# Patient Record
Sex: FEMALE | Race: WHITE | Employment: FULL TIME | URBAN - NONMETROPOLITAN AREA
[De-identification: names, ages, dates, MRNs, and addresses within clinical notes are randomized per-mention and may not be internally consistent; named-entity substitution may affect disease eponyms.]

---

## 2017-01-03 ENCOUNTER — ANESTHESIA EVENT (OUTPATIENT)
Dept: ENDOSCOPY | Age: 67
End: 2017-01-03
Payer: MEDICARE

## 2017-01-05 ENCOUNTER — ANESTHESIA (OUTPATIENT)
Dept: ENDOSCOPY | Age: 67
End: 2017-01-05
Payer: MEDICARE

## 2017-01-05 VITALS
OXYGEN SATURATION: 89 % | SYSTOLIC BLOOD PRESSURE: 114 MMHG | RESPIRATION RATE: 18 BRPM | DIASTOLIC BLOOD PRESSURE: 78 MMHG

## 2017-01-05 PROCEDURE — 2500000003 HC RX 250 WO HCPCS: Performed by: NURSE ANESTHETIST, CERTIFIED REGISTERED

## 2017-01-05 PROCEDURE — 6360000002 HC RX W HCPCS: Performed by: NURSE ANESTHETIST, CERTIFIED REGISTERED

## 2017-01-05 RX ORDER — PROPOFOL 10 MG/ML
INJECTION, EMULSION INTRAVENOUS PRN
Status: DISCONTINUED | OUTPATIENT
Start: 2017-01-05 | End: 2017-01-05 | Stop reason: SDUPTHER

## 2017-01-05 RX ORDER — LIDOCAINE HYDROCHLORIDE 10 MG/ML
INJECTION, SOLUTION EPIDURAL; INFILTRATION; INTRACAUDAL; PERINEURAL PRN
Status: DISCONTINUED | OUTPATIENT
Start: 2017-01-05 | End: 2017-01-05 | Stop reason: SDUPTHER

## 2017-01-05 RX ADMIN — PROPOFOL 250 MG: 10 INJECTION, EMULSION INTRAVENOUS at 08:37

## 2017-01-05 RX ADMIN — LIDOCAINE HYDROCHLORIDE 5 ML: 10 INJECTION, SOLUTION EPIDURAL; INFILTRATION; INTRACAUDAL; PERINEURAL at 08:37

## 2017-01-06 PROBLEM — R07.9 CHEST PAIN: Status: ACTIVE | Noted: 2017-01-06

## 2017-01-06 PROBLEM — H92.09 OTALGIA: Status: ACTIVE | Noted: 2017-01-06

## 2017-01-06 PROBLEM — R00.2 PALPITATIONS: Status: ACTIVE | Noted: 2017-01-06

## 2017-01-06 PROBLEM — E78.6 HYPOCHOLESTEREMIA: Status: ACTIVE | Noted: 2017-01-06

## 2017-01-06 PROBLEM — E03.9 HYPOTHYROIDISM: Status: ACTIVE | Noted: 2017-01-06

## 2017-01-06 PROBLEM — D50.9 MICROCYTIC ANEMIA: Status: ACTIVE | Noted: 2017-01-06

## 2017-01-10 ENCOUNTER — OFFICE VISIT (OUTPATIENT)
Dept: CARDIOLOGY | Facility: CLINIC | Age: 67
End: 2017-01-10

## 2017-01-10 VITALS
DIASTOLIC BLOOD PRESSURE: 90 MMHG | SYSTOLIC BLOOD PRESSURE: 142 MMHG | WEIGHT: 196 LBS | HEART RATE: 68 BPM | BODY MASS INDEX: 33.46 KG/M2 | HEIGHT: 64 IN

## 2017-01-10 DIAGNOSIS — E03.9 ACQUIRED HYPOTHYROIDISM: ICD-10-CM

## 2017-01-10 DIAGNOSIS — R00.2 PALPITATIONS: Primary | ICD-10-CM

## 2017-01-10 DIAGNOSIS — R07.89 ATYPICAL CHEST PAIN: ICD-10-CM

## 2017-01-10 PROCEDURE — 99204 OFFICE O/P NEW MOD 45 MIN: CPT | Performed by: INTERNAL MEDICINE

## 2017-01-10 PROCEDURE — 93000 ELECTROCARDIOGRAM COMPLETE: CPT | Performed by: INTERNAL MEDICINE

## 2017-01-10 NOTE — MR AVS SNAPSHOT
Zabrina Michelr   1/10/2017 12:30 PM   Office Visit    Dept Phone:  [phone]   Encounter #:  [id number]    Provider:  Jaime Garcia MD   Department:  [facility]                Your Full Care Plan              Your Updated Medication List          This list is accurate as of: 1/10/17  1:34 PM.  Always use your most recent med list.                dicyclomine 10 MG capsule   Commonly known as:  BENTYL       estradiol 1 MG tablet   Commonly known as:  ESTRACE       levothyroxine 100 MCG tablet   Commonly known as:  SYNTHROID, LEVOTHROID               Instructions     None    Patient Instructions History      Upcoming Appointments     Visit Type Date Time Department    NEW PATIENT 1/10/2017 12:30 PM [facility]    [facility]    [facility]IENT 2017  3:15 PM [facility] [place]      MyChart Signup     [facility] [employer] allows you to send messages to your doctor, view your test results, renew your prescriptions, schedule appointments, and more. To sign up, go to [employer] and click on the Sign Up Now link in the New User? box. Enter your [employer] Activation Code exactly as it appears below along with the last four digits of your Social Security Number and your Date of Birth () to complete the sign-up process. If you do not sign up before the expiration date, you must request a new code.    [employer] Activation Code: [email or web address]  Expires: 2017  4:35 AM    If you have questions, you can email [email or web address] or call [phone] to talk to our [employer] staff. Remember, Learn[employer] is NOT to be used for urgent needs. For medical emergencies, dial 911.               Other Info from Your Visit           Your Appointments     2017  9:30 AM CST   AUDIO with Mary Kirby CCC-JOSE   [facility] (--)    [address] [place] [address]   [address]  [facility] [address]    "  [phone]            2017  3:15 PM CST   New Patient with LG Ramirez   [facility] (--)    [address] [place] [address] 3 [facility] [address] [place] [id number] [phone]           Bring all previous medical records and films, along with current medications and insurance information.              Allergies     Meperidine  Other (See Comments)    Blood pressure drops, she is still able to use this.     Codeine  Nausea And Vomiting    Nausea, vomiting, dizziness      Reason for Visit     Results Had stress test done 1 month ago and is here for results.      Vital Signs     Blood Pressure Pulse Height Weight Body Mass Index Smoking Status    142/90 (BP Location: Left arm, Patient Position: Sitting) 68 64\" (162.6 cm) 196 lb (88.9 kg) 33.64 kg/m2 Former Smoker        "

## 2017-01-10 NOTE — LETTER
January 10, 2017     Aniket Waite MD  [address] [place] [address]    Patient: Zabrina Contreras   YOB: 1950   Date of Visit: 1/10/2017       Dear Dr. Ravindra MD:    Thank you for referring Zabrina Contreras to me for evaluation. Below are the relevant portions of my assessment and plan of care.    If you have questions, please do not hesitate to call me.          Sincerely,        Jaime Garcia MD        CC: No Recipients  Jaime Garcia MD  1/10/2017  7:51 PM  Sign at close encounter      Subjective:     Encounter Date:01/10/2017      Patient ID: Zabrina Contreras is a 66 y.o. female.with no prior cardiac history, referred here for further evaluation of recent chest discomfort and palpitations.    Referring Provider: Aniket Waite MD    Reason for Referral:  Chest discomfort and palpitations    Chief Complaint: Palpitations    Chest Pain    This is a new problem. The current episode started more than 1 month ago. The onset quality is gradual. The problem occurs intermittently. The problem has been resolved. The pain is present in the substernal region. The pain is mild. The quality of the pain is described as sharp. The pain does not radiate. Associated symptoms include palpitations. Pertinent negatives include no abdominal pain, back pain, claudication, cough, diaphoresis, dizziness, exertional chest pressure, fever, headaches, hemoptysis, irregular heartbeat, lower extremity edema, malaise/fatigue, nausea, near-syncope, numbness, orthopnea, PND, shortness of breath, syncope or vomiting. The pain is aggravated by nothing. She has tried nothing (Adjustments to thyroid medication led to resolution) for the symptoms.   Pertinent negatives for past medical history include no anxiety/panic attacks, no CAD, no drug use, no heart disease, no hyperlipidemia, no hypertension, no seizures and no valve disorder.   Pertinent negatives for family medical history include: no CAD. Prior  diagnostic workup includes stress echo.   Palpitations    This is a recurrent problem. The current episode started more than 1 month ago. The problem occurs rarely. The problem has been resolved. The symptoms are aggravated by thyroid drugs. Pertinent negatives include no chest pain (None recently), coughing, diaphoresis, dizziness, fever, irregular heartbeat, malaise/fatigue, nausea, near-syncope, numbness, shortness of breath, syncope or vomiting. Treatments tried: Adjustment of thyoird medication led to resolution of palpitaitons. The treatment provided significant relief. There is no history of anemia, anxiety, drug use, heart disease or a valve disorder.     The patient was recently seen by PCP and complained of some intermittent substernal chest discomfort, not necessarily exertional, no associated signs or symptoms - other than some palpitations, lasting variable lengths of times, non radiating.  She notes that at this same time, she was experiencing intermittent palpitations (and she has had these in the past).  No lightheadedness, dizziness, syncope, orthopnea, PND, edema.  She notes that PCP ordered stress test and made referral - during this time, she was also noted to have low TSH and thyroid replacement was adjusted downward.  Since that time, she notes that all of her symptoms have resolved - no further chest discomfort and no further palpitations.  She is here to day to discuss the results of her stress test and assure that no further cardiac testing is thought indicated.    Past Medical History   Diagnosis Date   • Chest pain 1/6/2017   • Disease of thyroid gland    • Hyperlipidemia    • Hypocholesteremia 1/6/2017   • Hypothyroidism 1/6/2017   • Microcytic anemia 1/6/2017   • Otalgia 1/6/2017   • Palpitations 1/6/2017     Past Surgical History   Procedure Laterality Date   • Hysterectomy     • Cholecystectomy     • Tonsillectomy     • Appendectomy         Current Outpatient Prescriptions:   •   dicyclomine (BENTYL) 10 MG capsule, Take 10 mg by mouth 4 (Four) Times a Day Before Meals & at Bedtime., Disp: , Rfl:   •  estradiol (ESTRACE) 1 MG tablet, Take 1 mg by mouth daily., Disp: , Rfl:   •  levothyroxine (SYNTHROID, LEVOTHROID) 100 MCG tablet, Take 100 mcg by mouth Daily., Disp: , Rfl:     Allergies   Allergen Reactions   • Meperidine Other (See Comments)     Blood pressure drops, she is still able to use this.    • Codeine Nausea And Vomiting     Nausea, vomiting, dizziness     Social History   Substance Use Topics   • Smoking status: Former Smoker   • Smokeless tobacco: Never Used      Comment: Quit 25 years ago   • Alcohol use Yes      Comment: occasional     Family History   Problem Relation Age of Onset   • COPD Mother    • No Known Problems Father      Review of Systems   Constitution: Negative for chills, diaphoresis, fever, malaise/fatigue, night sweats and weight loss.   HENT: Negative for congestion, headaches and hearing loss.    Eyes: Negative for blurred vision and pain.   Cardiovascular: Positive for palpitations. Negative for chest pain (None recently), claudication, dyspnea on exertion, irregular heartbeat, leg swelling, near-syncope, orthopnea, paroxysmal nocturnal dyspnea and syncope.   Respiratory: Negative for cough, hemoptysis, shortness of breath and wheezing.    Endocrine: Negative for cold intolerance, heat intolerance, polydipsia and polyuria.   Hematologic/Lymphatic: Negative for adenopathy and bleeding problem. Does not bruise/bleed easily.   Skin: Negative for color change, poor wound healing and rash.   Musculoskeletal: Negative for arthritis, back pain, joint pain, joint swelling, myalgias and neck pain.   Gastrointestinal: Negative for abdominal pain, change in bowel habit, constipation, diarrhea, heartburn, hematochezia, melena, nausea and vomiting.   Genitourinary: Negative for bladder incontinence, dysuria, frequency, hematuria and nocturia.   Neurological: Negative for  dizziness, focal weakness, light-headedness, loss of balance, numbness and seizures.   Psychiatric/Behavioral: Negative for altered mental status, memory loss and substance abuse.   Allergic/Immunologic: Negative for hives and persistent infections.         ECG 12 Lead  Date/Time: 1/10/2017 7:45 PM  Performed by: EITAN LONG  Authorized by: EITAN LONG   Previous ECG: no previous ECG available  Rhythm: sinus rhythm  Rate: normal  ST Segments: ST segments normal  T Waves: T waves normal  QRS axis: normal  Other findings: LAE  Clinical impression: non-specific ECG               Objective:     Physical Exam   Constitutional: She is oriented to person, place, and time. Vital signs are normal. She appears well-developed and well-nourished. She is cooperative.  Non-toxic appearance. No distress.   HENT:   Head: Normocephalic and atraumatic.   Right Ear: External ear normal.   Left Ear: External ear normal.   Nose: Nose normal.   Mouth/Throat: Uvula is midline, oropharynx is clear and moist and mucous membranes are normal. Mucous membranes are not pale, not dry and not cyanotic. No oropharyngeal exudate.   Eyes: EOM and lids are normal. Pupils are equal, round, and reactive to light.   Neck: Normal range of motion. Neck supple. No hepatojugular reflux and no JVD present. Carotid bruit is not present. No tracheal deviation and no edema present. No thyroid mass and no thyromegaly present.   Cardiovascular: Normal rate, regular rhythm, S1 normal, S2 normal, normal heart sounds, intact distal pulses and normal pulses.   No extrasystoles are present. PMI is not displaced.  Exam reveals no gallop and no friction rub.    No murmur heard.  Pulses:       Radial pulses are 2+ on the right side, and 2+ on the left side.        Femoral pulses are 2+ on the right side, and 2+ on the left side.       Dorsalis pedis pulses are 2+ on the right side, and 2+ on the left side.        Posterior tibial pulses are 2+ on  "the right side, and 2+ on the left side.   Pulmonary/Chest: Effort normal and breath sounds normal. No accessory muscle usage. No respiratory distress. She has no wheezes. She has no rales. She exhibits no tenderness.   Abdominal: Soft. Normal appearance and bowel sounds are normal. She exhibits no distension, no abdominal bruit and no pulsatile midline mass. There is no hepatosplenomegaly. There is no tenderness.   Musculoskeletal: Normal range of motion. She exhibits no edema, tenderness or deformity.   Lymphadenopathy:     She has no cervical adenopathy.   Neurological: She is oriented to person, place, and time. She has normal strength. No cranial nerve deficit.   Skin: Skin is warm, dry and intact. No rash noted. She is not diaphoretic. No cyanosis or erythema. Nails show no clubbing.   Psychiatric: She has a normal mood and affect. Her speech is normal and behavior is normal. Thought content normal.   Vitals reviewed.    Visit Vitals   • /90 (BP Location: Left arm, Patient Position: Sitting)   • Pulse 68   • Ht 64\" (162.6 cm)   • Wt 196 lb (88.9 kg)   • BMI 33.64 kg/m2       Lab Review:     Stress echo 11/8/2016  · Clinically and electrically negative treadmill portion of the stress test.  · No stress induced wall motion abnormalities.  · Normal stress echo with no significant echocardiographic evidence for myocardial ischemia.      Assessment:          Diagnosis Plan   1. Palpitations     2. Atypical chest pain     3. Acquired hypothyroidism            Plan:       1. Palpitations:  Since thyroid medication has been adjusted, she has had resolution of these symptoms.  Therefore, no further work-up seems indicated at this time.  I did inform her that if she has recurrence, while TSH and T4 were WNL, we could consider something like a Holter monitor in the future, but not indicated at present time.    2. Atypical chest pain:  Stress echo as above considered low risk.  Resolution of symptoms with treatment " of thyroid disorder.  Therefore, no further work-up indicated at present time.  Once again, as with her palpitations, I have asked her to call or return should she have recurrence of these type of symptoms.    3. Hypothyroidism:  Being followed closely by Dr. Waite.    Follow-up here as needed.

## 2017-01-11 NOTE — PROGRESS NOTES
Subjective:     Encounter Date:01/10/2017      Patient ID: Zabrina Contreras is a 66 y.o. female.with no prior cardiac history, referred here for further evaluation of recent chest discomfort and palpitations.    Referring Provider: Aniket Waite MD    Reason for Referral:  Chest discomfort and palpitations    Chief Complaint: Palpitations    Chest Pain    This is a new problem. The current episode started more than 1 month ago. The onset quality is gradual. The problem occurs intermittently. The problem has been resolved. The pain is present in the substernal region. The pain is mild. The quality of the pain is described as sharp. The pain does not radiate. Associated symptoms include palpitations. Pertinent negatives include no abdominal pain, back pain, claudication, cough, diaphoresis, dizziness, exertional chest pressure, fever, headaches, hemoptysis, irregular heartbeat, lower extremity edema, malaise/fatigue, nausea, near-syncope, numbness, orthopnea, PND, shortness of breath, syncope or vomiting. The pain is aggravated by nothing. She has tried nothing (Adjustments to thyroid medication led to resolution) for the symptoms.   Pertinent negatives for past medical history include no anxiety/panic attacks, no CAD, no drug use, no heart disease, no hyperlipidemia, no hypertension, no seizures and no valve disorder.   Pertinent negatives for family medical history include: no CAD. Prior diagnostic workup includes stress echo.   Palpitations    This is a recurrent problem. The current episode started more than 1 month ago. The problem occurs rarely. The problem has been resolved. The symptoms are aggravated by thyroid drugs. Pertinent negatives include no chest pain (None recently), coughing, diaphoresis, dizziness, fever, irregular heartbeat, malaise/fatigue, nausea, near-syncope, numbness, shortness of breath, syncope or vomiting. Treatments tried: Adjustment of thyoird medication led to resolution of  palpitaitons. The treatment provided significant relief. There is no history of anemia, anxiety, drug use, heart disease or a valve disorder.     The patient was recently seen by PCP and complained of some intermittent substernal chest discomfort, not necessarily exertional, no associated signs or symptoms - other than some palpitations, lasting variable lengths of times, non radiating.  She notes that at this same time, she was experiencing intermittent palpitations (and she has had these in the past).  No lightheadedness, dizziness, syncope, orthopnea, PND, edema.  She notes that PCP ordered stress test and made referral - during this time, she was also noted to have low TSH and thyroid replacement was adjusted downward.  Since that time, she notes that all of her symptoms have resolved - no further chest discomfort and no further palpitations.  She is here to day to discuss the results of her stress test and assure that no further cardiac testing is thought indicated.    Past Medical History   Diagnosis Date   • Chest pain 1/6/2017   • Disease of thyroid gland    • Hyperlipidemia    • Hypocholesteremia 1/6/2017   • Hypothyroidism 1/6/2017   • Microcytic anemia 1/6/2017   • Otalgia 1/6/2017   • Palpitations 1/6/2017     Past Surgical History   Procedure Laterality Date   • Hysterectomy     • Cholecystectomy     • Tonsillectomy     • Appendectomy         Current Outpatient Prescriptions:   •  dicyclomine (BENTYL) 10 MG capsule, Take 10 mg by mouth 4 (Four) Times a Day Before Meals & at Bedtime., Disp: , Rfl:   •  estradiol (ESTRACE) 1 MG tablet, Take 1 mg by mouth daily., Disp: , Rfl:   •  levothyroxine (SYNTHROID, LEVOTHROID) 100 MCG tablet, Take 100 mcg by mouth Daily., Disp: , Rfl:     Allergies   Allergen Reactions   • Meperidine Other (See Comments)     Blood pressure drops, she is still able to use this.    • Codeine Nausea And Vomiting     Nausea, vomiting, dizziness     Social History   Substance Use Topics    • Smoking status: Former Smoker   • Smokeless tobacco: Never Used      Comment: Quit 25 years ago   • Alcohol use Yes      Comment: occasional     Family History   Problem Relation Age of Onset   • COPD Mother    • No Known Problems Father      Review of Systems   Constitution: Negative for chills, diaphoresis, fever, malaise/fatigue, night sweats and weight loss.   HENT: Negative for congestion, headaches and hearing loss.    Eyes: Negative for blurred vision and pain.   Cardiovascular: Positive for palpitations. Negative for chest pain (None recently), claudication, dyspnea on exertion, irregular heartbeat, leg swelling, near-syncope, orthopnea, paroxysmal nocturnal dyspnea and syncope.   Respiratory: Negative for cough, hemoptysis, shortness of breath and wheezing.    Endocrine: Negative for cold intolerance, heat intolerance, polydipsia and polyuria.   Hematologic/Lymphatic: Negative for adenopathy and bleeding problem. Does not bruise/bleed easily.   Skin: Negative for color change, poor wound healing and rash.   Musculoskeletal: Negative for arthritis, back pain, joint pain, joint swelling, myalgias and neck pain.   Gastrointestinal: Negative for abdominal pain, change in bowel habit, constipation, diarrhea, heartburn, hematochezia, melena, nausea and vomiting.   Genitourinary: Negative for bladder incontinence, dysuria, frequency, hematuria and nocturia.   Neurological: Negative for dizziness, focal weakness, light-headedness, loss of balance, numbness and seizures.   Psychiatric/Behavioral: Negative for altered mental status, memory loss and substance abuse.   Allergic/Immunologic: Negative for hives and persistent infections.         ECG 12 Lead  Date/Time: 1/10/2017 7:45 PM  Performed by: EITAN LONG  Authorized by: EITAN LONG   Previous ECG: no previous ECG available  Rhythm: sinus rhythm  Rate: normal  ST Segments: ST segments normal  T Waves: T waves normal  QRS axis:  normal  Other findings: LAE  Clinical impression: non-specific ECG               Objective:     Physical Exam   Constitutional: She is oriented to person, place, and time. Vital signs are normal. She appears well-developed and well-nourished. She is cooperative.  Non-toxic appearance. No distress.   HENT:   Head: Normocephalic and atraumatic.   Right Ear: External ear normal.   Left Ear: External ear normal.   Nose: Nose normal.   Mouth/Throat: Uvula is midline, oropharynx is clear and moist and mucous membranes are normal. Mucous membranes are not pale, not dry and not cyanotic. No oropharyngeal exudate.   Eyes: EOM and lids are normal. Pupils are equal, round, and reactive to light.   Neck: Normal range of motion. Neck supple. No hepatojugular reflux and no JVD present. Carotid bruit is not present. No tracheal deviation and no edema present. No thyroid mass and no thyromegaly present.   Cardiovascular: Normal rate, regular rhythm, S1 normal, S2 normal, normal heart sounds, intact distal pulses and normal pulses.   No extrasystoles are present. PMI is not displaced.  Exam reveals no gallop and no friction rub.    No murmur heard.  Pulses:       Radial pulses are 2+ on the right side, and 2+ on the left side.        Femoral pulses are 2+ on the right side, and 2+ on the left side.       Dorsalis pedis pulses are 2+ on the right side, and 2+ on the left side.        Posterior tibial pulses are 2+ on the right side, and 2+ on the left side.   Pulmonary/Chest: Effort normal and breath sounds normal. No accessory muscle usage. No respiratory distress. She has no wheezes. She has no rales. She exhibits no tenderness.   Abdominal: Soft. Normal appearance and bowel sounds are normal. She exhibits no distension, no abdominal bruit and no pulsatile midline mass. There is no hepatosplenomegaly. There is no tenderness.   Musculoskeletal: Normal range of motion. She exhibits no edema, tenderness or deformity.  "  Lymphadenopathy:     She has no cervical adenopathy.   Neurological: She is oriented to person, place, and time. She has normal strength. No cranial nerve deficit.   Skin: Skin is warm, dry and intact. No rash noted. She is not diaphoretic. No cyanosis or erythema. Nails show no clubbing.   Psychiatric: She has a normal mood and affect. Her speech is normal and behavior is normal. Thought content normal.   Vitals reviewed.    Visit Vitals   • /90 (BP Location: Left arm, Patient Position: Sitting)   • Pulse 68   • Ht 64\" (162.6 cm)   • Wt 196 lb (88.9 kg)   • BMI 33.64 kg/m2       Lab Review:     Stress echo 11/8/2016  · Clinically and electrically negative treadmill portion of the stress test.  · No stress induced wall motion abnormalities.  · Normal stress echo with no significant echocardiographic evidence for myocardial ischemia.      Assessment:          Diagnosis Plan   1. Palpitations     2. Atypical chest pain     3. Acquired hypothyroidism            Plan:       1. Palpitations:  Since thyroid medication has been adjusted, she has had resolution of these symptoms.  Therefore, no further work-up seems indicated at this time.  I did inform her that if she has recurrence, while TSH and T4 were WNL, we could consider something like a Holter monitor in the future, but not indicated at present time.    2. Atypical chest pain:  Stress echo as above considered low risk.  Resolution of symptoms with treatment of thyroid disorder.  Therefore, no further work-up indicated at present time.  Once again, as with her palpitations, I have asked her to call or return should she have recurrence of these type of symptoms.    3. Hypothyroidism:  Being followed closely by Dr. Waite.    Follow-up here as needed.         "

## 2017-01-12 ENCOUNTER — OFFICE VISIT (OUTPATIENT)
Dept: OTOLARYNGOLOGY | Facility: CLINIC | Age: 67
End: 2017-01-12

## 2017-01-12 ENCOUNTER — PROCEDURE VISIT (OUTPATIENT)
Dept: OTOLARYNGOLOGY | Facility: CLINIC | Age: 67
End: 2017-01-12

## 2017-01-12 VITALS
DIASTOLIC BLOOD PRESSURE: 83 MMHG | HEIGHT: 64 IN | WEIGHT: 193 LBS | BODY MASS INDEX: 32.95 KG/M2 | TEMPERATURE: 97.8 F | HEART RATE: 64 BPM | SYSTOLIC BLOOD PRESSURE: 160 MMHG | RESPIRATION RATE: 16 BRPM

## 2017-01-12 DIAGNOSIS — R42 DIZZINESS: ICD-10-CM

## 2017-01-12 DIAGNOSIS — R42 DIZZINESS: Primary | ICD-10-CM

## 2017-01-12 DIAGNOSIS — H92.01 OTALGIA OF RIGHT EAR: Primary | ICD-10-CM

## 2017-01-12 DIAGNOSIS — H92.01 OTALGIA OF RIGHT EAR: ICD-10-CM

## 2017-01-12 PROCEDURE — 92553 AUDIOMETRY AIR & BONE: CPT | Performed by: AUDIOLOGIST

## 2017-01-12 PROCEDURE — 92550 TYMPANOMETRY & REFLEX THRESH: CPT | Performed by: AUDIOLOGIST

## 2017-01-12 PROCEDURE — 99203 OFFICE O/P NEW LOW 30 MIN: CPT | Performed by: NURSE PRACTITIONER

## 2017-01-12 NOTE — MR AVS SNAPSHOT
Zabrina Michelr   2017 9:30 AM   Procedure visit    Dept Phone:  [phone]   Encounter #:  [id number]    Provider:  HARLAN Allison   Department:  [facility]                Your Full Care Plan              Your Updated Medication List          This list is accurate as of: 17 12:29 PM.  Always use your most recent med list.                dicyclomine 10 MG capsule   Commonly known as:  BENTYL       estradiol 1 MG tablet   Commonly known as:  ESTRACE       levothyroxine 100 MCG tablet   Commonly known as:  SYNTHROID, LEVOTHROID               You Were Diagnosed With        Codes Comments    Dizziness    -  Primary ICD-10-CM: R42  ICD-9-CM: 780.4     Otalgia of right ear     ICD-10-CM: H92.01  ICD-9-CM: 388.70       Instructions     None    Patient Instructions History      Upcoming Appointments     Visit Type Date Time Department    [facility]    [facility]IENT 2017  3:15 PM [facility] [place]      [employer] Signup     [facility] [employer] allows you to send messages to your doctor, view your test results, renew your prescriptions, schedule appointments, and more. To sign up, go to [employer] and click on the Sign Up Now link in the New User? box. Enter your [employer] Activation Code exactly as it appears below along with the last four digits of your Social Security Number and your Date of Birth () to complete the sign-up process. If you do not sign up before the expiration date, you must request a new code.    [employer] Activation Code: [email or web address]  Expires: 2017 12:29 PM    If you have questions, you can email [email or web address] or call [phone] to talk to our [employer] staff. Remember, [employer] is NOT to be used for urgent needs. For medical emergencies, dial 911.               Other Info from Your Visit           Your Appointments     2017  3:15 PM CST   New Patient with Krystle Johnson,  LG   [facility] (--)    [address]   [address]  [facility] [address] [phone]           Bring all previous medical records and films, along with current medications and insurance information.              Allergies     Meperidine  Other (See Comments)    Blood pressure drops, she is still able to use this.     Codeine  Nausea And Vomiting    Nausea, vomiting, dizziness      Reason for Visit     Earache     Dizziness           Vital Signs     Smoking Status                   Former Smoker           Problems and Diagnoses Noted     Dizziness    -  Primary    Ear pain

## 2017-01-12 NOTE — MR AVS SNAPSHOT
Zabrina Contreras   2017 3:15 PM   Office Visit    Dept Phone:  [phone]   Encounter #:  [id number]    Provider:  LG Ramirez   Department:  [facility]                Your Full Care Plan              Your Updated Medication List          This list is accurate as of: 17  4:25 PM.  Always use your most recent med list.                dicyclomine 10 MG capsule   Commonly known as:  BENTYL       estradiol 1 MG tablet   Commonly known as:  ESTRACE       levothyroxine 100 MCG tablet   Commonly known as:  SYNTHROID, LEVOTHROID               Instructions     None    Patient Instructions History      Upcoming Appointments     Visit Type Date Time Department    AUDIO 2017  9:30 AM [facility]    NEW PATIENT 2017  3:15 PM [facility] [place]      [employer] Signup     [facility] [employer] allows you to send messages to your doctor, view your test results, renew your prescriptions, schedule appointments, and more. To sign up, go to [facility] and click on the Sign Up Now link in the New User? box. Enter your [employer] Activation Code exactly as it appears below along with the last four digits of your Social Security Number and your Date of Birth () to complete the sign-up process. If you do not sign up before the expiration date, you must request a new code.    [employer] Activation Code: [email or web address]  Expires: 2017 12:29 PM    If you have questions, you can email [email or web address] or call [phone] to talk to our [employer] staff. Remember, [employer] is NOT to be used for urgent needs. For medical emergencies, dial 911.               Other Info from Your Visit           Allergies     Meperidine  Other (See Comments)    Blood pressure drops, she is still able to use this.     Codeine  Nausea And Vomiting    Nausea, vomiting, dizziness      Reason for Visit     Ear Problem vertigo and right ear pain      Vital Signs     Blood Pressure  "Pulse Temperature Respirations Height Weight    160/83 64 97.8 °F (36.6 °C) 16 64\" (162.6 cm) 193 lb (87.5 kg)    Body Mass Index Smoking Status                33.13 kg/m2 Former Smoker            "

## 2017-01-12 NOTE — PROGRESS NOTES
CASE HISTORY DETAILS   Ms. Contreras presented to the clinic this date with complaints of dizziness and ear pain. She reported the dizziness began approximately one month ago and was worse with head movements. She has had a positive [place] Hallpike and Epley maneuver performed by her chiropractor. She reported a recent change in thyroid medication. She has bilateral tinnitus but denied changes with onset of dizziness. She denied aural fullness and decreased hearing with onset of dizziness. Ear pain began more recently and is not associated with dizziness. She has history of ear infection and noted the ear pain is typically associated with onset of ear infection.     SUMMARY   RIGHT  · Otoscopy revealed clear EAC/Unremarkable TM.  · Mild high frequency sensorineural hearing loss.  · Immitance measures are consistent with normal middle ear function.    LEFT  · Otoscopy revealed clear EAC/Unremarkable TM.  · Hearing WNL..  · Immitance measures reduced TM compliance.     Acoustic reflex measures were essentially normal. Some responses were slightly elevated, however, this is likely due to reduced TM compliance in the left ear.    A Misha Hallpike was performed and was negative bilaterally.    RECOMMENDATIONS   Results of today's evaluation were discussed with Ms. Contreras and the following recommendations were made:  1. ENT evaluation today as scheduled.      AUDIOGRAM AND IMMITANCE             Zuly Alcantar, CCC-A, FAAA  Audiologist

## 2017-01-13 NOTE — PROGRESS NOTES
YOB: 1950  Location: [facility]  Location Address: [address] [place] [address], [facility], [address] [place] [address]  Location Phone: [phone]    Chief Complaint   Patient presents with   • Ear Problem     vertigo and right ear pain       History of Present Illness  Zabrina Contreras is a 66 y.o. female.  Zabrina Contreras is here for evaluation of ENT complaints. The patient has had problems with otalgia and dizziness  The symptoms are not localized to a particular location. The patient has had improving symptoms. The symptoms have been present for the last several weeks . The symptoms are aggravated by  no identifiable factors . The symptoms are improved by no identifieable factors.  She reports that her dizziness is improving.      Procedure visit     2017  [facility]    Mary Kirby CCC-A   Audiology    Dizziness +1 more   Dx    Earache, Dizziness   Reason for visit    Progress Notes         CASE HISTORY DETAILS   Ms. Contreras presented to the clinic this date with complaints of dizziness and ear pain. She reported the dizziness began approximately one month ago and was worse with head movements. She has had a positive [place] Hallpike and Epley maneuver performed by her chiropractor. She reported a recent change in thyroid medication. She has bilateral tinnitus but denied changes with onset of dizziness. She denied aural fullness and decreased hearing with onset of dizziness. Ear pain began more recently and is not associated with dizziness. She has history of ear infection and noted the ear pain is typically associated with onset of ear infection.      SUMMARY   RIGHT  ¨ Otoscopy revealed clear EAC/Unremarkable TM.  ¨ Mild high frequency sensorineural hearing loss.  ¨ Immitance measures are consistent with normal middle ear function.     LEFT  ¨ Otoscopy revealed clear EAC/Unremarkable TM.  ¨ Hearing WNL..  ¨ Immitance measures reduced TM compliance.     Acoustic reflex measures were essentially  normal. Some responses were slightly elevated, however, this is likely due to reduced TM compliance in the left ear.     A [place] Hallpike was performed and was negative bilaterally.     RECOMMENDATIONS   Results of today's evaluation were discussed with Ms. Contreras and the following recommendations were made:  1. ENT evaluation today as scheduled.        AUDIOGRAM AND IMMITANCE                Past Medical History   Diagnosis Date   • Chest pain 1/6/2017   • Disease of thyroid gland    • Hyperlipidemia    • Hypocholesteremia 1/6/2017   • Hypothyroidism 1/6/2017   • Microcytic anemia 1/6/2017   • Otalgia 1/6/2017   • Palpitations 1/6/2017       Past Surgical History   Procedure Laterality Date   • Hysterectomy     • Cholecystectomy     • Tonsillectomy     • Appendectomy           Current Outpatient Prescriptions:   •  dicyclomine (BENTYL) 10 MG capsule, Take 10 mg by mouth 4 (Four) Times a Day Before Meals & at Bedtime., Disp: , Rfl:   •  estradiol (ESTRACE) 1 MG tablet, Take 1 mg by mouth daily., Disp: , Rfl:   •  levothyroxine (SYNTHROID, LEVOTHROID) 100 MCG tablet, Take 100 mcg by mouth Daily., Disp: , Rfl:     Meperidine and Codeine    Family History   Problem Relation Age of Onset   • COPD Mother    • No Known Problems Father        Social History     Social History   • Marital status:      Spouse name: N/A   • Number of children: N/A   • Years of education: N/A     Occupational History   • Not on file.     Social History Main Topics   • Smoking status: Former Smoker   • Smokeless tobacco: Never Used      Comment: Quit 25 years ago   • Alcohol use Yes      Comment: occasional   • Drug use: No   • Sexual activity: Defer     Other Topics Concern   • Not on file     Social History Narrative       Review of Systems   Constitutional: Negative.    HENT:        SEE HPI   Eyes: Negative.    Respiratory: Negative.    Cardiovascular: Negative.    Gastrointestinal: Negative.    Endocrine: Negative.    Genitourinary:  Negative.    Musculoskeletal: Negative.    Skin: Negative.    Allergic/Immunologic: Negative.    Neurological: Negative.    Hematological: Negative.    Psychiatric/Behavioral: Negative.        Vitals:    01/12/17 1104   BP: 160/83   Pulse: 64   Resp: 16   Temp: 97.8 °F (36.6 °C)       Objective     Physical Exam  CONSTITUTIONAL: well nourished, alert, oriented, in no acute distress     COMMUNICATION AND VOICE: able to communicate normally, normal voice quality    HEAD: normocephalic, no lesions, atraumatic, no tenderness, no masses     FACE: appearance normal, no lesions, no tenderness, no deformities, facial motion symmetric    SALIVARY GLANDS: parotid glands with no tenderness, no swelling, no masses, submandibular glands with normal size, nontender    EYES: ocular motility normal, eyelids normal, orbits normal, no proptosis, conjunctiva normal , pupils equal, round     EARS:  Hearing: response to conversational voice normal bilaterally   External Ears: auricles without lesions  Otoscopic: tympanic membrane appearance normal, no lesions, no perforation, normal mobility, no fluid    NOSE:  External Nose: structure normal, no tenderness on palpation, no nasal discharge, no lesions, no evidence of trauma, nostrils patent   Intranasal Exam: nasal mucosa normal, vestibule within normal limits, inferior turbinate normal, nasal septum midline     ORAL:  Lips: upper and lower lips without lesion   Teeth: dentition within normal limits for age   Gums: gingivae healthy   Oral Mucosa: oral mucosa normal, no mucosal lesions   Floor of Mouth: Warthin’s duct patent, mucosa normal  Tongue: lingual mucosa normal without lesions, normal tongue mobility   Palate: soft and hard palates with normal mucosa and structure  Oropharynx: oropharyngeal mucosa normal    NECK: neck appearance normal, no mass,  noted without erythema or tenderness    THYROID: no overt thyromegaly, no tenderness, nodules or mass present on palpation, position  midline     LYMPH NODES: no lymphadenopathy    CHEST/RESPIRATORY: respiratory effort normal, normal breath sounds     CARDIOVASCULAR: rate and rhythm normal, extremities without cyanosis or edema      NEUROLOGIC/PSYCHIATRIC: oriented to time, place and person, mood normal, affect appropriate, CN II-XII intact grossly    Assessment/Plan   Zabrina was seen today for ear problem.    Diagnoses and all orders for this visit:    Otalgia of right ear    Dizziness      * Surgery not found *  No orders of the defined types were placed in this encounter.    Return if symptoms worsen or fail to improve.       Patient Instructions   Ears are clear today, and dizziness and otalgia are resolving. Call for problems or worsening symptoms or if symptoms return.

## 2017-01-13 NOTE — PATIENT INSTRUCTIONS
Ears are clear today, and dizziness and otalgia are resolving. Call for problems or worsening symptoms or if symptoms return.

## 2020-01-17 NOTE — PROGRESS NOTES
Subjective   Zabrina Contreras is a 69 y.o. female.   Chief Complaint   Patient presents with   • Annual Exam       Patient presents to the clinic today for yearly follow up and review of labs. Patient denied any recent hospitalizations, illnesses, or injuries.        The following portions of the patient's history were reviewed and updated as appropriate: allergies, current medications, past family history, past medical history, past social history, past surgical history and problem list.    Review of Systems   Constitutional: Negative for activity change, appetite change, fatigue, fever, unexpected weight gain and unexpected weight loss.   HENT: Positive for rhinorrhea. Negative for swollen glands, trouble swallowing and voice change.    Eyes: Negative for blurred vision and visual disturbance.   Respiratory: Negative for cough and shortness of breath.    Cardiovascular: Negative for chest pain, palpitations and leg swelling.   Gastrointestinal: Negative for abdominal pain, constipation, diarrhea, nausea, vomiting and indigestion.   Endocrine: Negative for cold intolerance, heat intolerance, polydipsia and polyphagia.   Genitourinary: Negative for dysuria, frequency and hematuria.   Musculoskeletal: Negative for arthralgias, back pain, joint swelling and neck pain.   Skin: Negative for color change, rash and skin lesions.   Neurological: Negative for dizziness, weakness, headache, memory problem and confusion.   Hematological: Does not bruise/bleed easily.   Psychiatric/Behavioral: Negative for agitation, hallucinations and suicidal ideas. The patient is not nervous/anxious.        Objective   Vitals:    01/21/20 0813   BP: 118/74   Pulse: 72     Physical Exam   Constitutional: She is oriented to person, place, and time. She appears well-developed and well-nourished.   HENT:   Head: Normocephalic and atraumatic.   Right Ear: External ear normal.   Left Ear: External ear normal.   Nose: Nose normal.   Mouth/Throat:  Oropharynx is clear and moist.   Eyes: Pupils are equal, round, and reactive to light. Conjunctivae and EOM are normal.   Neck: Normal range of motion. Neck supple. No thyromegaly present.   Cardiovascular: Normal rate, regular rhythm, normal heart sounds and intact distal pulses.   Pulmonary/Chest: Effort normal and breath sounds normal.   Abdominal: Soft. Bowel sounds are normal.   Lymphadenopathy:     She has no cervical adenopathy.   Neurological: She is alert and oriented to person, place, and time.   Skin: Skin is warm and dry. Capillary refill takes less than 2 seconds. Turgor is normal. No abrasion, no bruising and no rash noted.   Psychiatric: She has a normal mood and affect. Her behavior is normal. Thought content normal. She does not exhibit a depressed mood.   Nursing note and vitals reviewed.      Patient's Body mass index is 33.47 kg/m². BMI is above normal parameters. Recommendations include: educational material, exercise counseling and nutrition counseling.      Assessment/Plan   There are no diagnoses linked to this encounter.

## 2020-01-21 ENCOUNTER — OFFICE VISIT (OUTPATIENT)
Dept: INTERNAL MEDICINE | Facility: CLINIC | Age: 70
End: 2020-01-21

## 2020-01-21 VITALS
WEIGHT: 195 LBS | HEART RATE: 72 BPM | SYSTOLIC BLOOD PRESSURE: 118 MMHG | BODY MASS INDEX: 33.29 KG/M2 | HEIGHT: 64 IN | DIASTOLIC BLOOD PRESSURE: 74 MMHG

## 2020-01-21 DIAGNOSIS — K58.9 IRRITABLE BOWEL SYNDROME WITHOUT DIARRHEA: ICD-10-CM

## 2020-01-21 DIAGNOSIS — Z78.0 POST-MENOPAUSE: ICD-10-CM

## 2020-01-21 DIAGNOSIS — Z00.00 MEDICARE ANNUAL WELLNESS VISIT, SUBSEQUENT: Primary | ICD-10-CM

## 2020-01-21 DIAGNOSIS — E03.9 HYPOTHYROIDISM, UNSPECIFIED TYPE: ICD-10-CM

## 2020-01-21 PROBLEM — E78.00 HYPERCHOLESTEROLEMIA: Status: ACTIVE | Noted: 2017-01-06

## 2020-01-21 PROCEDURE — 96160 PT-FOCUSED HLTH RISK ASSMT: CPT | Performed by: NURSE PRACTITIONER

## 2020-01-21 PROCEDURE — G0439 PPPS, SUBSEQ VISIT: HCPCS | Performed by: NURSE PRACTITIONER

## 2020-01-21 RX ORDER — ESTRADIOL 1 MG/1
1 TABLET ORAL
COMMUNITY
End: 2020-03-16 | Stop reason: SDUPTHER

## 2020-01-21 NOTE — ASSESSMENT & PLAN NOTE
Lipid abnormalities are improving with lifestyle modifications.  Nutritional counseling was provided. and Lipid-lowering therapy was not prescribed due to patient lipid panel improved from previous visit with lifestyle changes.  Lipids will be reassessed in 1 year.

## 2020-01-21 NOTE — PROGRESS NOTES
The ABCs of the Annual Wellness Visit  Subsequent Medicare Wellness Visit    Chief Complaint   Patient presents with   • Annual Exam       Subjective   History of Present Illness:  Zabrina Contreras is a 69 y.o. female who presents for a Subsequent Medicare Wellness Visit.    HEALTH RISK ASSESSMENT    Recent Hospitalizations:  No hospitalization(s) within the last year.    Current Medical Providers:  Patient Care Team:  Aniket Waite MD as PCP - General  Aniket Waite MD as PCP - Family Medicine  Aniket Waite MD as Referring Physician (Internal Medicine)  Jaime Garcia MD as Cardiologist (Cardiology)    Smoking Status:  Social History     Tobacco Use   Smoking Status Former Smoker   Smokeless Tobacco Never Used   Tobacco Comment    Quit 25 years ago       Alcohol Consumption:  Social History     Substance and Sexual Activity   Alcohol Use Yes    Comment: occasional       Depression Screen:   PHQ-2/PHQ-9 Depression Screening 1/21/2020   Little interest or pleasure in doing things 1   Feeling down, depressed, or hopeless 0   Total Score 1       Fall Risk Screen:  STEADI Fall Risk Assessment was completed, and patient is at LOW risk for falls.Assessment completed on:1/21/2020    Health Habits and Functional and Cognitive Screening:  No flowsheet data found.      Does the patient have evidence of cognitive impairment? No    Asprin use counseling:Does not need ASA (and currently is not on it)    Age-appropriate Screening Schedule:  Refer to the list below for future screening recommendations based on patient's age, sex and/or medical conditions. Orders for these recommended tests are listed in the plan section. The patient has been provided with a written plan.    Health Maintenance   Topic Date Due   • COLONOSCOPY  01/21/2021 (Originally 1/17/2020)   • TDAP/TD VACCINES (1 - Tdap) 01/21/2021 (Originally 8/30/1961)   • ZOSTER VACCINE (1 of 2) 01/21/2021 (Originally 8/30/1998)   • MAMMOGRAM   11/29/2020   • LIPID PANEL  01/21/2021   • INFLUENZA VACCINE  Completed          The following portions of the patient's history were reviewed and updated as appropriate: allergies, current medications, past family history, past medical history, past social history, past surgical history and problem list.    Outpatient Medications Prior to Visit   Medication Sig Dispense Refill   • dicyclomine (BENTYL) 10 MG capsule Take 10 mg by mouth 4 (Four) Times a Day Before Meals & at Bedtime.     • estradiol (ESTRACE) 1 MG tablet Take 1 mg by mouth.     • levothyroxine (SYNTHROID) 100 MCG tablet Take  by mouth Daily.     • estradiol (ESTRACE) 1 MG tablet Take 1 mg by mouth daily.     • levothyroxine (SYNTHROID, LEVOTHROID) 100 MCG tablet Take 100 mcg by mouth Daily.       No facility-administered medications prior to visit.        Patient Active Problem List   Diagnosis   • Irritable bowel syndrome without diarrhea   • History of colonic polyps   • Hypothyroidism   • Hypercholesterolemia   • Microcytic anemia   • Otalgia   • Palpitations   • Atypical chest pain   • Post-menopause       Advanced Care Planning:  Did not discuss today    Review of Systems   Constitutional: Negative for activity change and fatigue.   HENT: Negative for congestion, postnasal drip, sinus pressure, sneezing, sore throat, tinnitus and trouble swallowing.    Eyes: Negative.    Respiratory: Negative for cough, shortness of breath, wheezing and stridor.    Cardiovascular: Negative for chest pain, palpitations and leg swelling.   Gastrointestinal: Negative for abdominal distention, abdominal pain, constipation, diarrhea and nausea.   Endocrine: Negative for cold intolerance, heat intolerance, polydipsia, polyphagia and polyuria.   Genitourinary: Negative for decreased urine volume, difficulty urinating, dysuria, flank pain, frequency and urgency.   Musculoskeletal: Negative for back pain, neck pain and neck stiffness.   Skin: Negative for rash and wound.  "  Neurological: Negative for dizziness, tremors, weakness, light-headedness and headaches.   Hematological: Negative for adenopathy. Does not bruise/bleed easily.   Psychiatric/Behavioral: Negative for confusion and sleep disturbance. The patient is not nervous/anxious.        Compared to one year ago, the patient feels her physical health is better.  Compared to one year ago, the patient feels her mental health is better.    Reviewed chart for potential of high risk medication in the elderly: yes  Reviewed chart for potential of harmful drug interactions in the elderly:yes    Objective         Vitals:    01/21/20 0813   BP: 118/74   Pulse: 72   Weight: 88.5 kg (195 lb)   Height: 162.6 cm (64\")       Body mass index is 33.47 kg/m².  Discussed the patient's BMI with her. The BMI is above average; BMI management plan is completed.    Physical Exam   Constitutional: She is oriented to person, place, and time. She appears well-developed and well-nourished.   HENT:   Head: Normocephalic and atraumatic.   Mouth/Throat: Oropharynx is clear and moist.   Eyes: Pupils are equal, round, and reactive to light. EOM are normal.   Neck: Normal range of motion. Neck supple.   Cardiovascular: Normal rate and regular rhythm.   Pulmonary/Chest: Effort normal and breath sounds normal.   Abdominal: Soft. Bowel sounds are normal.   Musculoskeletal: Normal range of motion. She exhibits no edema.   Neurological: She is alert and oriented to person, place, and time.   Skin: Skin is warm and dry.   Psychiatric: She has a normal mood and affect. Her speech is normal and behavior is normal.             Assessment/Plan   Medicare Risks and Personalized Health Plan  CMS Preventative Services Quick Reference  Immunizations Discussed/Encouraged (specific immunizations; adacel Tdap and Pneumococcal 23 )  Obesity/Overweight      Patient refused immunization at this time.    The above risks/problems have been discussed with the patient.  Pertinent " information has been shared with the patient in the After Visit Summary.  Follow up plans and orders are seen below in the Assessment/Plan Section.    Diagnoses and all orders for this visit:    1. Medicare annual wellness visit, subsequent (Primary)      Follow Up:  Return in about 1 year (around 1/21/2021) for Annual physical.     An After Visit Summary and PPPS were given to the patient.

## 2020-03-16 RX ORDER — LEVOTHYROXINE SODIUM 0.1 MG/1
100 TABLET ORAL DAILY
Qty: 90 TABLET | Refills: 3 | Status: SHIPPED | OUTPATIENT
Start: 2020-03-16 | End: 2020-07-29 | Stop reason: SDUPTHER

## 2020-03-16 RX ORDER — ESTRADIOL 1 MG/1
1 TABLET ORAL DAILY
Qty: 90 TABLET | Refills: 3 | Status: SHIPPED | OUTPATIENT
Start: 2020-03-16 | End: 2021-02-22 | Stop reason: SDUPTHER

## 2020-03-16 RX ORDER — DICYCLOMINE HYDROCHLORIDE 10 MG/1
10 CAPSULE ORAL 3 TIMES DAILY
Qty: 270 CAPSULE | Refills: 3 | Status: SHIPPED | OUTPATIENT
Start: 2020-03-16

## 2020-03-16 NOTE — TELEPHONE ENCOUNTER
Patient called in to request RX refill of  dicyclomine (BENTYL) 10 MG capsule    estradiol (ESTRACE) 1 MG tablet    levothyroxine (SYNTHROID) 100 MCG tablet    Patient is out of medication but still wants to go through the mail order.     Pharmacy confirmed      Please Advise

## 2020-05-22 ENCOUNTER — TELEPHONE (OUTPATIENT)
Dept: GASTROENTEROLOGY | Age: 70
End: 2020-05-22

## 2020-05-22 ENCOUNTER — OFFICE VISIT (OUTPATIENT)
Dept: GASTROENTEROLOGY | Age: 70
End: 2020-05-22
Payer: MEDICARE

## 2020-05-22 VITALS
HEART RATE: 62 BPM | DIASTOLIC BLOOD PRESSURE: 80 MMHG | HEIGHT: 64 IN | SYSTOLIC BLOOD PRESSURE: 112 MMHG | OXYGEN SATURATION: 98 % | BODY MASS INDEX: 33.7 KG/M2 | WEIGHT: 197.4 LBS

## 2020-05-22 DIAGNOSIS — R10.10 UPPER ABDOMINAL PAIN: ICD-10-CM

## 2020-05-22 PROBLEM — R19.7 DIARRHEA: Status: ACTIVE | Noted: 2020-05-22

## 2020-05-22 PROBLEM — R14.0 BLOATING: Status: ACTIVE | Noted: 2020-05-22

## 2020-05-22 PROBLEM — Z86.010 PERSONAL HISTORY OF COLONIC POLYPS: Status: ACTIVE | Noted: 2020-05-22

## 2020-05-22 PROBLEM — Z90.49 S/P CHOLECYSTECTOMY: Status: ACTIVE | Noted: 2020-05-22

## 2020-05-22 PROBLEM — R68.81 EARLY SATIETY: Status: ACTIVE | Noted: 2020-05-22

## 2020-05-22 PROBLEM — R10.32 BILATERAL LOWER ABDOMINAL CRAMPING: Status: ACTIVE | Noted: 2020-05-22

## 2020-05-22 PROBLEM — R10.31 BILATERAL LOWER ABDOMINAL CRAMPING: Status: ACTIVE | Noted: 2020-05-22

## 2020-05-22 PROBLEM — K62.5 BRBPR (BRIGHT RED BLOOD PER RECTUM): Status: ACTIVE | Noted: 2020-05-22

## 2020-05-22 LAB — LIPASE: 20 U/L (ref 13–60)

## 2020-05-22 PROCEDURE — 3017F COLORECTAL CA SCREEN DOC REV: CPT | Performed by: NURSE PRACTITIONER

## 2020-05-22 PROCEDURE — 1036F TOBACCO NON-USER: CPT | Performed by: NURSE PRACTITIONER

## 2020-05-22 PROCEDURE — 1090F PRES/ABSN URINE INCON ASSESS: CPT | Performed by: NURSE PRACTITIONER

## 2020-05-22 PROCEDURE — G8400 PT W/DXA NO RESULTS DOC: HCPCS | Performed by: NURSE PRACTITIONER

## 2020-05-22 PROCEDURE — G8427 DOCREV CUR MEDS BY ELIG CLIN: HCPCS | Performed by: NURSE PRACTITIONER

## 2020-05-22 PROCEDURE — 99204 OFFICE O/P NEW MOD 45 MIN: CPT | Performed by: NURSE PRACTITIONER

## 2020-05-22 PROCEDURE — G8417 CALC BMI ABV UP PARAM F/U: HCPCS | Performed by: NURSE PRACTITIONER

## 2020-05-22 PROCEDURE — 1123F ACP DISCUSS/DSCN MKR DOCD: CPT | Performed by: NURSE PRACTITIONER

## 2020-05-22 PROCEDURE — 4040F PNEUMOC VAC/ADMIN/RCVD: CPT | Performed by: NURSE PRACTITIONER

## 2020-05-22 ASSESSMENT — ENCOUNTER SYMPTOMS
COUGH: 0
ABDOMINAL PAIN: 1
CONSTIPATION: 0
VOICE CHANGE: 0
ABDOMINAL DISTENTION: 0
DIARRHEA: 1
ANAL BLEEDING: 1
SORE THROAT: 0
RECTAL PAIN: 0
TROUBLE SWALLOWING: 0
BLOOD IN STOOL: 0
BACK PAIN: 0
PHOTOPHOBIA: 0
NAUSEA: 0
VOMITING: 0
SHORTNESS OF BREATH: 0

## 2020-05-22 NOTE — TELEPHONE ENCOUNTER
Please let Ame Galdamez know that her lipase level is normal, there is no evidence of pancreatitis based on labwork.  thanks

## 2020-05-27 ENCOUNTER — PATIENT MESSAGE (OUTPATIENT)
Dept: GASTROENTEROLOGY | Age: 70
End: 2020-05-27

## 2020-05-27 NOTE — TELEPHONE ENCOUNTER
From: Alicia Barrios  To: LASHAWN Dillard  Sent: 5/27/2020 12:48 PM CDT  Subject: Visit Follow-Up Question    No one has called to schedule my procedures. I just want to make sure it was not overlooked.

## 2020-06-04 ENCOUNTER — TELEPHONE (OUTPATIENT)
Dept: GASTROENTEROLOGY | Age: 70
End: 2020-06-04

## 2020-06-04 NOTE — TELEPHONE ENCOUNTER
6-4-20    Patient is scheduled at [facility] with Patricia Noonan on 6-10-20. I called the patient and she is aware she will need COVID testing 4 days exactly prior to her scope, she will be going to the [place] location on 6-7-20, hours 11:00 to 4:00 pm and to then quarantine at home till her scopes are complete.  Stanley duron

## 2020-06-07 ENCOUNTER — OFFICE VISIT (OUTPATIENT)
Age: 70
End: 2020-06-07

## 2020-06-07 VITALS — TEMPERATURE: 97.3 F | OXYGEN SATURATION: 94 %

## 2020-06-07 NOTE — PATIENT INSTRUCTIONS
petting, snuggling, being kissed or licked, and sharing food. If you must care for your pet or be around animals while you are sick, wash your hands before and after you interact with pets and wear a facemask. Call ahead before visiting your doctor  If you have a medical appointment, call the healthcare provider and tell them that you have or may have COVID-19. This will help the healthcare providers office take steps to keep other people from getting infected or exposed. Wear a facemask  You should wear a facemask when you are around other people (e.g., sharing a room or vehicle) or pets and before you enter a healthcare providers office. If you are not able to wear a facemask (for example, because it causes trouble breathing), then people who live with you should not stay in the same room with you, or they should wear a facemask if they enter your room. Cover your coughs and sneezes  Cover your mouth and nose with a tissue when you cough or sneeze. Throw used tissues in a lined trash can. Immediately wash your hands with soap and water for at least 20 seconds or, if soap and water are not available, clean your hands with an alcohol-based hand  that contains at least 60% alcohol. Clean your hands often  Wash your hands often with soap and water for at least 20 seconds, especially after blowing your nose, coughing, or sneezing; going to the bathroom; and before eating or preparing food. If soap and water are not readily available, use an alcohol-based hand  with at least 60% alcohol, covering all surfaces of your hands and rubbing them together until they feel dry. Soap and water are the best option if hands are visibly dirty. Avoid touching your eyes, nose, and mouth with unwashed hands. Avoid sharing personal household items  You should not share dishes, drinking glasses, cups, eating utensils, towels, or bedding with other people or pets in your home.  After using these items, they should departments. [employer] allows you to send messages to your doctor, view your test results, renew your prescriptions, schedule appointments, view visit notes, and more. How Do I Sign Up? 1. In your Internet browser, go to [email or web address]  2. Click on the Sign Up Now link in the Sign In box. You will see the New Member Sign Up page. 3. Enter your [employer] Access Code exactly as it appears below. You will not need to use this code after youve completed the sign-up process. If you do not sign up before the expiration date, you must request a new code. [employer] Access Code: Activation code not generated  Current [employer] Status: Active    4. Enter your Social Security Number ([id number]) and Date of Birth (mm/dd/yyyy) as indicated and click Submit. You will be taken to the next sign-up page. 5. Create a [employer] ID. This will be your [employer] login ID and cannot be changed, so think of one that is secure and easy to remember. 6. Create a [employer] password. You can change your password at any time. 7. Enter your Password Reset Question and Answer. This can be used at a later time if you forget your password. 8. Enter your e-mail address. You will receive e-mail notification when new information is available in [address] 9. Click Sign Up. You can now view your medical record. Additional Information  If you have questions, please contact the physician practice where you receive care. Remember, [employer] is NOT to be used for urgent needs. For medical emergencies, dial 911. For questions regarding your [employer] account call [phone]. If you have a clinical question, please call your doctor's office.

## 2020-06-09 LAB
REPORT: NORMAL
SARS-COV-2: NOT DETECTED
THIS TEST SENT TO: NORMAL

## 2020-06-10 ENCOUNTER — HOSPITAL ENCOUNTER (OUTPATIENT)
Age: 70
Setting detail: SPECIMEN
Discharge: HOME | End: 2020-06-10
Payer: MEDICARE

## 2020-06-10 ENCOUNTER — ANESTHESIA (OUTPATIENT)
Dept: OPERATING ROOM | Age: 70
End: 2020-06-10

## 2020-06-10 ENCOUNTER — ANESTHESIA EVENT (OUTPATIENT)
Dept: OPERATING ROOM | Age: 70
End: 2020-06-10

## 2020-06-10 ENCOUNTER — APPOINTMENT (OUTPATIENT)
Dept: OPERATING ROOM | Age: 70
End: 2020-06-10

## 2020-06-10 ENCOUNTER — HOSPITAL ENCOUNTER (OUTPATIENT)
Age: 70
Setting detail: OUTPATIENT SURGERY
Discharge: HOME | End: 2020-06-10
Attending: INTERNAL MEDICINE | Admitting: INTERNAL MEDICINE
Payer: MEDICARE

## 2020-06-10 VITALS
HEIGHT: 64 IN | TEMPERATURE: 98 F | WEIGHT: 197 LBS | DIASTOLIC BLOOD PRESSURE: 72 MMHG | SYSTOLIC BLOOD PRESSURE: 130 MMHG | BODY MASS INDEX: 33.63 KG/M2 | OXYGEN SATURATION: 97 % | RESPIRATION RATE: 18 BRPM | HEART RATE: 78 BPM

## 2020-06-10 VITALS — DIASTOLIC BLOOD PRESSURE: 73 MMHG | OXYGEN SATURATION: 97 % | TEMPERATURE: 97 F | SYSTOLIC BLOOD PRESSURE: 112 MMHG

## 2020-06-10 PROCEDURE — G8907 PT DOC NO EVENTS ON DISCHARG: HCPCS

## 2020-06-10 PROCEDURE — G8918 PT W/O PREOP ORDER IV AB PRO: HCPCS

## 2020-06-10 PROCEDURE — 43239 EGD BIOPSY SINGLE/MULTIPLE: CPT

## 2020-06-10 PROCEDURE — 45380 COLONOSCOPY AND BIOPSY: CPT

## 2020-06-10 PROCEDURE — 88305 TISSUE EXAM BY PATHOLOGIST: CPT

## 2020-06-10 PROCEDURE — 43239 EGD BIOPSY SINGLE/MULTIPLE: CPT | Performed by: INTERNAL MEDICINE

## 2020-06-10 PROCEDURE — 45385 COLONOSCOPY W/LESION REMOVAL: CPT

## 2020-06-10 PROCEDURE — 45385 COLONOSCOPY W/LESION REMOVAL: CPT | Performed by: INTERNAL MEDICINE

## 2020-06-10 PROCEDURE — 88342 IMHCHEM/IMCYTCHM 1ST ANTB: CPT

## 2020-06-10 RX ORDER — HYDROMORPHONE HCL 110MG/55ML
0.5 PATIENT CONTROLLED ANALGESIA SYRINGE INTRAVENOUS EVERY 5 MIN PRN
Status: DISCONTINUED | OUTPATIENT
Start: 2020-06-10 | End: 2020-06-10 | Stop reason: HOSPADM

## 2020-06-10 RX ORDER — MORPHINE SULFATE 10 MG/ML
4 INJECTION, SOLUTION INTRAMUSCULAR; INTRAVENOUS EVERY 5 MIN PRN
Status: DISCONTINUED | OUTPATIENT
Start: 2020-06-10 | End: 2020-06-10 | Stop reason: HOSPADM

## 2020-06-10 RX ORDER — PROMETHAZINE HYDROCHLORIDE 25 MG/ML
6.25 INJECTION, SOLUTION INTRAMUSCULAR; INTRAVENOUS
Status: DISCONTINUED | OUTPATIENT
Start: 2020-06-10 | End: 2020-06-10 | Stop reason: HOSPADM

## 2020-06-10 RX ORDER — LIDOCAINE HYDROCHLORIDE 10 MG/ML
INJECTION, SOLUTION EPIDURAL; INFILTRATION; INTRACAUDAL; PERINEURAL PRN
Status: DISCONTINUED | OUTPATIENT
Start: 2020-06-10 | End: 2020-06-10 | Stop reason: SDUPTHER

## 2020-06-10 RX ORDER — PROPOFOL 10 MG/ML
INJECTION, EMULSION INTRAVENOUS PRN
Status: DISCONTINUED | OUTPATIENT
Start: 2020-06-10 | End: 2020-06-10 | Stop reason: SDUPTHER

## 2020-06-10 RX ORDER — SODIUM CHLORIDE 9 MG/ML
INJECTION, SOLUTION INTRAVENOUS CONTINUOUS
Status: DISCONTINUED | OUTPATIENT
Start: 2020-06-10 | End: 2020-06-10 | Stop reason: HOSPADM

## 2020-06-10 RX ORDER — DIPHENHYDRAMINE HYDROCHLORIDE 50 MG/ML
12.5 INJECTION INTRAMUSCULAR; INTRAVENOUS
Status: DISCONTINUED | OUTPATIENT
Start: 2020-06-10 | End: 2020-06-10 | Stop reason: HOSPADM

## 2020-06-10 RX ORDER — LABETALOL HYDROCHLORIDE 5 MG/ML
5 INJECTION, SOLUTION INTRAVENOUS EVERY 10 MIN PRN
Status: DISCONTINUED | OUTPATIENT
Start: 2020-06-10 | End: 2020-06-10 | Stop reason: HOSPADM

## 2020-06-10 RX ORDER — MORPHINE SULFATE 10 MG/ML
2 INJECTION, SOLUTION INTRAMUSCULAR; INTRAVENOUS EVERY 5 MIN PRN
Status: DISCONTINUED | OUTPATIENT
Start: 2020-06-10 | End: 2020-06-10 | Stop reason: HOSPADM

## 2020-06-10 RX ORDER — METOCLOPRAMIDE HYDROCHLORIDE 5 MG/ML
10 INJECTION INTRAMUSCULAR; INTRAVENOUS
Status: DISCONTINUED | OUTPATIENT
Start: 2020-06-10 | End: 2020-06-10 | Stop reason: HOSPADM

## 2020-06-10 RX ORDER — HYDRALAZINE HYDROCHLORIDE 20 MG/ML
5 INJECTION INTRAMUSCULAR; INTRAVENOUS EVERY 10 MIN PRN
Status: DISCONTINUED | OUTPATIENT
Start: 2020-06-10 | End: 2020-06-10 | Stop reason: HOSPADM

## 2020-06-10 RX ORDER — HYDROMORPHONE HCL 110MG/55ML
0.25 PATIENT CONTROLLED ANALGESIA SYRINGE INTRAVENOUS EVERY 5 MIN PRN
Status: DISCONTINUED | OUTPATIENT
Start: 2020-06-10 | End: 2020-06-10 | Stop reason: HOSPADM

## 2020-06-10 RX ADMIN — SODIUM CHLORIDE: 9 INJECTION, SOLUTION INTRAVENOUS at 08:55

## 2020-06-10 RX ADMIN — PROPOFOL 100 MG: 10 INJECTION, EMULSION INTRAVENOUS at 09:44

## 2020-06-10 RX ADMIN — PROPOFOL 100 MG: 10 INJECTION, EMULSION INTRAVENOUS at 09:58

## 2020-06-10 RX ADMIN — PROPOFOL 100 MG: 10 INJECTION, EMULSION INTRAVENOUS at 09:37

## 2020-06-10 RX ADMIN — PROPOFOL 100 MG: 10 INJECTION, EMULSION INTRAVENOUS at 10:05

## 2020-06-10 RX ADMIN — PROPOFOL 100 MG: 10 INJECTION, EMULSION INTRAVENOUS at 09:50

## 2020-06-10 RX ADMIN — LIDOCAINE HYDROCHLORIDE 50 MG: 10 INJECTION, SOLUTION EPIDURAL; INFILTRATION; INTRACAUDAL; PERINEURAL at 09:38

## 2020-06-10 RX ADMIN — PROPOFOL 100 MG: 10 INJECTION, EMULSION INTRAVENOUS at 09:40

## 2020-06-10 ASSESSMENT — PAIN - FUNCTIONAL ASSESSMENT: PAIN_FUNCTIONAL_ASSESSMENT: 0-10

## 2020-06-10 NOTE — OP NOTE
Patient: Juany De Jesus: [birth year]  Med Rec#: [id number] Acc#: [id number]   Primary Care Provider Aundrea Cochran MD    Date of Procedure:  6/10/2020    Endoscopist: Ainsley Darling MD    Referring Provider: Aundrea Cochran MD,     Operation Performed: Colonoscopy up to the terminal ileum with (1) hot snare resection of multiple polyps and (2) random cold biopsies    Indications: For EGD and colonoscopy exams today:    1. Upper abdominal pain          2. BRBPR (bright red blood per rectum)     3. Diarrhea, unspecified type     4. Bilateral lower abdominal cramping     5. Bloating          6. Early satiety     7. Irritable bowel syndrome with diarrhea     8. S/P cholecystectomy     9. Personal history of colonic polyps         Anesthesia:  Sedation was administered by anesthesia who monitored the patient during the procedure. I met with Josy Villa prior to procedure. We discussed the procedure itself, and I have discussed the risks of endoscopy (including-- but not limited to-- pain, discomfort, bleeding potentially requiring second endoscopic procedure and/or blood transfusion, organ perforation requiring operative repair, damage to organs near the colon, infection, aspiration, cardiopulmonary/allergic reaction), benefits, indications to endoscopy. Additionally, we discussed options other than colonoscopy. The patient expressed understanding. All questions answered. The patient decided to proceed with the procedure. Signed informed consent was placed on the chart. Blood Loss: minimal    Withdrawal time: >6 mins  Bowel Prep: adequate     Complications: no immediate complications    DESCRIPTION OF PROCEDURE:     A time out was performed. After written informed consent was obtained, the patient was placed in the left lateral position. The perianal area was inspected, and a digital rectal exam was performed. A rectal exam was performed: normal tone, no palpable lesions.  At this point, a forward viewing Olympus colonoscope was inserted into the anus and carefully advanced to the terminal ileum. The cecum was identified by the ileocecal valve and the appendiceal orifice. The colonoscope was then slowly withdrawn with careful inspection of the mucosa in a linear and circumferential fashion. The scope was retroflexed in the rectum. Suction was utilized during the procedure to remove as much air as possible from the bowel. The colonoscope was removed from the patient, and the procedure was terminated. Findings are listed below. Findings:     Two sessile proximal transverse colon polyps-6-7 mm in size and a 8 mm descending colon polyp were removed by hot snare resection. NO larger polyps or masses or strictures or colitis. Moderate Diverticulosis in the left colon  Internal hemorrhoids-Grade 2 and external hemorrhoids. Where it was clearly visible, the mucosa appeared normal throughout the entire examined colon  Retroflexion in the rectum was otherwise normal and revealed no further abnormalities     Recommendations:  1. Repeat colonoscopy: pending pathology - in 3 years due to her multiple polyps today and hx of polyps  2. Await biopsy results-you will receive a letter with your results within 7-10 days  - Resume previous meds and diet  - GI clinic f/u 4-6 weeks    - Keep scheduled f/u appts with other MDs     - NO ASA/NSAIDs x 2 weeks    Findings and recommendations were discussed w/ the patient. A copy of the images was provided.     Laura Barone MD  6/10/2020  9:36 AM

## 2020-06-10 NOTE — ANESTHESIA PRE PROCEDURE
symptoms involving cardiovascular system        Past Surgical History:        Procedure Laterality Date    APPENDECTOMY      CHOLECYSTECTOMY      COLONOSCOPY      not sure who did it, [place]-Benign polyps    COLONOSCOPY  2013    Dr. Liz Venegas x 2, 5 yr recall    HYSTERECTOMY, TOTAL ABDOMINAL      MO COLONOSCOPY W/BIOPSY SINGLE/MULTIPLE N/A 2017    Dr Ata Vazquez AP (-) dysplasia x 4--3 yr recall    TONSILLECTOMY AND ADENOIDECTOMY      UPPER GASTROINTESTINAL ENDOSCOPY      [place]       Social History:    Social History     Tobacco Use    Smoking status: Former Smoker     Last attempt to quit: 11/15/1986     Years since quittin.5    Smokeless tobacco: Never Used   Substance Use Topics    Alcohol use: Yes     Comment: socially                                Counseling given: Not Answered      Vital Signs (Current): There were no vitals filed for this visit. BP Readings from Last 3 Encounters:   06/10/20 (!) 155/77   20 112/80   17 114/78       NPO Status:                                                                                 BMI:   Wt Readings from Last 3 Encounters:   06/10/20 197 lb (89.4 kg)   20 197 lb 6.4 oz (89.5 kg)   17 193 lb (87.5 kg)     There is no height or weight on file to calculate BMI.    CBC: No results found for: WBC, RBC, HGB, HCT, MCV, RDW, PLT    CMP: No results found for: NA, K, CL, CO2, BUN, CREATININE, GFRAA, AGRATIO, LABGLOM, GLUCOSE, PROT, CALCIUM, BILITOT, ALKPHOS, AST, ALT    POC Tests: No results for input(s): POCGLU, POCNA, POCK, POCCL, POCBUN, POCHEMO, POCHCT in the last 72 hours.     Coags: No results found for: PROTIME, INR, APTT    HCG (If Applicable): No results found for: PREGTESTUR, PREGSERUM, HCG, HCGQUANT     ABGs: No results found for: PHART, PO2ART, SWI4QLU, KOT9UEX, BEART, T8CHENWX     Type & Screen (If Applicable):  No results found for: LABABO,

## 2020-06-10 NOTE — ANESTHESIA POSTPROCEDURE EVALUATION
Department of Anesthesiology  Postprocedure Note    Patient: Joseline Sol  MRN: [id number]  YOB: 1950  Date of evaluation: 6/10/2020  Time:  10:09 AM     Procedure Summary     Date:  06/10/20 Room / Location:  [facility] ENDO 02 / [address]    Anesthesia Start:  0465 Anesthesia Stop:  1009    Procedures:       EGD BIOPSY (N/A Esophagus)      COLONOSCOPY POLYPECTOMY SNARE/COLD BIOPSY (N/A Abdomen) Diagnosis:  (UPPER ABD PAIN, BLOATING, RB, DIARRHA, LOWER ABD PAIN)    Surgeon:  Dougie Jacobs MD Responsible Provider:  LASHAWN Piper CRNA    Anesthesia Type:  TIVA, MAC ASA Status:  2          Anesthesia Type: TIVA, MAC    Carlos Phase I:      Carlos Phase II:      Last vitals: Reviewed and per EMR flowsheets. Anesthesia Post Evaluation    Patient location during evaluation: bedside  Patient participation: complete - patient participated  Level of consciousness: awake  Pain score: 0  Airway patency: patent  Nausea & Vomiting: no vomiting and no nausea  Complications: no  Cardiovascular status: blood pressure returned to baseline  Respiratory status: acceptable and room air  Hydration status: stable  Comments: Report given to  Velma Bell RN. VSS. Adequate oxygen exchange on ra.

## 2020-07-15 ENCOUNTER — OFFICE VISIT (OUTPATIENT)
Dept: GASTROENTEROLOGY | Age: 70
End: 2020-07-15
Payer: MEDICARE

## 2020-07-15 VITALS
BODY MASS INDEX: 33.12 KG/M2 | WEIGHT: 194 LBS | DIASTOLIC BLOOD PRESSURE: 84 MMHG | HEART RATE: 70 BPM | OXYGEN SATURATION: 98 % | HEIGHT: 64 IN | SYSTOLIC BLOOD PRESSURE: 128 MMHG

## 2020-07-15 PROCEDURE — G8400 PT W/DXA NO RESULTS DOC: HCPCS | Performed by: NURSE PRACTITIONER

## 2020-07-15 PROCEDURE — 1090F PRES/ABSN URINE INCON ASSESS: CPT | Performed by: NURSE PRACTITIONER

## 2020-07-15 PROCEDURE — 1036F TOBACCO NON-USER: CPT | Performed by: NURSE PRACTITIONER

## 2020-07-15 PROCEDURE — G8417 CALC BMI ABV UP PARAM F/U: HCPCS | Performed by: NURSE PRACTITIONER

## 2020-07-15 PROCEDURE — 99214 OFFICE O/P EST MOD 30 MIN: CPT | Performed by: NURSE PRACTITIONER

## 2020-07-15 PROCEDURE — 3017F COLORECTAL CA SCREEN DOC REV: CPT | Performed by: NURSE PRACTITIONER

## 2020-07-15 PROCEDURE — 1123F ACP DISCUSS/DSCN MKR DOCD: CPT | Performed by: NURSE PRACTITIONER

## 2020-07-15 PROCEDURE — 4040F PNEUMOC VAC/ADMIN/RCVD: CPT | Performed by: NURSE PRACTITIONER

## 2020-07-15 PROCEDURE — G8427 DOCREV CUR MEDS BY ELIG CLIN: HCPCS | Performed by: NURSE PRACTITIONER

## 2020-07-15 ASSESSMENT — ENCOUNTER SYMPTOMS
RECTAL PAIN: 0
SORE THROAT: 0
ABDOMINAL PAIN: 1
NAUSEA: 0
CONSTIPATION: 0
COUGH: 0
DIARRHEA: 1
VOICE CHANGE: 0
VOMITING: 0
ABDOMINAL DISTENTION: 0
TROUBLE SWALLOWING: 0
SHORTNESS OF BREATH: 0
BLOOD IN STOOL: 0
ANAL BLEEDING: 1
BACK PAIN: 0

## 2020-07-15 NOTE — PROGRESS NOTES
Subjective:      Libby Palomares is a76 y.o. female  Chief Complaint   Patient presents with    Follow-up     4-6 wk from colon and endo       HPI  PCP: Jesica Richard MD  Referring Provider:Dr Lesia Wheeler MD  Pt is here s/p EGD and colonoscopy to discuss results. Denies post-procedure complications. Results of scopes in history per MA per OV policy, I reviewed it and we discussed everything. No etiology found for her c/o chronic upper and lower abd pain. She reports her stools are soft sometimes and sometimes loose, but not overt diarrhea, and has a stool 3-4 times a day on average. And she doesn't wish to take medications for this. Because she reports her major problem is the lower abdominal pain. Reports her lower abd pain is debilitating. Has tried Bentyl, which doesn't help. Her BP drops when she has the pain and has to lay on the floor for extended period of time when the pain comes on. As noted in previous records the lower abd pain is chronic since childhood and the upper abd pain started in early May 2020. And she is s/p claribel. The lower abd pain \"spells\" occurs once a week or every few weeks on average and there is no known triggers. No hx of CAD, arrythmias, and valvular disease. Family HX:    Pt denies family hx of colon polyps, colon CA, inflammatory bowel dx, gastric CA and esophageal CA.     Past Medical History:   Diagnosis Date    Abdominal pain, unspecified site     Encounter for long-term (current) use of other medications     Hiatal hernia     History of colon polyps     benign    Hypothyroidism     Other symptoms involving cardiovascular system           Past Surgical History:   Procedure Laterality Date    APPENDECTOMY      CHOLECYSTECTOMY      COLONOSCOPY  2003    not sure who did it, [place]-Benign polyps    COLONOSCOPY  09/16/2013    Dr. Praful Blancas x 2, 5 yr recall    COLONOSCOPY N/A 6/10/2020    Dr Natalie Sumner, internal and external hemorrhoids-AP x 3, random colon bx NEGATIVE, 3 yr recall    HYSTERECTOMY, TOTAL ABDOMINAL      [place] COLONOSCOPY W/BIOPSY SINGLE/MULTIPLE N/A 2017    Dr Bauman Ear AP (-) dysplasia x 4--3 yr recall    TONSILLECTOMY AND ADENOIDECTOMY      UPPER GASTROINTESTINAL ENDOSCOPY  2003    [place]    UPPER GASTROINTESTINAL ENDOSCOPY N/A 6/10/2020    Dr Reanna Rubio normal,NEG h pylori, NEG celiac       Social History     Socioeconomic History    Marital status:      Spouse name: None    Number of children: None    Years of education: None    Highest education level: None   Occupational History    None   Social Needs    Financial resource strain: None    Food insecurity     Worry: None     Inability: None    Transportation needs     Medical: None     Non-medical: None   Tobacco Use    Smoking status: Former Smoker     Packs/day: 1.00     Years: 19.00     Pack years: 19.00     Last attempt to quit: 11/15/1986     Years since quittin.6    Smokeless tobacco: Never Used   Substance and Sexual Activity    Alcohol use: Yes     Comment: rarely    Drug use: No    Sexual activity: None   Lifestyle    Physical activity     Days per week: None     Minutes per session: None    Stress: None   Relationships    Social connections     Talks on phone: None     Gets together: None     Attends Oriental orthodox service: None     Active member of club or organization: None     Attends meetings of clubs or organizations: None     Relationship status: None    Intimate partner violence     Fear of current or ex partner: None     Emotionally abused: None     Physically abused: None     Forced sexual activity: None   Other Topics Concern    None   Social History Narrative    None       Allergies   Allergen Reactions    Meperidine Other (See Comments)     Blood pressure drops, she is still able to use this.    Other reaction(s): Unknown - High Severity    Codeine Nausea And Vomiting     Nausea, vomiting, dizziness Current Outpatient Medications   Medication Sig Dispense Refill    hyoscyamine (LEVSIN/SL) 125 MCG sublingual tablet Place 1 tablet under the tongue every 6 hours as needed for Cramping or Diarrhea 45 tablet 3    dicyclomine (BENTYL) 10 MG capsule Take 10 mg by mouth 4 times daily as needed       levothyroxine (SYNTHROID) 112 MCG tablet Take 112 mcg by mouth every morning (before breakfast)       estradiol (ESTRACE) 1 MG tablet Take 1 mg by mouth daily. No current facility-administered medications for this visit. Review of Systems   Constitutional: Negative for appetite change, fatigue, fever and unexpected weight change. HENT: Negative for sore throat, trouble swallowing and voice change. Respiratory: Negative for cough and shortness of breath. Cardiovascular: Negative for chest pain, palpitations and leg swelling. Gastrointestinal: Positive for abdominal pain, anal bleeding and diarrhea (loose stools, no overt diarrhea). Negative for abdominal distention, blood in stool, constipation, nausea, rectal pain and vomiting. Genitourinary: Negative for hematuria. Musculoskeletal: Negative for arthralgias, back pain and neck pain. Neurological: Negative for dizziness, weakness, light-headedness and headaches. Psychiatric/Behavioral: Negative for dysphoric mood and sleep disturbance. The patient is not nervous/anxious. All other systems reviewed and are negative. Objective:     Physical Exam  Vitals signs and nursing note reviewed. Constitutional:       Appearance: She is well-developed. Comments: /84   Pulse 70   Ht 5' 4\" (1.626 m)   Wt 194 lb (88 kg)   SpO2 98%   BMI 33.30 kg/m²    Eyes:      General: No scleral icterus. Conjunctiva/sclera: Conjunctivae normal.      Pupils: Pupils are equal, round, and reactive to light. Neck:      Musculoskeletal: Normal range of motion and neck supple. Thyroid: No thyromegaly.    Cardiovascular:      Rate and Rhythm: Normal rate and regular rhythm. Heart sounds: Normal heart sounds. No murmur. No friction rub. No gallop. Pulmonary:      Effort: Pulmonary effort is normal. No respiratory distress. Breath sounds: Normal breath sounds. Abdominal:      General: Bowel sounds are normal. There is no distension. Palpations: Abdomen is soft. Tenderness: There is no abdominal tenderness. There is no rebound. Musculoskeletal: Normal range of motion. General: No deformity. Neurological:      Mental Status: She is alert and oriented to person, place, and time. Cranial Nerves: No cranial nerve deficit. Psychiatric:         Judgment: Judgment normal.           Assessment:       Diagnosis Orders   1. Bilateral lower abdominal cramping  hyoscyamine (LEVSIN/SL) 125 MCG sublingual tablet    CT ABDOMEN PELVIS W IV CONTRAST Additional Contrast? Oral   2. Upper abdominal pain  CT ABDOMEN PELVIS W IV CONTRAST Additional Contrast? Oral         Plan:      1. Will try Levsin for prn use. This was escribed  2. CT abd/pelvis with IV and oral contrast. Will call her with results. If negative can do CTA to r/o mesenteric ischemia. Can offer her appt with Dr Johanny Salazar or referral to tertiary care center if all the testing is negative.

## 2020-07-21 ENCOUNTER — HOSPITAL ENCOUNTER (OUTPATIENT)
Dept: CT IMAGING | Age: 70
Discharge: HOME | End: 2020-07-21
Payer: MEDICARE

## 2020-07-21 ENCOUNTER — TELEPHONE (OUTPATIENT)
Dept: GASTROENTEROLOGY | Age: 70
End: 2020-07-21

## 2020-07-21 PROCEDURE — 6360000004 HC RX CONTRAST MEDICATION: Performed by: NURSE PRACTITIONER

## 2020-07-21 PROCEDURE — 74177 CT ABD & PELVIS W/CONTRAST: CPT

## 2020-07-21 RX ADMIN — IOPAMIDOL 75 ML: 755 INJECTION, SOLUTION INTRAVENOUS at 09:26

## 2020-07-21 NOTE — TELEPHONE ENCOUNTER
Please let Mary Eagle know the contrasted CT scan of her abdomen is unrevealing for etiology of her pain. It does note a mild fatty liver, treatment for this is weight loss and low fat diet. It also notes chronic lung disease, she should f/u with her PCP if this is a new finding. We discussed in clinic another CT looking at vessels that supply her gut. Would she like to proceed with this? Or she can have an appt with Dr Yevgeniy Dong if she would like to do this, for another opinion.  thanks

## 2020-07-23 ENCOUNTER — OFFICE VISIT (OUTPATIENT)
Dept: INTERNAL MEDICINE | Facility: CLINIC | Age: 70
End: 2020-07-23

## 2020-07-23 VITALS
OXYGEN SATURATION: 98 % | DIASTOLIC BLOOD PRESSURE: 74 MMHG | HEIGHT: 64 IN | WEIGHT: 193 LBS | SYSTOLIC BLOOD PRESSURE: 130 MMHG | HEART RATE: 77 BPM | BODY MASS INDEX: 32.95 KG/M2 | TEMPERATURE: 98 F

## 2020-07-23 DIAGNOSIS — K58.9 IRRITABLE BOWEL SYNDROME WITHOUT DIARRHEA: ICD-10-CM

## 2020-07-23 DIAGNOSIS — J84.9 CHRONIC INTERSTITIAL LUNG DISEASE (HCC): ICD-10-CM

## 2020-07-23 DIAGNOSIS — E03.9 HYPOTHYROIDISM, UNSPECIFIED TYPE: Primary | ICD-10-CM

## 2020-07-23 DIAGNOSIS — Z77.22 EXPOSURE TO SECOND HAND TOBACCO SMOKE: ICD-10-CM

## 2020-07-23 PROBLEM — R10.32 BILATERAL LOWER ABDOMINAL CRAMPING: Status: ACTIVE | Noted: 2020-05-22

## 2020-07-23 PROBLEM — Z90.49 S/P CHOLECYSTECTOMY: Status: ACTIVE | Noted: 2020-05-22

## 2020-07-23 PROBLEM — K62.5 BRBPR (BRIGHT RED BLOOD PER RECTUM): Status: ACTIVE | Noted: 2020-05-22

## 2020-07-23 PROBLEM — R19.7 DIARRHEA: Status: ACTIVE | Noted: 2020-05-22

## 2020-07-23 PROBLEM — R68.81 EARLY SATIETY: Status: ACTIVE | Noted: 2020-05-22

## 2020-07-23 PROBLEM — R10.10 UPPER ABDOMINAL PAIN: Status: ACTIVE | Noted: 2020-05-22

## 2020-07-23 PROBLEM — R10.31 BILATERAL LOWER ABDOMINAL CRAMPING: Status: ACTIVE | Noted: 2020-05-22

## 2020-07-23 PROBLEM — R14.0 BLOATING: Status: ACTIVE | Noted: 2020-05-22

## 2020-07-23 PROCEDURE — 99214 OFFICE O/P EST MOD 30 MIN: CPT | Performed by: NURSE PRACTITIONER

## 2020-07-23 NOTE — PROGRESS NOTES
Subjective   Zabrina Contreras is a 69 y.o. female.   Chief Complaint   Patient presents with   • Follow-up     Go over CT results.        Mrs. Contreras is a pleasant 69-year-old female who presents to the clinic today for a follow-up to discuss CT scan results.  Patient has a extensive history of GI complaints.  Her GI complaints include 30 minutes after eating having diarrhea, painful bowel movements, feeling faint, blood in stool, hemorrhoids, intermittent vomiting and bloating and lower abdomen pain.  Patient has had a upper and lower scope completed.  They noted gastritis and ruled out H. pylori infection and most likely chemical exposure patient had been taking NSAIDs intermittently and advised to discontinue use.  Her colonoscopy was benign for malignant changes, had noted diverticulosis but no infection indicated.  Patient denies any exposure to C. difficile.  Patient has not had recent hospitalization or illness or injury.      Incidentally on the CT scan they found mild chronic interstitial disease of the lung base and patient is concerned and would like second opinion from pulmonary.  Patient reports that her mother had smoked in the home with her for 30 years and is concerned now that that secondhand exposure has placed her in the COPD spectrum.  Patient's mother had  of COPD and is very concerned today.  She denies any shortness of breath, chronic cough, painful breathing, wheezing, or chest pain with exertion.  Patient did meet with chiropractor and he encouraged her to complete deep breathing exercises couple times a day.  She reports that she is trying to do this first thing in the morning but sometimes is forgetful to complete.  Patient has been trying to increase her activity by working in her yard more often, however the hotter weather has prevented her from being outside longer than 30 minutes at a time.      She has been working on her diet to indicate which foods have triggered her gastral issues.   "She states that she sometimes will have sensitivity to certain food eliminated at and her symptoms will improve for short term and then will recur with foods that had not triggered her in the past.  Patient reports that her stress level might increase her number of \"spells \".  She has been trying to eat more yogurt.  And reports that drinking tumor T has helped her symptoms.  Patient reported bloody stools in the past, however denies current issues.  She is having diarrhea 1-2 times a week still.  She is only taking her Bentyl once a day and was advised by her gastroenterologist to take 3 times a day.  Patient has started taking 3 times a day within the last 5 days.  She reports no change in her symptoms at this time.    Patient has hypothyroidism.  She is currently on Synthroid 100 mcg.  She denies having hyper or hypothyroid symptoms.  She reports that in the past when her thyroid levels were off she was having more frequent palpitations.  However she denies any recent episodes of palpitations.  She would like to get her TSH checked today since it is been close to 6 months.  Her blood pressure is appropriate and is not currently on medical therapy.       The following portions of the patient's history were reviewed and updated as appropriate: allergies, current medications, past family history, past medical history, past social history, past surgical history and problem list.    Review of Systems   Constitutional: Negative for activity change, appetite change, fatigue, fever, unexpected weight gain and unexpected weight loss.   HENT: Negative for swollen glands, trouble swallowing and voice change.    Eyes: Negative for blurred vision and visual disturbance.   Respiratory: Negative for cough and shortness of breath.    Cardiovascular: Negative for chest pain, palpitations and leg swelling.   Gastrointestinal: Positive for abdominal pain, diarrhea and indigestion. Negative for constipation, nausea and vomiting.     "    Having 1-2 episodes of diarrhea a week, abdominal pain is lower and intermittent.  Patient denies more than mild tenderness with deep palpation.   Endocrine: Negative for cold intolerance, heat intolerance, polydipsia and polyphagia.   Genitourinary: Negative for decreased urine volume, dysuria, frequency, pelvic pain, urgency and urinary incontinence.   Musculoskeletal: Negative for arthralgias, back pain, joint swelling and neck pain.   Skin: Negative for color change, rash and skin lesions.   Neurological: Negative for dizziness, weakness, headache, memory problem and confusion.   Hematological: Does not bruise/bleed easily.   Psychiatric/Behavioral: Negative for agitation, hallucinations and suicidal ideas. The patient is not nervous/anxious.        Objective   Past Medical History:   Diagnosis Date   • Chest pain 1/6/2017   • Disease of thyroid gland    • Hyperlipidemia    • Hypocholesteremia 1/6/2017   • Hypothyroidism 1/6/2017   • Microcytic anemia 1/6/2017   • Otalgia 1/6/2017   • Palpitations 1/6/2017      Past Surgical History:   Procedure Laterality Date   • APPENDECTOMY     • CHOLECYSTECTOMY     • HYSTERECTOMY     • TONSILLECTOMY          Current Outpatient Medications:   •  dicyclomine (BENTYL) 10 MG capsule, Take 1 capsule by mouth 3 (Three) Times a Day., Disp: 270 capsule, Rfl: 3  •  estradiol (ESTRACE) 1 MG tablet, Take 1 tablet by mouth Daily., Disp: 90 tablet, Rfl: 3  •  hyoscyamine (LEVSIN) 0.125 MG SL tablet, Place 0.125 mg under the tongue As Needed., Disp: , Rfl:   •  levothyroxine (SYNTHROID) 100 MCG tablet, Take 1 tablet by mouth Daily., Disp: 90 tablet, Rfl: 3     Vitals:    07/23/20 0812   BP: 130/74   Pulse: 77   Temp: 98 °F (36.7 °C)   SpO2: 98%         07/23/20 0812   Weight: 87.5 kg (193 lb)     Patient's Body mass index is 33.13 kg/m². BMI is above normal parameters. Recommendations include: educational material, exercise counseling and nutrition counseling.      Physical Exam    Constitutional: She is oriented to person, place, and time. She appears well-developed and well-nourished. She is obese.  HENT:   Head: Normocephalic and atraumatic.   Right Ear: Hearing and external ear normal.   Left Ear: Hearing and external ear normal.   Nose: Nose normal.   Mouth/Throat: Uvula is midline and oropharynx is clear and moist.   Eyes: Pupils are equal, round, and reactive to light. Conjunctivae and EOM are normal.   Neck: Trachea normal and normal range of motion. Neck supple. Carotid bruit is not present. No thyromegaly present.   Cardiovascular: Normal rate, regular rhythm, normal heart sounds and intact distal pulses.   Pulmonary/Chest: Effort normal. No accessory muscle usage. No tachypnea. No respiratory distress. She has decreased breath sounds in the right lower field. She has no wheezes. She has no rhonchi. She has no rales.   Abdominal: Soft. Bowel sounds are normal. She exhibits no mass. There is tenderness in the right lower quadrant and left lower quadrant. There is no rigidity, no rebound, no guarding and no CVA tenderness.   Musculoskeletal: Normal range of motion.   Lymphadenopathy:     She has no cervical adenopathy.     She has no axillary adenopathy.   Neurological: She is alert and oriented to person, place, and time. She has normal strength. She displays a negative Romberg sign.   Skin: Skin is warm, dry and intact. Capillary refill takes less than 2 seconds. Turgor is normal. No rash noted.   Psychiatric: She has a normal mood and affect. Her speech is normal and behavior is normal. Thought content normal. Cognition and memory are normal.             Assessment/Plan   Diagnoses and all orders for this visit:    1. Hypothyroidism, unspecified type (Primary)  -     TSH    2. Irritable bowel syndrome without diarrhea    3. Chronic interstitial lung disease (CMS/HCC)  Comments:  found incidentally on CT abdomen; No symptoms at this time.    4. Exposure to second hand tobacco  "smoke        Patient is concerned about mild chronic interstitial disease at lung base found on CT of abdomen incidentally.  She would like to be referred to Dr. Giordano's office for pulmonary function testing and second opinion.  Patient had reported 30+ years of living with her mother who smoked inside.  She is to continue completing breathing exercises recommended by her chiropractor and daily use of her incentive spirometer.  I advised her to increase her exercise to walking 30 minutes a day.  Patient denies any shortness of breath wheezing and is only slightly decreased in the right lung base today.  Will consider albuterol inhaler use every 4 hours as needed as needed if indicated symptomatic.    Patient has not had her thyroid checked since December and would like her TSH checked today regarding changes in her thyroid function causing palpitations in the past.  Patient denies palpitations at visit today.    Patient is working with [employer] in regards to her \"spells \"of eating and having episodes of diarrhea, painful bowel movements, feeling faint, intermittent vomiting, and history of finding gastritis on her upper endoscopy and diverticulosis.  Patient has had colonoscopy which indicated diverticulosis without-itis.  Advised patient to try only Tylenol use was using NSAIDs but reports only minimally.  Advised patient to continue eating active via yogurt and when consuming tumorectomy ensure she consumes it with a full belly.  Patient was only taking Bentyl once a day and was advised by the GI group to take 3 times a day.  She reports that symptoms have not improved yet however has only been taking 3 times a day for less than a week now.  Patient denies hemorrhoids however has suppositories as needed.  Will have patient return in 3 months just to check on breathing and abdominal symptoms.         "

## 2020-07-24 LAB — TSH SERPL DL<=0.005 MIU/L-ACNC: 6.77 UIU/ML (ref 0.27–4.2)

## 2020-07-28 ENCOUNTER — TELEPHONE (OUTPATIENT)
Dept: INTERNAL MEDICINE | Facility: CLINIC | Age: 70
End: 2020-07-28

## 2020-07-29 DIAGNOSIS — E03.9 HYPOTHYROIDISM, UNSPECIFIED TYPE: ICD-10-CM

## 2020-07-29 DIAGNOSIS — J84.9 CHRONIC INTERSTITIAL LUNG DISEASE (HCC): Primary | ICD-10-CM

## 2020-07-29 LAB
Lab: NORMAL
T3FREE SERPL-MCNC: 2.3 PG/ML (ref 2–4.4)
T4 FREE SERPL-MCNC: 1.16 NG/DL (ref 0.93–1.7)
WRITTEN AUTHORIZATION: NORMAL

## 2020-07-29 RX ORDER — LEVOTHYROXINE SODIUM 112 UG/1
112 TABLET ORAL DAILY
Qty: 30 TABLET | Refills: 1
Start: 2020-07-29 | End: 2020-07-29 | Stop reason: SDUPTHER

## 2020-07-29 RX ORDER — LEVOTHYROXINE SODIUM 112 UG/1
112 TABLET ORAL DAILY
Qty: 90 TABLET | Refills: 0 | Status: SHIPPED | OUTPATIENT
Start: 2020-07-29 | End: 2021-01-08

## 2020-07-29 NOTE — TELEPHONE ENCOUNTER
( Completed)    Called patient and informed her that we would completed a PFT first and then referral would be made for one of the pulmonary providers to establish care after review. She verbalized understanding. No other question or concerns were addressed. Thank you.

## 2020-08-25 ENCOUNTER — TRANSCRIBE ORDERS (OUTPATIENT)
Dept: ADMINISTRATIVE | Facility: HOSPITAL | Age: 70
End: 2020-08-25

## 2020-08-25 DIAGNOSIS — Z01.818 PREOP TESTING: Primary | ICD-10-CM

## 2020-09-02 ENCOUNTER — TELEPHONE (OUTPATIENT)
Dept: GASTROENTEROLOGY | Age: 70
End: 2020-09-02

## 2020-09-02 NOTE — TELEPHONE ENCOUNTER
I need to know how often she is taking it so I can prescribe the right amt please because the original RX was for prn use

## 2020-09-02 NOTE — TELEPHONE ENCOUNTER
Mandi Cagle, I called the patient and she said most days she can get by with just one daily but on occasion she will take it Bid.  Stanley duron

## 2020-09-02 NOTE — TELEPHONE ENCOUNTER
Assessment:       Diagnosis Orders   1. Bilateral lower abdominal cramping  hyoscyamine (LEVSIN/SL) 125 MCG sublingual tablet     CT ABDOMEN PELVIS W IV CONTRAST Additional Contrast? Oral   2. Upper abdominal pain  CT ABDOMEN PELVIS W IV CONTRAST Additional Contrast? Oral                   Plan:      1. Will try Levsin for prn use. This was escribed  2. CT abd/pelvis with IV and oral contrast. Will call her with results. If negative can do CTA to r/o mesenteric ischemia. Can offer her appt with Dr Jeanine Rangel or referral to tertiary care center if all the testing is negative. Last OV [facility] aprn 7-15-20. CT Scan completed 7-21-20. Egd/Cln completed by Roseanne Porter 6-10-20. No FU is scheduled. Patient called about the Hyoscyamine 0.125 mg you prescribed for her stating it really works well for her and the Rx was sent to [facility] order and each Rx is a $20.00 co-pay, the patient is asking if [facility] would agree to [place] [facility] [place] 90 day supply with enough refills for one year and her co-pay for the 90 day supply will be $20.00 for 3 months. I will forward to [facility] for review. I told the patient if there is a problem with the request we will call her back and if not then [facility] will send the requested Rx to her [facility] [place] [facility].  Stanley duron

## 2020-09-07 ENCOUNTER — LAB (OUTPATIENT)
Dept: LAB | Facility: HOSPITAL | Age: 70
End: 2020-09-07

## 2020-09-07 PROCEDURE — U0003 INFECTIOUS AGENT DETECTION BY NUCLEIC ACID (DNA OR RNA); SEVERE ACUTE RESPIRATORY SYNDROME CORONAVIRUS 2 (SARS-COV-2) (CORONAVIRUS DISEASE [COVID-19]), AMPLIFIED PROBE TECHNIQUE, MAKING USE OF HIGH THROUGHPUT TECHNOLOGIES AS DESCRIBED BY CMS-2020-01-R: HCPCS | Performed by: NURSE PRACTITIONER

## 2020-09-07 PROCEDURE — C9803 HOPD COVID-19 SPEC COLLECT: HCPCS | Performed by: NURSE PRACTITIONER

## 2020-09-09 ENCOUNTER — RESULTS ENCOUNTER (OUTPATIENT)
Dept: INTERNAL MEDICINE | Facility: CLINIC | Age: 70
End: 2020-09-09

## 2020-09-09 DIAGNOSIS — E03.9 HYPOTHYROIDISM, UNSPECIFIED TYPE: ICD-10-CM

## 2020-09-09 LAB
COVID LABCORP PRIORITY: NORMAL
SARS-COV-2 RNA RESP QL NAA+PROBE: NOT DETECTED

## 2020-09-10 ENCOUNTER — HOSPITAL ENCOUNTER (OUTPATIENT)
Dept: PULMONOLOGY | Facility: HOSPITAL | Age: 70
Discharge: HOME | End: 2020-09-10
Admitting: NURSE PRACTITIONER

## 2020-09-10 DIAGNOSIS — J84.9 CHRONIC INTERSTITIAL LUNG DISEASE (HCC): ICD-10-CM

## 2020-09-10 PROCEDURE — 94060 EVALUATION OF WHEEZING: CPT

## 2020-09-10 PROCEDURE — 94729 DIFFUSING CAPACITY: CPT

## 2020-09-10 PROCEDURE — 94729 DIFFUSING CAPACITY: CPT | Performed by: INTERNAL MEDICINE

## 2020-09-10 PROCEDURE — 94726 PLETHYSMOGRAPHY LUNG VOLUMES: CPT

## 2020-09-10 PROCEDURE — 94726 PLETHYSMOGRAPHY LUNG VOLUMES: CPT | Performed by: INTERNAL MEDICINE

## 2020-09-10 PROCEDURE — 94060 EVALUATION OF WHEEZING: CPT | Performed by: INTERNAL MEDICINE

## 2020-09-10 RX ORDER — ALBUTEROL SULFATE 2.5 MG/3ML
2.5 SOLUTION RESPIRATORY (INHALATION) ONCE
Status: COMPLETED | OUTPATIENT
Start: 2020-09-10 | End: 2020-09-10

## 2020-09-10 RX ADMIN — ALBUTEROL SULFATE 2.5 MG: 2.5 SOLUTION RESPIRATORY (INHALATION) at 07:42

## 2020-09-16 NOTE — PROGRESS NOTES
LG Torres  [facility]   Respiratory Disease Clinic  [address] [place] [address]  Phone: [phone]  Fax: [phone]     Zabrina Contreras is a 70 y.o. female.   CC:   Chief Complaint   Patient presents with   • Chronic ILD, second opinion     exposure as a child to asbestos        HPI: This is a pleasant new patient referral from primary care.  She is recently had a lot of GI problems and had an abdominal CT at [facility] that indicated some mild chronic interstitial lung disease at the bases.  She is completely asymptomatic from a pulmonary standpoint.  She smoked but she quit greater than 35 years ago.  She reports that her mother smoked and  of COPD so she wanted to assure herself that her lung function is okay.  She had pulmonary function testing done at [facility] last week that showed normal spirometry with normal diffusion capacity.  Since the patient does not have any other symptoms I do not think she requires any additional pulmonary work-up at this time particularly given her normal diffusion capacity I do not see a need to proceed with high-res CT.  The patient expresses gratitude that her PFTs were normal.    The following portions of the patient's history were reviewed and updated as appropriate: allergies, current medications, past family history, past medical history, past social history, past surgical history and problem list.    Past Medical History:   Diagnosis Date   • Chest pain 2017   • Disease of thyroid gland    • Hyperlipidemia    • Hypocholesteremia 2017   • Hypothyroidism 2017   • Microcytic anemia 2017   • Otalgia 2017   • Palpitations 2017       Family History   Problem Relation Age of Onset   • COPD Mother    • No Known Problems Father        Social History     Socioeconomic History   • Marital status:      Spouse name: Not on file   • Number of children: Not on file   • Years of education: Not on file   •  "Highest education level: Not on file   Tobacco Use   • Smoking status: Former Smoker     Packs/day: 1.00     Years: 19.00     Pack years: 19.00     Quit date:      Years since quittin.7   • Smokeless tobacco: Never Used   • Tobacco comment: Quit 25 years ago   Substance and Sexual Activity   • Alcohol use: Yes     Comment: occasional   • Drug use: No   • Sexual activity: Defer       Review of Systems   Constitutional: Negative for appetite change, chills, fatigue and fever.   HENT: Negative for swollen glands, trouble swallowing and voice change.    Eyes: Negative for visual disturbance.   Respiratory: Negative for cough, shortness of breath and wheezing.    Cardiovascular: Negative for chest pain and leg swelling.   Gastrointestinal: Negative for abdominal distention, abdominal pain, nausea and vomiting.   Genitourinary: Negative.    Musculoskeletal: Negative for gait problem and myalgias.   Skin: Negative.    Neurological: Negative for weakness.   Hematological: Negative.    Psychiatric/Behavioral: The patient is not nervous/anxious.        /86   Pulse 74   Temp 97.7 °F (36.5 °C)   Ht 162.6 cm (64\")   Wt 86.8 kg (191 lb 6.4 oz)   SpO2 96% Comment: RA  BMI 32.85 kg/m²     Physical Exam  Vitals signs reviewed.   Constitutional:       General: She is not in acute distress.     Appearance: Normal appearance.   HENT:      Head: Normocephalic and atraumatic.      Comments: Wearing a mask  Eyes:      Pupils: Pupils are equal, round, and reactive to light.   Neck:      Musculoskeletal: Normal range of motion.   Cardiovascular:      Rate and Rhythm: Normal rate and regular rhythm.   Pulmonary:      Effort: Pulmonary effort is normal.      Breath sounds: Normal breath sounds.   Abdominal:      General: Bowel sounds are normal.   Skin:     General: Skin is warm and dry.   Neurological:      Mental Status: She is alert and oriented to person, place, and time.   Psychiatric:         Mood and Affect: Mood " normal.         Behavior: Behavior normal.           Zabrina was seen today for chronic ild, second opinion.    Diagnoses and all orders for this visit:    Chronic interstitial lung disease (CMS/HCC)    Exposure to second hand tobacco smoke    Overweight      Patient's Body mass index is 32.85 kg/m². BMI is above normal parameters. Recommendations include: referral to primary care.      Follow-up/Plan: No further pulmonary work-up needed at this time.  The patient can be seen on an as-needed basis.    Bradford Au, APRN  9/18/2020  10:24 CDT

## 2020-09-18 ENCOUNTER — OFFICE VISIT (OUTPATIENT)
Dept: PULMONOLOGY | Facility: CLINIC | Age: 70
End: 2020-09-18

## 2020-09-18 VITALS
HEIGHT: 64 IN | SYSTOLIC BLOOD PRESSURE: 154 MMHG | TEMPERATURE: 97.7 F | OXYGEN SATURATION: 96 % | WEIGHT: 191.4 LBS | HEART RATE: 74 BPM | DIASTOLIC BLOOD PRESSURE: 86 MMHG | BODY MASS INDEX: 32.68 KG/M2

## 2020-09-18 DIAGNOSIS — E66.3 OVERWEIGHT: ICD-10-CM

## 2020-09-18 DIAGNOSIS — J84.9 CHRONIC INTERSTITIAL LUNG DISEASE (HCC): Primary | ICD-10-CM

## 2020-09-18 DIAGNOSIS — Z77.22 EXPOSURE TO SECOND HAND TOBACCO SMOKE: ICD-10-CM

## 2020-09-18 PROCEDURE — 99214 OFFICE O/P EST MOD 30 MIN: CPT | Performed by: NURSE PRACTITIONER

## 2020-10-22 ENCOUNTER — OFFICE VISIT (OUTPATIENT)
Dept: INTERNAL MEDICINE | Facility: CLINIC | Age: 70
End: 2020-10-22

## 2020-10-22 VITALS
BODY MASS INDEX: 32.78 KG/M2 | HEIGHT: 64 IN | DIASTOLIC BLOOD PRESSURE: 86 MMHG | HEART RATE: 84 BPM | WEIGHT: 192 LBS | TEMPERATURE: 97.1 F | OXYGEN SATURATION: 96 % | SYSTOLIC BLOOD PRESSURE: 124 MMHG

## 2020-10-22 DIAGNOSIS — H65.111 NON-RECURRENT SUBACUTE ALLERGIC OTITIS MEDIA OF RIGHT EAR: ICD-10-CM

## 2020-10-22 DIAGNOSIS — R10.32 BILATERAL LOWER ABDOMINAL CRAMPING: Primary | ICD-10-CM

## 2020-10-22 DIAGNOSIS — R10.31 BILATERAL LOWER ABDOMINAL CRAMPING: Primary | ICD-10-CM

## 2020-10-22 DIAGNOSIS — E03.9 HYPOTHYROIDISM, UNSPECIFIED TYPE: ICD-10-CM

## 2020-10-22 PROCEDURE — 99213 OFFICE O/P EST LOW 20 MIN: CPT | Performed by: NURSE PRACTITIONER

## 2020-10-22 NOTE — PATIENT INSTRUCTIONS
Hypothyroidism    Hypothyroidism is when the thyroid gland does not make enough of certain hormones (it is underactive). The thyroid gland is a small gland located in the lower front part of the neck, just in front of the windpipe (trachea). This gland makes hormones that help control how the body uses food for energy (metabolism) as well as how the heart and brain function. These hormones also play a role in keeping your bones strong. When the thyroid is underactive, it produces too little of the hormones thyroxine (T4) and triiodothyronine (T3).  What are the causes?  This condition may be caused by:  · Hashimoto's disease. This is a disease in which the body's disease-fighting system (immune system) attacks the thyroid gland. This is the most common cause.  · Viral infections.  · Pregnancy.  · Certain medicines.  · Birth defects.  · Past radiation treatments to the head or neck for cancer.  · Past treatment with radioactive iodine.  · Past exposure to radiation in the environment.  · Past surgical removal of part or all of the thyroid.  · Problems with a gland in the center of the brain (pituitary gland).  · Lack of enough iodine in the diet.  What increases the risk?  You are more likely to develop this condition if:  · You are female.  · You have a family history of thyroid conditions.  · You use a medicine called lithium.  · You take medicines that affect the immune system (immunosuppressants).  What are the signs or symptoms?  Symptoms of this condition include:  · Feeling as though you have no energy (lethargy).  · Not being able to tolerate cold.  · Weight gain that is not explained by a change in diet or exercise habits.  · Lack of appetite.  · Dry skin.  · Coarse hair.  · Menstrual irregularity.  · Slowing of thought processes.  · Constipation.  · Sadness or depression.  How is this diagnosed?  This condition may be diagnosed based on:  · Your symptoms, your medical history, and a physical exam.  · Blood  tests.  You may also have imaging tests, such as an ultrasound or MRI.  How is this treated?  This condition is treated with medicine that replaces the thyroid hormones that your body does not make. After you begin treatment, it may take several weeks for symptoms to go away.  Follow these instructions at home:  · Take over-the-counter and prescription medicines only as told by your health care provider.  · If you start taking any new medicines, tell your health care provider.  · Keep all follow-up visits as told by your health care provider. This is important.  ? As your condition improves, your dosage of thyroid hormone medicine may change.  ? You will need to have blood tests regularly so that your health care provider can monitor your condition.  Contact a health care provider if:  · Your symptoms do not get better with treatment.  · You are taking thyroid replacement medicine and you:  ? Sweat a lot.  ? Have tremors.  ? Feel anxious.  ? Lose weight rapidly.  ? Cannot tolerate heat.  ? Have emotional swings.  ? Have diarrhea.  ? Feel weak.  Get help right away if you have:  · Chest pain.  · An irregular heartbeat.  · A rapid heartbeat.  · Difficulty breathing.  Summary  · Hypothyroidism is when the thyroid gland does not make enough of certain hormones (it is underactive).  · When the thyroid is underactive, it produces too little of the hormones thyroxine (T4) and triiodothyronine (T3).  · The most common cause is Hashimoto's disease, a disease in which the body's disease-fighting system (immune system) attacks the thyroid gland. The condition can also be caused by viral infections, medicine, pregnancy, or past radiation treatment to the head or neck.  · Symptoms may include weight gain, dry skin, constipation, feeling as though you do not have energy, and not being able to tolerate cold.  · This condition is treated with medicine to replace the thyroid hormones that your body does not make.  This information  is not intended to replace advice given to you by your health care provider. Make sure you discuss any questions you have with your health care provider.  Document Released: 12/18/2006 Document Revised: 11/30/2018 Document Reviewed: 11/28/2018  Elsevier Patient Education © 2020 [employer]  Dizziness  Dizziness is a common problem. It makes you feel unsteady or light-headed. You may feel like you are about to pass out (faint). Dizziness can lead to getting hurt if you stumble or fall. Dizziness can be caused by many things, including:  · Medicines.  · Not having enough water in your body (dehydration).  · Illness.  Follow these instructions at home:  Eating and drinking    · Drink enough fluid to keep your pee (urine) clear or pale yellow. This helps to keep you from getting dehydrated. Try to drink more clear fluids, such as water.  · Do not drink alcohol.  · Limit how much caffeine you drink or eat, if your doctor tells you to do that.  · Limit how much salt (sodium) you drink or eat, if your doctor tells you to do that.  Activity    · Avoid making quick movements.  ? When you stand up from sitting in a chair, steady yourself until you feel okay.  ? In the morning, first sit up on the side of the bed. When you feel okay, stand slowly while you hold onto something. Do this until you know that your balance is fine.  · If you need to  one place for a long time, move your legs often. Tighten and relax the muscles in your legs while you are standing.  · Do not drive or use heavy machinery if you feel dizzy.  · Avoid bending down if you feel dizzy. Place items in your home so you can reach them easily without leaning over.  Lifestyle  · Do not use any products that contain nicotine or tobacco, such as cigarettes and e-cigarettes. If you need help quitting, ask your doctor.  · Try to lower your stress level. You can do this by using methods such as yoga or meditation. Talk with your doctor if you need  help.  General instructions  · Watch your dizziness for any changes.  · Take over-the-counter and prescription medicines only as told by your doctor. Talk with your doctor if you think that you are dizzy because of a medicine that you are taking.  · Tell a friend or a family member that you are feeling dizzy. If he or she notices any changes in your behavior, have this person call your doctor.  · Keep all follow-up visits as told by your doctor. This is important.  Contact a doctor if:  · Your dizziness does not go away.  · Your dizziness or light-headedness gets worse.  · You feel sick to your stomach (nauseous).  · You have trouble hearing.  · You have new symptoms.  · You are unsteady on your feet.  · You feel like the room is spinning.  Get help right away if:  · You throw up (vomit) or have watery poop (diarrhea), and you cannot eat or drink anything.  · You have trouble:  ? Talking.  ? Walking.  ? Swallowing.  ? Using your arms, hands, or legs.  · You feel generally weak.  · You are not thinking clearly, or you have trouble forming sentences. A friend or family member may notice this.  · You have:  ? Chest pain.  ? Pain in your belly (abdomen).  ? Shortness of breath.  ? Sweating.  · Your vision changes.  · You are bleeding.  · You have a very bad headache.  · You have neck pain or a stiff neck.  · You have a fever.  These symptoms may be an emergency. Do not wait to see if the symptoms will go away. Get medical help right away. Call your local emergency services (911 in the [place] Do not drive yourself to the hospital.  Summary  · Dizziness makes you feel unsteady or light-headed. You may feel like you are about to pass out (faint).  · Drink enough fluid to keep your pee (urine) clear or pale yellow. Do not drink alcohol.  · Avoid making quick movements if you feel dizzy.  · Watch your dizziness for any changes.  This information is not intended to replace advice given to you by your health care provider.  Make sure you discuss any questions you have with your health care provider.  Document Released: 12/06/2012 Document Revised: 12/21/2018 Document Reviewed: 01/04/2018  Elsevier Patient Education © 2020 [employer]  Allergic Rhinitis, Adult  Allergic rhinitis is a reaction to allergens in the air. Allergens are tiny specks (particles) in the air that cause your body to have an allergic reaction. This condition cannot be passed from person to person (is not contagious). Allergic rhinitis cannot be cured, but it can be controlled.  There are two types of allergic rhinitis:  · Seasonal. This type is also called hay fever. It happens only during certain times of the year.  · Perennial. This type can happen at any time of the year.  What are the causes?  This condition may be caused by:  · Pollen from grasses, trees, and weeds.  · House dust mites.  · Pet dander.  · Mold.  What are the signs or symptoms?  Symptoms of this condition include:  · Sneezing.  · Runny or stuffy nose (nasal congestion).  · A lot of mucus in the back of the throat (postnasal drip).  · Itchy nose.  · Tearing of the eyes.  · Trouble sleeping.  · Being sleepy during day.  How is this treated?  There is no cure for this condition. You should avoid things that trigger your symptoms (allergens). Treatment can help to relieve symptoms. This may include:  · Medicines that block allergy symptoms, such as antihistamines. These may be given as a shot, nasal spray, or pill.  · Shots that are given until your body becomes less sensitive to the allergen (desensitization).  · Stronger medicines, if all other treatments have not worked.  Follow these instructions at home:  Avoiding allergens    · Find out what you are allergic to. Common allergens include smoke, dust, and pollen.  · Avoid them if you can. These are some of the things that you can do to avoid allergens:  ? Replace carpet with wood, tile, or vinyl juan. Carpet can trap dander and  dust.  ? Clean any mold found in the home.  ? Do not smoke. Do not allow smoking in your home.  ? Change your heating and air conditioning filter at least once a month.  ? During allergy season:  § Keep windows closed as much as you can. If possible, use air conditioning when there is a lot of pollen in the air.  § Use a special filter for allergies with your furnace and air conditioner.  § Plan outdoor activities when pollen counts are lowest. This is usually during the early morning or evening hours.  § If you do go outdoors when pollen count is high, wear a special mask for people with allergies.  § When you come indoors, take a shower and change your clothes before sitting on furniture or bedding.  General instructions  · Do not use fans in your home.  · Do not hang clothes outside to dry.  · Wear sunglasses to keep pollen out of your eyes.  · Wash your hands right away after you touch household pets.  · Take over-the-counter and prescription medicines only as told by your doctor.  · Keep all follow-up visits as told by your doctor. This is important.  Contact a doctor if:  · You have a fever.  · You have a cough that does not go away (is persistent).  · You start to make whistling sounds when you breathe (wheeze).  · Your symptoms do not get better with treatment.  · You have thick fluid coming from your nose.  · You start to have nosebleeds.  Get help right away if:  · Your tongue or your lips are swollen.  · You have trouble breathing.  · You feel dizzy or you feel like you are going to pass out (faint).  · You have cold sweats.  Summary  · Allergic rhinitis is a reaction to allergens in the air.  · This condition may be caused by allergens. These include pollen, dust mites, pet dander, and mold.  · Symptoms include a runny, itchy nose, sneezing, or tearing eyes. You may also have trouble sleeping or feel sleepy during the day.  · Treatment includes taking medicines and avoiding allergens. You may also get  shots or take stronger medicines.  · Get help if you have a fever or a cough that does not stop. Get help right away if you are short of breath.  This information is not intended to replace advice given to you by your health care provider. Make sure you discuss any questions you have with your health care provider.  Document Released: 04/18/2012 Document Revised: 04/07/2020 Document Reviewed: 07/09/2019  Elsevier Patient Education © 2020 [employer] Inc[employer]

## 2020-10-22 NOTE — PROGRESS NOTES
Subjective   Zabrina Contreras is a 70 y.o. female.   Chief Complaint   Patient presents with   • Hypothyroidism   • Follow-up     Gastro problems       Mrs. Contreras is a pleasant 69 yo female who present to the office for follow-up after adjusting her Synthroid and to discuss her abdominal pain, diarrhea and constipation complaints.  Patient reports that over the last couple of months her GI complaints have improved.  She is taking the Bentyl once a day and Levsin 2 times a day with moderate improvement in her symptoms.  She still reports intermittent diarrhea but denies black, tarry stools or raine blood.  Patient denies painful bowel movements and reports that she is using probiotics and watching her diet, eliminating products with lactose and high fat foods.    Adjusted her thyroid medicine from 100 to 112 mcg 3 months earlier.  Patient reports since the switch she has not had intermittent palpitations and has been feeling a lot better.  She is here today to repeat lab work to check to make sure her Synthroid dose is appropriate at this time.  Patient denies other complaints today.    Hypothyroidism  This is a chronic problem. The current episode started more than 1 year ago. The problem has been resolved. Associated symptoms include a change in bowel habit, congestion, fatigue, myalgias and weakness. Pertinent negatives include no abdominal pain, arthralgias, chest pain, coughing, fever, joint swelling, nausea, neck pain, rash, swollen glands or vomiting. Associated symptoms comments: Palpitations. The symptoms are aggravated by stress and eating. Treatments tried: Change medicine and eliminated caffeine. The treatment provided moderate relief.        The following portions of the patient's history were reviewed and updated as appropriate: allergies, current medications, past family history, past medical history, past social history, past surgical history and problem list.    Review of Systems   Constitutional: Positive  for fatigue. Negative for activity change, appetite change, fever, unexpected weight gain and unexpected weight loss.        Resolved and Synthroid changed   HENT: Positive for congestion and rhinorrhea. Negative for swollen glands, trouble swallowing and voice change.    Eyes: Negative for blurred vision and visual disturbance.   Respiratory: Negative for cough and shortness of breath.    Cardiovascular: Negative for chest pain, palpitations and leg swelling.   Gastrointestinal: Positive for change in bowel habit, constipation and diarrhea. Negative for abdominal pain, nausea, vomiting and indigestion.        Has improved moderately since last follow-up   Endocrine: Negative for cold intolerance, heat intolerance, polydipsia and polyphagia.   Genitourinary: Negative for dysuria and frequency.   Musculoskeletal: Positive for myalgias. Negative for arthralgias, back pain, joint swelling and neck pain.        Resolved and Synthroid changed   Skin: Negative for color change, rash and skin lesions.   Allergic/Immunologic: Positive for environmental allergies.   Neurological: Positive for weakness. Negative for dizziness, headache, memory problem and confusion.        Resolved and Synthroid changed   Hematological: Does not bruise/bleed easily.   Psychiatric/Behavioral: Negative for agitation, hallucinations and suicidal ideas. The patient is not nervous/anxious.        Objective   Past Medical History:   Diagnosis Date   • Chest pain 1/6/2017   • Disease of thyroid gland    • Hyperlipidemia    • Hypocholesteremia 1/6/2017   • Hypothyroidism 1/6/2017   • Microcytic anemia 1/6/2017   • Otalgia 1/6/2017   • Palpitations 1/6/2017      Past Surgical History:   Procedure Laterality Date   • APPENDECTOMY     • CHOLECYSTECTOMY     • HYSTERECTOMY     • TONSILLECTOMY          Current Outpatient Medications:   •  dicyclomine (BENTYL) 10 MG capsule, Take 1 capsule by mouth 3 (Three) Times a Day. (Patient taking differently: Take 10  mg by mouth Daily.), Disp: 270 capsule, Rfl: 3  •  estradiol (ESTRACE) 1 MG tablet, Take 1 tablet by mouth Daily., Disp: 90 tablet, Rfl: 3  •  hyoscyamine (LEVSIN) 0.125 MG SL tablet, Place 0.125 mg under the tongue 2 (Two) Times a Day As Needed., Disp: , Rfl:   •  levothyroxine (SYNTHROID, LEVOTHROID) 112 MCG tablet, Take 1 tablet by mouth Daily., Disp: 90 tablet, Rfl: 0     Vitals:    10/22/20 0802   BP: 124/86   Pulse: 84   Temp: 97.1 °F (36.2 °C)   SpO2: 96%         10/22/20  0802   Weight: 87.1 kg (192 lb)     Patient's Body mass index is 32.96 kg/m². BMI is above normal parameters. Recommendations include: educational material, exercise counseling and nutrition counseling.      Physical Exam  Vitals signs and nursing note reviewed.   Constitutional:       Appearance: Normal appearance. She is well-developed.   HENT:      Head: Normocephalic and atraumatic.      Right Ear: Hearing, ear canal and external ear normal. Tympanic membrane is bulging.      Left Ear: Hearing, tympanic membrane, ear canal and external ear normal. Tympanic membrane is not bulging.      Nose: Congestion present.      Mouth/Throat:      Lips: Pink.      Mouth: Mucous membranes are moist.      Pharynx: Uvula midline.   Eyes:      Conjunctiva/sclera: Conjunctivae normal.      Pupils: Pupils are equal, round, and reactive to light.   Neck:      Musculoskeletal: Normal range of motion and neck supple.      Thyroid: No thyromegaly.   Cardiovascular:      Rate and Rhythm: Normal rate and regular rhythm.      Pulses: Normal pulses.      Heart sounds: Normal heart sounds.   Pulmonary:      Effort: Pulmonary effort is normal.      Breath sounds: Normal breath sounds.   Abdominal:      General: Abdomen is protuberant. Bowel sounds are normal.      Palpations: Abdomen is soft.      Tenderness: There is no abdominal tenderness.   Musculoskeletal:      Right lower leg: No edema.      Left lower leg: No edema.   Lymphadenopathy:      Cervical: No  cervical adenopathy.   Skin:     General: Skin is warm and dry.      Capillary Refill: Capillary refill takes less than 2 seconds.   Neurological:      General: No focal deficit present.      Mental Status: She is alert and oriented to person, place, and time.   Psychiatric:         Attention and Perception: Attention normal.         Mood and Affect: Mood normal.         Behavior: Behavior normal. Behavior is cooperative.         Thought Content: Thought content normal.         Assessment/Plan   Diagnoses and all orders for this visit:    1. Bilateral lower abdominal cramping (Primary)    2. Hypothyroidism, unspecified type  -     TSH  -     T4, free    3. Non-recurrent subacute allergic otitis media of right ear        Lower abdominal cramping has improved since her last follow-up and will recheck her thyroid function today since medication adjustment.  Patient will start using nasal spray and allergy medicine daily to help with allergic otitis media of the right ear patient will call the office if pain in her ear and pressure worsens will consider giving her oral antibiotic.         Answers for HPI/ROS submitted by the patient on 10/20/2020   What is the primary reason for your visit?: Other  Please describe your symptoms.: Follow up for gastro problems and labs for thryroid medication  Have you had these symptoms before?: Yes  How long have you been having these symptoms?: Greater than 2 weeks  Please list any medications you are currently taking for this condition.: hyoscyamine and levothyroxine 112  Please describe any probable cause for these symptoms. : ibsd

## 2020-10-23 LAB
T4 FREE SERPL-MCNC: 1.66 NG/DL (ref 0.93–1.7)
TSH SERPL DL<=0.005 MIU/L-ACNC: 0.82 UIU/ML (ref 0.27–4.2)

## 2021-01-07 DIAGNOSIS — E03.9 HYPOTHYROIDISM, UNSPECIFIED TYPE: ICD-10-CM

## 2021-01-08 RX ORDER — LEVOTHYROXINE SODIUM 112 UG/1
TABLET ORAL
Qty: 90 TABLET | Refills: 1 | Status: SHIPPED | OUTPATIENT
Start: 2021-01-08 | End: 2021-01-12

## 2021-01-11 ENCOUNTER — TELEPHONE (OUTPATIENT)
Dept: INTERNAL MEDICINE | Facility: CLINIC | Age: 71
End: 2021-01-11

## 2021-01-11 NOTE — TELEPHONE ENCOUNTER
PATIENT CALLED REGARDING MEDICATION THAT WAS CALLED IN    Euthyrox 112 MCG tablet     PATIENT STATES SHE WAS SUPPOSE TO BE GETTING THE .1MG BUT INSTEAD SHE HAD GOTTEN .112MG    GOOD CALL BACK   [phone]      PHARMACY  [address] [phone] [facility] [phone] FX

## 2021-01-12 DIAGNOSIS — E03.9 HYPOTHYROIDISM, UNSPECIFIED TYPE: ICD-10-CM

## 2021-01-12 DIAGNOSIS — K58.9 IRRITABLE BOWEL SYNDROME WITHOUT DIARRHEA: Primary | ICD-10-CM

## 2021-01-12 RX ORDER — LEVOTHYROXINE SODIUM 0.1 MG/1
100 TABLET ORAL DAILY
Qty: 30 TABLET | Refills: 5 | Status: SHIPPED | OUTPATIENT
Start: 2021-01-12 | End: 2021-01-13 | Stop reason: SDUPTHER

## 2021-01-13 ENCOUNTER — TELEPHONE (OUTPATIENT)
Dept: INTERNAL MEDICINE | Facility: CLINIC | Age: 71
End: 2021-01-13

## 2021-01-13 DIAGNOSIS — E03.9 HYPOTHYROIDISM, UNSPECIFIED TYPE: ICD-10-CM

## 2021-01-13 RX ORDER — LEVOTHYROXINE SODIUM 0.1 MG/1
100 TABLET ORAL DAILY
Qty: 30 TABLET | Refills: 5 | Status: SHIPPED | OUTPATIENT
Start: 2021-01-13 | End: 2021-06-07

## 2021-01-13 NOTE — TELEPHONE ENCOUNTER
Appears to have been sent to mail order yesterday, but only for a 30 day supply.  Please look into this and make sure where it was supposed to go and check the quantity.

## 2021-01-13 NOTE — TELEPHONE ENCOUNTER
PATIENT IS CALLING TO UPDATE THAT SHE HAS YET TO RECEIVE UPDATED SCRIPT AT PHARMACY.    levothyroxine (Euthyrox) 100 MCG tablet

## 2021-01-14 ENCOUNTER — OFFICE VISIT (OUTPATIENT)
Dept: INTERNAL MEDICINE | Facility: CLINIC | Age: 71
End: 2021-01-14

## 2021-01-14 VITALS
HEIGHT: 64 IN | HEART RATE: 77 BPM | BODY MASS INDEX: 32.96 KG/M2 | OXYGEN SATURATION: 99 % | TEMPERATURE: 96.9 F | DIASTOLIC BLOOD PRESSURE: 82 MMHG | SYSTOLIC BLOOD PRESSURE: 132 MMHG

## 2021-01-14 DIAGNOSIS — H65.04 RECURRENT ACUTE SEROUS OTITIS MEDIA OF RIGHT EAR: Primary | ICD-10-CM

## 2021-01-14 PROCEDURE — 99212 OFFICE O/P EST SF 10 MIN: CPT | Performed by: NURSE PRACTITIONER

## 2021-01-14 PROCEDURE — 96372 THER/PROPH/DIAG INJ SC/IM: CPT | Performed by: NURSE PRACTITIONER

## 2021-01-14 RX ORDER — TRIAMCINOLONE ACETONIDE 40 MG/ML
40 INJECTION, SUSPENSION INTRA-ARTICULAR; INTRAMUSCULAR ONCE
Status: COMPLETED | OUTPATIENT
Start: 2021-01-14 | End: 2021-01-14

## 2021-01-14 RX ORDER — AZITHROMYCIN 250 MG/1
TABLET, FILM COATED ORAL
Qty: 6 TABLET | Refills: 0 | Status: SHIPPED | OUTPATIENT
Start: 2021-01-14 | End: 2021-02-22

## 2021-01-14 RX ADMIN — TRIAMCINOLONE ACETONIDE 40 MG: 40 INJECTION, SUSPENSION INTRA-ARTICULAR; INTRAMUSCULAR at 09:04

## 2021-01-14 NOTE — PROGRESS NOTES
"Chief Complaint  Earache (Right. Sinus drainage,throat discomfort, sinus pressure/headache.) and Food Intolerance (gluten)    Subjective          Zabrina Contreras presents to [facility] for   Zabrina Contreras presents acutely related to right ear pain and draining. She reports off and on issues with sinuses \"for years\", but worsening right ear pain since [place]kel. Patient has tried OTC treatments without relief, allergy medicines, ear drops, Mucinex, and cold medicine. Minimal relief with Rukhsana potty nasal saline rinses nightly. She reports moderate discomfort in right ear and drainage over the last couple of days. She reports that her ear canal is itchy and painful as well. She denies fever, chills, change in smell or taste and denies contact with COVID positive persons.       Objective   Vital Signs:   /82 (BP Location: Left arm)   Pulse 77   Temp 96.9 °F (36.1 °C)   Ht 162.6 cm (64\")   SpO2 99%   BMI 32.96 kg/m²     Physical Exam  Vitals signs and nursing note reviewed.   Constitutional:       Appearance: She is well-developed.   HENT:      Head: Normocephalic and atraumatic.      Right Ear: Hearing, ear canal and external ear normal. No decreased hearing noted. Drainage, swelling and tenderness present. Tympanic membrane is bulging.      Left Ear: Hearing, tympanic membrane, ear canal and external ear normal.      Nose: Nose normal.      Mouth/Throat:      Lips: Pink.      Mouth: Mucous membranes are moist.      Pharynx: Oropharynx is clear. Uvula midline.   Eyes:      General: Lids are normal. Lids are everted, no foreign bodies appreciated. Vision grossly intact.      Extraocular Movements: Extraocular movements intact.      Conjunctiva/sclera: Conjunctivae normal.      Pupils: Pupils are equal, round, and reactive to light.   Neck:      Musculoskeletal: Normal range of motion and neck supple.      Thyroid: No thyromegaly.   Cardiovascular:      Rate and Rhythm: Normal rate and " regular rhythm.      Pulses: Normal pulses.      Heart sounds: Normal heart sounds.   Pulmonary:      Effort: Pulmonary effort is normal. No tachypnea, accessory muscle usage or respiratory distress.      Breath sounds: Normal breath sounds. No decreased breath sounds.   Abdominal:      General: Bowel sounds are normal.      Palpations: Abdomen is soft.   Musculoskeletal:      Right lower leg: No edema.      Left lower leg: No edema.   Lymphadenopathy:      Cervical: No cervical adenopathy.   Skin:     General: Skin is warm and dry.   Neurological:      Mental Status: She is alert and oriented to person, place, and time.   Psychiatric:         Behavior: Behavior normal.         Thought Content: Thought content normal.               Assessment and Plan    Problem List Items Addressed This Visit     None      Visit Diagnoses     Recurrent acute serous otitis media of right ear    -  Primary    Relevant Medications    azithromycin (Zithromax Z-Frank) 250 MG tablet        I spent 20 minutes caring for Zabrina on this date of service. This time includes time spent by me in the following activities:preparing for the visit, obtaining and/or reviewing a separately obtained history, performing a medically appropriate examination and/or evaluation , counseling and educating the patient/family/caregiver, ordering medications, tests, or procedures and documenting information in the medical record  Follow Up   Return in about 4 weeks (around 2/11/2021) for Recheck .  Patient was given instructions and counseling regarding her condition or for health maintenance advice. Please see specific information pulled into the AVS if appropriate.

## 2021-02-11 ENCOUNTER — NURSE TRIAGE (OUTPATIENT)
Dept: CALL CENTER | Facility: HOSPITAL | Age: 71
End: 2021-02-11

## 2021-02-11 NOTE — TELEPHONE ENCOUNTER
"Caller needs to cancel 8am apptment 02/12/21 due to weather    Reason for Disposition  • Health Information question, no triage required and triager able to answer question    Additional Information  • Negative: [1] Caller is not with the adult (patient) AND [2] reporting urgent symptoms  • Negative: Lab result questions  • Negative: Medication questions  • Negative: Caller can't be reached by phone  • Negative: Caller has already spoken to PCP or another triager  • Negative: RN needs further essential information from caller in order to complete triage  • Negative: Requesting regular office appointment  • Negative: [1] Caller requesting NON-URGENT health information AND [2] PCP's office is the best resource    Answer Assessment - Initial Assessment Questions  1. REASON FOR CALL or QUESTION: \"What is your reason for calling today?\" or \"How can I best help you?\" or \"What question do you have that I can help answer?\"      Caller needs to cancel appointment for 02/12/21 due to weather    Protocols used: INFORMATION ONLY CALL - NO TRIAGE-ADULT-      "

## 2021-02-22 ENCOUNTER — OFFICE VISIT (OUTPATIENT)
Dept: INTERNAL MEDICINE | Facility: CLINIC | Age: 71
End: 2021-02-22

## 2021-02-22 VITALS
HEIGHT: 64 IN | WEIGHT: 187 LBS | OXYGEN SATURATION: 98 % | TEMPERATURE: 96.9 F | HEART RATE: 83 BPM | SYSTOLIC BLOOD PRESSURE: 118 MMHG | DIASTOLIC BLOOD PRESSURE: 66 MMHG | BODY MASS INDEX: 31.92 KG/M2

## 2021-02-22 DIAGNOSIS — E78.00 HYPERCHOLESTEROLEMIA: ICD-10-CM

## 2021-02-22 DIAGNOSIS — H93.8X3 SENSATION OF FULLNESS IN BOTH EARS: ICD-10-CM

## 2021-02-22 DIAGNOSIS — Z12.31 BREAST CANCER SCREENING BY MAMMOGRAM: ICD-10-CM

## 2021-02-22 DIAGNOSIS — E03.9 HYPOTHYROIDISM, UNSPECIFIED TYPE: ICD-10-CM

## 2021-02-22 DIAGNOSIS — Z79.899 ENCOUNTER FOR LONG-TERM (CURRENT) USE OF OTHER MEDICATIONS: Primary | ICD-10-CM

## 2021-02-22 DIAGNOSIS — Z23 NEED FOR PNEUMOCOCCAL VACCINATION: ICD-10-CM

## 2021-02-22 DIAGNOSIS — Z11.59 NEED FOR HEPATITIS C SCREENING TEST: ICD-10-CM

## 2021-02-22 DIAGNOSIS — Z78.0 POST-MENOPAUSE: ICD-10-CM

## 2021-02-22 DIAGNOSIS — B35.1 TOENAIL FUNGUS: ICD-10-CM

## 2021-02-22 PROCEDURE — 90670 PCV13 VACCINE IM: CPT | Performed by: NURSE PRACTITIONER

## 2021-02-22 PROCEDURE — 99397 PER PM REEVAL EST PAT 65+ YR: CPT | Performed by: NURSE PRACTITIONER

## 2021-02-22 PROCEDURE — G0009 ADMIN PNEUMOCOCCAL VACCINE: HCPCS | Performed by: NURSE PRACTITIONER

## 2021-02-22 RX ORDER — ESTRADIOL 1 MG/1
1 TABLET ORAL DAILY
Qty: 90 TABLET | Refills: 3 | Status: SHIPPED | OUTPATIENT
Start: 2021-02-22 | End: 2022-07-05 | Stop reason: SDUPTHER

## 2021-02-22 RX ORDER — TERBINAFINE HYDROCHLORIDE 250 MG/1
250 TABLET ORAL DAILY
Qty: 30 TABLET | Refills: 0 | Status: SHIPPED | OUTPATIENT
Start: 2021-02-22 | End: 2021-10-28

## 2021-02-22 RX ORDER — METHYLPREDNISOLONE 4 MG/1
TABLET ORAL
Qty: 1 TABLET | Refills: 0 | Status: SHIPPED | OUTPATIENT
Start: 2021-02-22 | End: 2021-10-28

## 2021-02-22 NOTE — PROGRESS NOTES
"Chief Complaint  Annual Exam and Ear Problem (Possible fluid)    Subjective          Zabrina Contreras presents to [facility]  Mrs. Contreras is a pleasant 69 yo female who presents for annual exam. She does report chronic issue with right big toe nail fungus. She has been using topical treatments for >6 months without moderate improvement. She does not want to see podiatrist and is requesting oral therapy temporarily. She also reports intermittent, persistent ear pain and fullness. Patient was seen earlier in January for otitis media and completed azithromycin with resolution of her symptoms. However, she reports her allergy symptoms have worsened related to seasonal allergies and notices itching and pressure in right ear again. She also reports sore throat, but denies fever, chills, shortness of breath, cough, or change in taste or smell. She reports that her irritable bowel symptoms have significantly improved with diet adjustment and avoidance of known food triggers. She reports food triggers at this time includes gluten, fried/fatty foods, and chocolate. She reports only taking bentyl as needed and reports \" last time I used bentyl for spasms was 2 or 3 weeks ago\".       Review of Systems   Constitutional: Negative for activity change, appetite change, fatigue and fever.   HENT: Positive for congestion and ear pain. Negative for ear discharge, facial swelling, postnasal drip, rhinorrhea, sinus pressure, sneezing, tinnitus and trouble swallowing.    Eyes: Negative for discharge and visual disturbance.   Respiratory: Negative for chest tightness, shortness of breath, wheezing and stridor.    Cardiovascular: Negative for chest pain, palpitations and leg swelling.   Gastrointestinal: Negative for abdominal distention, abdominal pain, constipation, diarrhea and nausea.   Endocrine: Negative for cold intolerance and heat intolerance.   Genitourinary: Negative for difficulty urinating, flank pain, " "frequency, hematuria and urgency.   Musculoskeletal: Negative for arthralgias, joint swelling, myalgias and neck pain.   Skin: Negative for color change and rash.   Allergic/Immunologic: Negative for environmental allergies.   Neurological: Negative for dizziness, tremors, weakness and confusion.   Hematological: Negative for adenopathy.   Psychiatric/Behavioral: Negative for decreased concentration and sleep disturbance. The patient is not nervous/anxious.      Objective   Vital Signs:   /66 (BP Location: Left arm)   Pulse 83   Temp 96.9 °F (36.1 °C)   Ht 162.6 cm (64\")   Wt 84.8 kg (187 lb)   SpO2 98%   BMI 32.10 kg/m²     Physical Exam  Vitals signs and nursing note reviewed.   Constitutional:       Appearance: She is well-developed.   HENT:      Head: Normocephalic and atraumatic.      Right Ear: Hearing, ear canal and external ear normal. Tympanic membrane is bulging. Tympanic membrane is not erythematous.      Left Ear: Hearing, ear canal and external ear normal. Tympanic membrane is bulging. Tympanic membrane is not erythematous.      Nose: Nose normal.      Mouth/Throat:      Lips: Pink.      Mouth: Mucous membranes are moist.      Tongue: No lesions.      Pharynx: Oropharynx is clear. Uvula midline.   Eyes:      General: Lids are normal. Lids are everted, no foreign bodies appreciated.      Extraocular Movements: Extraocular movements intact.      Conjunctiva/sclera: Conjunctivae normal.      Pupils: Pupils are equal, round, and reactive to light.   Neck:      Musculoskeletal: Normal range of motion and neck supple.      Thyroid: No thyroid mass or thyromegaly.      Vascular: No carotid bruit.   Cardiovascular:      Rate and Rhythm: Normal rate and regular rhythm.      Pulses: Normal pulses.      Heart sounds: Normal heart sounds.   Pulmonary:      Effort: Pulmonary effort is normal.      Breath sounds: Normal breath sounds.   Abdominal:      General: Abdomen is protuberant. Bowel sounds are " normal.      Palpations: Abdomen is soft.      Tenderness: There is no abdominal tenderness.   Musculoskeletal: Normal range of motion.      Right lower leg: No edema.      Left lower leg: No edema.   Lymphadenopathy:      Cervical: No cervical adenopathy.   Skin:     General: Skin is warm and dry.      Capillary Refill: Capillary refill takes less than 2 seconds.   Neurological:      General: No focal deficit present.      Mental Status: She is alert and oriented to person, place, and time.      Deep Tendon Reflexes: Reflexes are normal and symmetric.   Psychiatric:         Attention and Perception: Attention normal.         Mood and Affect: Mood normal.         Behavior: Behavior normal.         Thought Content: Thought content normal.               Assessment and Plan    Diagnoses and all orders for this visit:    1. Encounter for long-term (current) use of other medications (Primary)  -     Comprehensive Metabolic Panel  -     CBC & Differential  -     Uric Acid  -     Urinalysis With Microscopic If Indicated (No Culture) - Urine, Clean Catch    2. Hypercholesterolemia  -     Lipid Panel    3. Hypothyroidism, unspecified type  -     TSH    4. Need for hepatitis C screening test  -     Hepatitis C Antibody    5. Post-menopause  -     estradiol (ESTRACE) 1 MG tablet; Take 1 tablet by mouth Daily.  Dispense: 90 tablet; Refill: 3  -     DEXA Bone Density Axial; Future    6. Sensation of fullness in both ears  -     methylPREDNISolone (MEDROL) 4 MG dose pack; Take as directed on package instructions.  Dispense: 1 tablet; Refill: 0    7. Toenail fungus  -     terbinafine (LamISIL) 250 MG tablet; Take 1 tablet by mouth Daily.  Dispense: 30 tablet; Refill: 0    8. Breast cancer screening by mammogram  -     Mammo Screening Digital Tomosynthesis Bilateral With CAD; Future      I spent 30 minutes caring for Zabrina on this date of service. This time includes time spent by me in the following activities:preparing for the  visit, reviewing tests, obtaining and/or reviewing a separately obtained history, performing a medically appropriate examination and/or evaluation , counseling and educating the patient/family/caregiver, ordering medications, tests, or procedures, referring and communicating with other health care professionals , documenting information in the medical record, independently interpreting results and communicating that information with the patient/family/caregiver and care coordination     Follow Up     Return in about 8 months (around 10/22/2021) for Medicare Wellness. Will complete steroid pack today related to allergic otitis symptoms. Advised patient to return without improvement in symptoms. I advised her to continue using Claritin and start using her saline nasal spray 3 times a day.     Nail fungus will be treated with oral antifungal therapy. Discussed side effect with patient and reported understanding and would still like to proceed with oral therapy. Patient had attempted topical therapy for many months without relief. I advised if no improvement would place referral for Podiatrist.     Patient was given instructions and counseling regarding her condition or for health maintenance advice. Please see specific information pulled into the AVS if appropriate.

## 2021-02-23 LAB
ALBUMIN SERPL-MCNC: 3.8 G/DL (ref 3.5–5.2)
ALBUMIN/GLOB SERPL: 1.3 G/DL
ALP SERPL-CCNC: 91 U/L (ref 39–117)
ALT SERPL-CCNC: 18 U/L (ref 1–33)
APPEARANCE UR: CLEAR
AST SERPL-CCNC: 27 U/L (ref 1–32)
BASOPHILS # BLD AUTO: 0.11 10*3/MM3 (ref 0–0.2)
BASOPHILS NFR BLD AUTO: 1 % (ref 0–1.5)
BILIRUB SERPL-MCNC: 0.5 MG/DL (ref 0–1.2)
BILIRUB UR QL STRIP: NEGATIVE
BUN SERPL-MCNC: 7 MG/DL (ref 8–23)
BUN/CREAT SERPL: 10.1 (ref 7–25)
CALCIUM SERPL-MCNC: 9.6 MG/DL (ref 8.6–10.5)
CHLORIDE SERPL-SCNC: 103 MMOL/L (ref 98–107)
CHOLEST SERPL-MCNC: 184 MG/DL (ref 0–200)
CO2 SERPL-SCNC: 28.3 MMOL/L (ref 22–29)
COLOR UR: YELLOW
CREAT SERPL-MCNC: 0.69 MG/DL (ref 0.57–1)
EOSINOPHIL # BLD AUTO: 0.25 10*3/MM3 (ref 0–0.4)
EOSINOPHIL NFR BLD AUTO: 2.3 % (ref 0.3–6.2)
ERYTHROCYTE [DISTWIDTH] IN BLOOD BY AUTOMATED COUNT: 13.4 % (ref 12.3–15.4)
GLOBULIN SER CALC-MCNC: 2.9 GM/DL
GLUCOSE SERPL-MCNC: 87 MG/DL (ref 65–99)
GLUCOSE UR QL: NEGATIVE
HCT VFR BLD AUTO: 43.7 % (ref 34–46.6)
HCV AB S/CO SERPL IA: <0.1 S/CO RATIO (ref 0–0.9)
HDLC SERPL-MCNC: 54 MG/DL (ref 40–60)
HGB BLD-MCNC: 13.7 G/DL (ref 12–15.9)
HGB UR QL STRIP: NEGATIVE
IMM GRANULOCYTES # BLD AUTO: 0.04 10*3/MM3 (ref 0–0.05)
IMM GRANULOCYTES NFR BLD AUTO: 0.4 % (ref 0–0.5)
KETONES UR QL STRIP: NEGATIVE
LDLC SERPL CALC-MCNC: 111 MG/DL (ref 0–100)
LEUKOCYTE ESTERASE UR QL STRIP: NEGATIVE
LYMPHOCYTES # BLD AUTO: 3.08 10*3/MM3 (ref 0.7–3.1)
LYMPHOCYTES NFR BLD AUTO: 27.7 % (ref 19.6–45.3)
MCH RBC QN AUTO: 25.5 PG (ref 26.6–33)
MCHC RBC AUTO-ENTMCNC: 31.4 G/DL (ref 31.5–35.7)
MCV RBC AUTO: 81.2 FL (ref 79–97)
MONOCYTES # BLD AUTO: 0.89 10*3/MM3 (ref 0.1–0.9)
MONOCYTES NFR BLD AUTO: 8 % (ref 5–12)
NEUTROPHILS # BLD AUTO: 6.73 10*3/MM3 (ref 1.7–7)
NEUTROPHILS NFR BLD AUTO: 60.6 % (ref 42.7–76)
NITRITE UR QL STRIP: NEGATIVE
NRBC BLD AUTO-RTO: 0 /100 WBC (ref 0–0.2)
PH UR STRIP: 6 [PH] (ref 5–8)
PLATELET # BLD AUTO: 449 10*3/MM3 (ref 140–450)
POTASSIUM SERPL-SCNC: 4.8 MMOL/L (ref 3.5–5.2)
PROT SERPL-MCNC: 6.7 G/DL (ref 6–8.5)
PROT UR QL STRIP: NEGATIVE
RBC # BLD AUTO: 5.38 10*6/MM3 (ref 3.77–5.28)
SODIUM SERPL-SCNC: 137 MMOL/L (ref 136–145)
SP GR UR: 1.02 (ref 1–1.03)
TRIGL SERPL-MCNC: 103 MG/DL (ref 0–150)
TSH SERPL DL<=0.005 MIU/L-ACNC: 0.85 UIU/ML (ref 0.27–4.2)
URATE SERPL-MCNC: 4.4 MG/DL (ref 2.4–5.7)
UROBILINOGEN UR STRIP-MCNC: NORMAL MG/DL
VLDLC SERPL CALC-MCNC: 19 MG/DL (ref 5–40)
WBC # BLD AUTO: 11.1 10*3/MM3 (ref 3.4–10.8)

## 2021-03-16 DIAGNOSIS — Z78.0 POST-MENOPAUSE: ICD-10-CM

## 2021-03-16 DIAGNOSIS — Z12.31 BREAST CANCER SCREENING BY MAMMOGRAM: ICD-10-CM

## 2021-03-19 ENCOUNTER — TELEPHONE (OUTPATIENT)
Dept: INTERNAL MEDICINE | Facility: CLINIC | Age: 71
End: 2021-03-19

## 2021-03-19 NOTE — TELEPHONE ENCOUNTER
Called patient regarding BMD. She stated she tries to have a lot of calcium/vitamin d in her diet. She is not interested in Fosamax right now. She said she will try to up her calcium/vitamin d in her diet.

## 2021-06-05 DIAGNOSIS — E03.9 HYPOTHYROIDISM, UNSPECIFIED TYPE: ICD-10-CM

## 2021-06-07 RX ORDER — LEVOTHYROXINE SODIUM 0.1 MG/1
TABLET ORAL
Qty: 90 TABLET | Refills: 3 | Status: SHIPPED | OUTPATIENT
Start: 2021-06-07 | End: 2021-11-01

## 2021-10-28 ENCOUNTER — OFFICE VISIT (OUTPATIENT)
Dept: INTERNAL MEDICINE | Facility: CLINIC | Age: 71
End: 2021-10-28

## 2021-10-28 VITALS
OXYGEN SATURATION: 98 % | WEIGHT: 168 LBS | DIASTOLIC BLOOD PRESSURE: 76 MMHG | SYSTOLIC BLOOD PRESSURE: 118 MMHG | TEMPERATURE: 97.3 F | HEART RATE: 62 BPM | HEIGHT: 64 IN | BODY MASS INDEX: 28.68 KG/M2

## 2021-10-28 DIAGNOSIS — E03.9 ACQUIRED HYPOTHYROIDISM: ICD-10-CM

## 2021-10-28 DIAGNOSIS — H61.22 IMPACTED CERUMEN OF LEFT EAR: ICD-10-CM

## 2021-10-28 DIAGNOSIS — R09.81 CONGESTION OF NASAL SINUS: ICD-10-CM

## 2021-10-28 DIAGNOSIS — K90.41 NON-CELIAC GLUTEN SENSITIVITY: ICD-10-CM

## 2021-10-28 DIAGNOSIS — E78.00 HYPERCHOLESTEROLEMIA: Primary | ICD-10-CM

## 2021-10-28 PROCEDURE — 69210 REMOVE IMPACTED EAR WAX UNI: CPT | Performed by: NURSE PRACTITIONER

## 2021-10-28 PROCEDURE — 99214 OFFICE O/P EST MOD 30 MIN: CPT | Performed by: NURSE PRACTITIONER

## 2021-10-28 NOTE — PROGRESS NOTES
Subjective   Zabrina Contreras is a 71 y.o. female.   Chief Complaint   Patient presents with   • Hyperlipidemia   • Hypothyroidism       Zabrina present to the office for 6 month follow up. Patient has lost significant amount of weight following WW2. She reports that her overall diarrhea and abdominal pain has improved by avoiding gluten and started taking probiotics. Patient reports that she is still intermittently having issues with diarrhea and light colored stools. However, reporting these episodes rarely. Patient has adjusted her diet to avoid high fat foods related to history of cholecystectomy.She states that she might want a referral down the road to confirm celiac, declines at this time.    Patient has chronic issues with sinus and nasal congestion. At last visit, I did give her a steroid pack with significant benefit while on the steroid, but symptoms immediately return 2-3 days after stopping steroid. Patient is using claritin with mild benefit. She does have Flonase, but rarely using. She has a Millie Pott, but is not using this on a regular basis. She denies nasal, sinus congestion or pain.    Hyperlipidemia  This is a chronic problem. The current episode started more than 1 year ago. The problem is controlled. Recent lipid tests were reviewed and are variable. Exacerbating diseases include hypothyroidism. Factors aggravating her hyperlipidemia include estrogen. Pertinent negatives include no chest pain or shortness of breath. Current antihyperlipidemic treatment includes diet change, statins and exercise. The current treatment provides moderate improvement of lipids. There are no compliance problems.  Risk factors for coronary artery disease include post-menopausal, hypertension and dyslipidemia.   Hypothyroidism  This is a chronic problem. The current episode started more than 1 year ago. The problem has been unchanged. Associated symptoms include congestion. Pertinent negatives include no abdominal pain,  arthralgias, chest pain, coughing, fatigue, fever, joint swelling, nausea, neck pain, rash, swollen glands, vomiting or weakness. Nothing aggravates the symptoms. Treatments tried: synthroid. The treatment provided moderate relief.        The following portions of the patient's history were reviewed and updated as appropriate: allergies, current medications, past family history, past medical history, past social history, past surgical history and problem list.    Review of Systems   Constitutional: Negative for activity change, appetite change, fatigue, fever, unexpected weight gain and unexpected weight loss.   HENT: Positive for congestion. Negative for swollen glands, trouble swallowing and voice change.    Eyes: Negative for blurred vision and visual disturbance.   Respiratory: Negative for cough and shortness of breath.    Cardiovascular: Negative for chest pain, palpitations and leg swelling.   Gastrointestinal: Negative for abdominal pain, constipation, diarrhea, nausea, vomiting and indigestion.   Endocrine: Negative for cold intolerance, heat intolerance, polydipsia and polyphagia.   Genitourinary: Negative for dysuria and frequency.   Musculoskeletal: Negative for arthralgias, back pain, joint swelling and neck pain.   Skin: Negative for color change, rash and skin lesions.   Neurological: Negative for dizziness, weakness, headache, memory problem and confusion.   Hematological: Does not bruise/bleed easily.   Psychiatric/Behavioral: Negative for agitation, hallucinations and suicidal ideas. The patient is not nervous/anxious.        Objective   Past Medical History:   Diagnosis Date   • Chest pain 1/6/2017   • Disease of thyroid gland    • Hyperlipidemia    • Hypocholesteremia 1/6/2017   • Hypothyroidism 1/6/2017   • Microcytic anemia 1/6/2017   • Otalgia 1/6/2017   • Palpitations 1/6/2017      Past Surgical History:   Procedure Laterality Date   • APPENDECTOMY     • CHOLECYSTECTOMY     • HYSTERECTOMY      • TONSILLECTOMY          Current Outpatient Medications:   •  dicyclomine (BENTYL) 10 MG capsule, Take 1 capsule by mouth 3 (Three) Times a Day. (Patient taking differently: Take 10 mg by mouth Daily.), Disp: 270 capsule, Rfl: 3  •  estradiol (ESTRACE) 1 MG tablet, Take 1 tablet by mouth Daily., Disp: 90 tablet, Rfl: 3  •  hyoscyamine (LEVSIN) 0.125 MG SL tablet, Place 0.125 mg under the tongue 2 (Two) Times a Day As Needed., Disp: , Rfl:   •  levothyroxine (SYNTHROID, LEVOTHROID) 100 MCG tablet, TAKE 1 TABLET EVERY DAY, Disp: 90 tablet, Rfl: 3  •  cycloSPORINE (RESTASIS OP), Apply  to eye(s) as directed by provider 2 (two) times a day., Disp: , Rfl:       Vitals:    10/28/21 0809   BP: 118/76   Pulse: 62   Temp: 97.3 °F (36.3 °C)   SpO2: 98%         10/28/21  0809   Weight: 76.2 kg (168 lb)       Body mass index is 28.84 kg/m².    Physical Exam  Vitals and nursing note reviewed.   Constitutional:       Appearance: Normal appearance. She is well-developed, well-groomed and overweight.   HENT:      Head: Normocephalic and atraumatic.      Right Ear: Hearing, tympanic membrane, ear canal and external ear normal. There is no impacted cerumen.      Left Ear: Hearing, tympanic membrane, ear canal and external ear normal. There is impacted cerumen.      Nose: Nose normal.      Mouth/Throat:      Lips: Pink.      Mouth: Mucous membranes are moist.      Pharynx: Oropharynx is clear. Uvula midline.   Eyes:      General: Lids are normal. Lids are everted, no foreign bodies appreciated. Vision grossly intact.      Extraocular Movements: Extraocular movements intact.      Conjunctiva/sclera: Conjunctivae normal.      Pupils: Pupils are equal, round, and reactive to light.   Neck:      Thyroid: No thyroid mass, thyromegaly or thyroid tenderness.      Vascular: No carotid bruit.   Cardiovascular:      Rate and Rhythm: Normal rate and regular rhythm.      Pulses: Normal pulses.      Heart sounds: Normal heart sounds.    Pulmonary:      Effort: Pulmonary effort is normal.      Breath sounds: Normal breath sounds.   Abdominal:      General: Bowel sounds are normal.      Palpations: Abdomen is soft.   Musculoskeletal:      Cervical back: Normal range of motion and neck supple.      Right lower leg: No edema.      Left lower leg: No edema.   Lymphadenopathy:      Head:      Right side of head: No submental, submandibular, tonsillar, preauricular, posterior auricular or occipital adenopathy.      Left side of head: No submental, submandibular, tonsillar, preauricular, posterior auricular or occipital adenopathy.      Cervical: No cervical adenopathy.   Skin:     General: Skin is warm and dry.      Capillary Refill: Capillary refill takes less than 2 seconds.   Neurological:      General: No focal deficit present.      Mental Status: She is alert and oriented to person, place, and time.      Deep Tendon Reflexes: Reflexes are normal and symmetric.   Psychiatric:         Attention and Perception: Attention normal.         Mood and Affect: Mood normal.         Speech: Speech normal.         Behavior: Behavior normal. Behavior is cooperative.         Thought Content: Thought content normal.       Ear Cerumen Removal    Date/Time: 10/28/2021 9:03 AM  Performed by: Cami Perry APRN  Authorized by: Cami Perry APRN     Anesthesia:  Local Anesthetic: none  Location details: left ear  Comments: Moderate sensitivity with procedure. Ear drum visualized without acute changes.   Procedure type: instrumentation, ear spatula   Sedation:  Patient sedated: no                Assessment/Plan   Diagnoses and all orders for this visit:    1. Hypercholesterolemia (Primary)    2. Acquired hypothyroidism  -     TSH Rfx On Abnormal To Free T4    3. Congestion of nasal sinus    4. Impacted cerumen of left ear  -     Cerumen Removal    5. Non-celiac gluten sensitivity      Her cholesterol was better controlled last visit and  diet/exercise has improved with significant weight loss. Will wait to recheck lipid at yearly labs.     Patient will have her thyroid rechecked. Due to significant weight loss need to make sure her synthroid dose does not need to be adjusted. If indicated will adjust accordingly.    Patient has chronic sinus and nasal congestion. Advised he to start using flonase daily. Can continue claritin, but if would like to use only one method, prefer flonase. Patient will continue Netti Pott.     Patient will continue to avoid gluten and take probiotics. Encouraged her to increase vegetable fibers, but can continue increased fiber from fruit and okay to continue following WW@ weight management. Need to increase strength training. Patient is planning on returning to the gym soon. Educated on social distancing and avoid COVID infection.

## 2021-10-29 LAB
T4 FREE SERPL-MCNC: 0.97 NG/DL (ref 0.93–1.7)
TSH SERPL DL<=0.005 MIU/L-ACNC: 18.3 UIU/ML (ref 0.27–4.2)

## 2021-11-01 ENCOUNTER — TELEPHONE (OUTPATIENT)
Dept: INTERNAL MEDICINE | Facility: CLINIC | Age: 71
End: 2021-11-01

## 2021-11-01 DIAGNOSIS — E03.9 ACQUIRED HYPOTHYROIDISM: Primary | ICD-10-CM

## 2021-11-01 RX ORDER — LEVOTHYROXINE SODIUM 112 UG/1
112 TABLET ORAL DAILY
Qty: 30 TABLET | Refills: 2 | Status: SHIPPED | OUTPATIENT
Start: 2021-11-01 | End: 2022-01-14

## 2021-11-01 NOTE — TELEPHONE ENCOUNTER
Patient states she is taking thyroid medication. I advised we would give her a call back once Cami is notified and knows dosage recommendation.

## 2021-11-01 NOTE — TELEPHONE ENCOUNTER
----- Message from LG Pozo sent at 10/29/2021  7:50 AM CDT -----  Determine if she is taking her thyroid medicine. If so we will have to increase synthroid

## 2022-01-14 DIAGNOSIS — E03.9 ACQUIRED HYPOTHYROIDISM: ICD-10-CM

## 2022-01-14 RX ORDER — LEVOTHYROXINE SODIUM 112 UG/1
TABLET ORAL
Qty: 90 TABLET | Refills: 0 | Status: SHIPPED | OUTPATIENT
Start: 2022-01-14 | End: 2022-03-30

## 2022-03-21 ENCOUNTER — OFFICE VISIT (OUTPATIENT)
Dept: INTERNAL MEDICINE | Facility: CLINIC | Age: 72
End: 2022-03-21

## 2022-03-21 VITALS
HEART RATE: 74 BPM | BODY MASS INDEX: 27.14 KG/M2 | WEIGHT: 159 LBS | HEIGHT: 64 IN | TEMPERATURE: 97.1 F | OXYGEN SATURATION: 99 % | SYSTOLIC BLOOD PRESSURE: 108 MMHG | DIASTOLIC BLOOD PRESSURE: 68 MMHG

## 2022-03-21 DIAGNOSIS — Z12.31 ENCOUNTER FOR SCREENING MAMMOGRAM FOR MALIGNANT NEOPLASM OF BREAST: ICD-10-CM

## 2022-03-21 DIAGNOSIS — E03.9 HYPOTHYROIDISM, UNSPECIFIED TYPE: ICD-10-CM

## 2022-03-21 DIAGNOSIS — D72.19 EOSINOPHILIC LEUKOCYTOSIS: ICD-10-CM

## 2022-03-21 DIAGNOSIS — D72.10 EOSINOPHILIA, UNSPECIFIED TYPE: ICD-10-CM

## 2022-03-21 DIAGNOSIS — Z00.00 MEDICARE ANNUAL WELLNESS VISIT, INITIAL: Primary | ICD-10-CM

## 2022-03-21 DIAGNOSIS — E78.00 HYPERCHOLESTEROLEMIA: ICD-10-CM

## 2022-03-21 DIAGNOSIS — R19.7 DIARRHEA OF PRESUMED INFECTIOUS ORIGIN: ICD-10-CM

## 2022-03-21 DIAGNOSIS — Z79.899 ENCOUNTER FOR LONG-TERM (CURRENT) USE OF OTHER MEDICATIONS: ICD-10-CM

## 2022-03-21 PROCEDURE — G0439 PPPS, SUBSEQ VISIT: HCPCS | Performed by: NURSE PRACTITIONER

## 2022-03-21 PROCEDURE — 1126F AMNT PAIN NOTED NONE PRSNT: CPT | Performed by: NURSE PRACTITIONER

## 2022-03-21 PROCEDURE — 1170F FXNL STATUS ASSESSED: CPT | Performed by: NURSE PRACTITIONER

## 2022-03-21 PROCEDURE — 96160 PT-FOCUSED HLTH RISK ASSMT: CPT | Performed by: NURSE PRACTITIONER

## 2022-03-21 PROCEDURE — 1159F MED LIST DOCD IN RCRD: CPT | Performed by: NURSE PRACTITIONER

## 2022-03-21 NOTE — PROGRESS NOTES
The ABCs of the Annual Wellness Visit  Initial Medicare Wellness Visit    Chief Complaint   Patient presents with   • Medicare Wellness-Initial Visit     Subjective   History of Present Illness:  Zabrina Contreras is a 71 y.o. female who presents for an Initial Medicare Wellness Visit.    The following portions of the patient's history were reviewed and   updated as appropriate: allergies, current medications, past family history, past medical history, past social history, past surgical history and problem list.     Compared to one year ago, the patient feels her physical   health is the same.    Compared to one year ago, the patient feels her mental   health is the same.    Recent Hospitalizations:  She was not admitted to the hospital during the last year.       Current Medical Providers:  Patient Care Team:  Cami Perry APRN as PCP - General (Nurse Practitioner)  Jaime Garcia MD as Cardiologist (Cardiology)    Outpatient Medications Prior to Visit   Medication Sig Dispense Refill   • cycloSPORINE (RESTASIS OP) Apply  to eye(s) as directed by provider 2 (two) times a day.     • dicyclomine (BENTYL) 10 MG capsule Take 1 capsule by mouth 3 (Three) Times a Day. (Patient taking differently: Take 10 mg by mouth Daily.) 270 capsule 3   • estradiol (ESTRACE) 1 MG tablet Take 1 tablet by mouth Daily. 90 tablet 3   • hyoscyamine (LEVSIN) 0.125 MG SL tablet Place 0.125 mg under the tongue 2 (Two) Times a Day As Needed.     • levothyroxine (SYNTHROID, LEVOTHROID) 112 MCG tablet TAKE 1 TABLET EVERY DAY 90 tablet 0     No facility-administered medications prior to visit.       No opioid medication identified on active medication list. I have reviewed chart for other potential  high risk medication/s and harmful drug interactions in the elderly.          Aspirin is not on active medication list.  Aspirin use is not indicated based on review of current medical condition/s. Risk of harm outweighs potential  benefits.  .    Patient Active Problem List   Diagnosis   • Irritable bowel syndrome without diarrhea   • History of colonic polyps   • Hypothyroidism   • Hypercholesterolemia   • Microcytic anemia   • Otalgia   • Palpitations   • Atypical chest pain   • Post-menopause   • BRBPR (bright red blood per rectum)   • Bloating   • Diarrhea   • Early satiety   • S/P cholecystectomy   • Bilateral lower abdominal cramping   • Upper abdominal pain   • Exposure to second hand tobacco smoke   • Chronic interstitial lung disease (HCC)     Advance Care Planning  Advance Directive is not on file.  ACP discussion was held with the patient during this visit. Patient has an advance directive (not in EMR), copy requested.    Review of Systems   Constitutional: Negative for activity change, appetite change, fatigue and fever.   HENT: Negative for congestion, ear discharge, ear pain, facial swelling, rhinorrhea, sinus pressure, sneezing, tinnitus and trouble swallowing.    Eyes: Negative for discharge and visual disturbance.   Respiratory: Negative for chest tightness, shortness of breath, wheezing and stridor.    Cardiovascular: Negative for chest pain, palpitations and leg swelling.   Gastrointestinal: Negative for abdominal distention, abdominal pain, constipation, diarrhea and nausea.   Endocrine: Negative for cold intolerance and heat intolerance.   Genitourinary: Negative for difficulty urinating, flank pain, frequency, hematuria and urgency.   Musculoskeletal: Negative for arthralgias, joint swelling, myalgias and neck pain.   Skin: Negative for color change and rash.   Allergic/Immunologic: Negative for environmental allergies.   Neurological: Negative for dizziness, tremors, weakness and confusion.   Hematological: Negative for adenopathy.   Psychiatric/Behavioral: Negative for decreased concentration and sleep disturbance. The patient is not nervous/anxious.         Objective       Vitals:    03/21/22 1117   BP: 108/68   BP  "Location: Left arm   Patient Position: Sitting   Cuff Size: Adult   Pulse: 74   Temp: 97.1 °F (36.2 °C)   SpO2: 99%   Weight: 72.1 kg (159 lb)   Height: 162.6 cm (64\")   PainSc: 0-No pain     BMI Readings from Last 1 Encounters:   03/21/22 27.29 kg/m²   BMI is above normal parameters. Recommendations include: educational material, exercise counseling and nutrition counseling    Does the patient have evidence of cognitive impairment? No    Physical Exam  Vitals and nursing note reviewed.   Constitutional:       Appearance: Normal appearance. She is well-developed, well-groomed and normal weight.   HENT:      Head: Normocephalic and atraumatic.      Right Ear: Hearing, tympanic membrane, ear canal and external ear normal.      Left Ear: Hearing, tympanic membrane, ear canal and external ear normal.      Nose: Nose normal.      Mouth/Throat:      Lips: Pink.      Mouth: Mucous membranes are moist.      Pharynx: Oropharynx is clear. Uvula midline.   Eyes:      General: Lids are normal. Lids are everted, no foreign bodies appreciated. Vision grossly intact.      Extraocular Movements: Extraocular movements intact.      Conjunctiva/sclera: Conjunctivae normal.      Pupils: Pupils are equal, round, and reactive to light.   Neck:      Thyroid: No thyroid mass or thyromegaly.      Vascular: No carotid bruit.      Trachea: Trachea and phonation normal.   Cardiovascular:      Rate and Rhythm: Normal rate and regular rhythm.      Pulses: Normal pulses.      Heart sounds: Normal heart sounds. No murmur heard.  Pulmonary:      Effort: Pulmonary effort is normal.      Breath sounds: Normal breath sounds.   Abdominal:      General: Bowel sounds are normal.      Palpations: Abdomen is soft.   Musculoskeletal:      Cervical back: Normal range of motion and neck supple.      Right lower leg: No edema.      Left lower leg: No edema.   Lymphadenopathy:      Head:      Right side of head: No submental, submandibular, tonsillar, " preauricular, posterior auricular or occipital adenopathy.      Left side of head: No submental, submandibular, tonsillar, preauricular, posterior auricular or occipital adenopathy.      Cervical: No cervical adenopathy.   Skin:     General: Skin is warm and dry.   Neurological:      Mental Status: She is alert and oriented to person, place, and time.   Psychiatric:         Behavior: Behavior normal. Behavior is cooperative.         Thought Content: Thought content normal.       Lab Results   Component Value Date    CHLPL 236 (H) 2022    TRIG 116 2022    HDL 54 2022     (H) 2022    VLDL 21 2022          HEALTH RISK ASSESSMENT    Smoking Status:  Social History     Tobacco Use   Smoking Status Former Smoker   • Packs/day: 1.00   • Years: 19.00   • Pack years: 19.00   • Quit date:    • Years since quittin.2   Smokeless Tobacco Never Used   Tobacco Comment    Quit 25 years ago     Alcohol Consumption:  Social History     Substance and Sexual Activity   Alcohol Use Yes    Comment: occasional     Fall Risk Screen:    RODERICK Fall Risk Assessment was completed, and patient is at LOW risk for falls.Assessment completed on:3/21/2022    Depression Screen:   PHQ-2/PHQ-9 Depression Screening 3/21/2022   Retired Total Score -   Little Interest or Pleasure in Doing Things 0-->not at all   Feeling Down, Depressed or Hopeless 0-->not at all   PHQ-9: Brief Depression Severity Measure Score 0       Health Habits and Functional and Cognitive Screening:  Functional & Cognitive Status 3/21/2022   Do you have difficulty preparing food and eating? No   Do you have difficulty bathing yourself, getting dressed or grooming yourself? No   Do you have difficulty using the toilet? No   Do you have difficulty moving around from place to place? No   Do you have trouble with steps or getting out of a bed or a chair? No   Current Diet Well Balanced Diet   Dental Exam Up to date   Eye Exam Up to date    Exercise (times per week) 3 times per week   Current Exercises Include Yard Work   Do you need help using the phone?  No   Are you deaf or do you have serious difficulty hearing?  No   Do you need help with transportation? No   Do you need help shopping? No   Do you need help preparing meals?  No   Do you need help with housework?  No   Do you need help with laundry? No   Do you need help taking your medications? No   Do you need help managing money? No   Do you ever drive or ride in a car without wearing a seat belt? No   Have you felt unusual stress, anger or loneliness in the last month? No   Who do you live with? Alone   If you need help, do you have trouble finding someone available to you? Yes   Have you been bothered in the last four weeks by sexual problems? No   Do you have difficulty concentrating, remembering or making decisions? No       Age-appropriate Screening Schedule:  Refer to the list below for future screening recommendations based on patient's age, sex and/or medical conditions. Orders for these recommended tests are listed in the plan section. The patient has been provided with a written plan.    Health Maintenance   Topic Date Due   • ZOSTER VACCINE (1 of 2) Never done   • TDAP/TD VACCINES (1 - Tdap) 03/21/2023 (Originally 8/30/1969)   • MAMMOGRAM  03/15/2023   • DXA SCAN  03/15/2023   • LIPID PANEL  03/21/2023   • INFLUENZA VACCINE  Completed            Assessment/Plan   CMS Preventative Services Quick Reference  Risk Factors Identified During Encounter  Inadequate Social Support, Isolation, Loneliness, Lack of Transportation, Financial Difficulties, or Caregiver Stress   Inactivity/Sedentary  Obesity/Overweight   Polypharmacy  The above risks/problems have been discussed with the patient.  Follow up actions/plans if indicated are seen below in the Assessment/Plan Section.  Pertinent information has been shared with the patient in the After Visit Summary.    Diagnoses and all orders for  this visit:    1. Medicare annual wellness visit, initial (Primary)    2. Encounter for screening mammogram for malignant neoplasm of breast    3. Encounter for long-term (current) use of other medications  -     Comprehensive Metabolic Panel  -     CBC & Differential  -     Uric Acid  -     Urinalysis With Microscopic If Indicated (No Culture) - Urine, Clean Catch    4. Hypercholesterolemia  -     Lipid Panel    5. Hypothyroidism, unspecified type  -     TSH    6. Eosinophilia, unspecified type  -     Ova & Parasite Examination - Stool, Per Rectum; Future    7. Diarrhea of presumed infectious origin  -     Ova & Parasite Examination - Stool, Per Rectum; Future    8. Eosinophilic leukocytosis  -     CBC & Differential; Future        Follow Up:  Return in about 6 months (around 9/21/2022) for Next scheduled follow up.     An After Visit Summary and PPPS were made available to the patient.    Labs are completed and resulted did show some increased eosinophils with elevated white blood cell count.  Patient has a history of leukocytosis last year but since she is also having some intermittent digestive issues in the past would like to go ahead and do some stool studies to rule out parasite or ova that could be underlying or if she is recovering from.  She denied any UTI symptoms and urine sample was clear for any signs of infection.    Thyroid is stable on current therapy no adjustment at this time.        I spent 30 minutes caring for Zabrina on this date of service. This time includes time spent by me in the following activities:preparing for the visit, reviewing tests, obtaining and/or reviewing a separately obtained history, performing a medically appropriate examination and/or evaluation , counseling and educating the patient/family/caregiver, ordering medications, tests, or procedures, documenting information in the medical record, independently interpreting results and communicating that information with the  patient/family/caregiver and care coordination

## 2022-03-22 LAB
ALBUMIN SERPL-MCNC: 4.3 G/DL (ref 3.5–5.2)
ALBUMIN/GLOB SERPL: 1.6 G/DL
ALP SERPL-CCNC: 92 U/L (ref 39–117)
ALT SERPL-CCNC: 13 U/L (ref 1–33)
APPEARANCE UR: CLEAR
AST SERPL-CCNC: 16 U/L (ref 1–32)
BASOPHILS # BLD AUTO: 0.09 10*3/MM3 (ref 0–0.2)
BASOPHILS NFR BLD AUTO: 0.7 % (ref 0–1.5)
BILIRUB SERPL-MCNC: 0.6 MG/DL (ref 0–1.2)
BILIRUB UR QL STRIP: NEGATIVE
BUN SERPL-MCNC: 11 MG/DL (ref 8–23)
BUN/CREAT SERPL: 16.9 (ref 7–25)
CALCIUM SERPL-MCNC: 9.6 MG/DL (ref 8.6–10.5)
CHLORIDE SERPL-SCNC: 102 MMOL/L (ref 98–107)
CHOLEST SERPL-MCNC: 236 MG/DL (ref 0–200)
CO2 SERPL-SCNC: 26.2 MMOL/L (ref 22–29)
COLOR UR: YELLOW
CREAT SERPL-MCNC: 0.65 MG/DL (ref 0.57–1)
EGFRCR SERPLBLD CKD-EPI 2021: 93.1 ML/MIN/1.73
EOSINOPHIL # BLD AUTO: 0.78 10*3/MM3 (ref 0–0.4)
EOSINOPHIL NFR BLD AUTO: 6.2 % (ref 0.3–6.2)
ERYTHROCYTE [DISTWIDTH] IN BLOOD BY AUTOMATED COUNT: 13.4 % (ref 12.3–15.4)
GLOBULIN SER CALC-MCNC: 2.7 GM/DL
GLUCOSE SERPL-MCNC: 90 MG/DL (ref 65–99)
GLUCOSE UR QL STRIP: NEGATIVE
HCT VFR BLD AUTO: 46.7 % (ref 34–46.6)
HDLC SERPL-MCNC: 54 MG/DL (ref 40–60)
HGB BLD-MCNC: 14.9 G/DL (ref 12–15.9)
HGB UR QL STRIP: NEGATIVE
IMM GRANULOCYTES # BLD AUTO: 0.04 10*3/MM3 (ref 0–0.05)
IMM GRANULOCYTES NFR BLD AUTO: 0.3 % (ref 0–0.5)
KETONES UR QL STRIP: NEGATIVE
LDLC SERPL CALC-MCNC: 161 MG/DL (ref 0–100)
LEUKOCYTE ESTERASE UR QL STRIP: NEGATIVE
LYMPHOCYTES # BLD AUTO: 3.25 10*3/MM3 (ref 0.7–3.1)
LYMPHOCYTES NFR BLD AUTO: 25.7 % (ref 19.6–45.3)
MCH RBC QN AUTO: 26 PG (ref 26.6–33)
MCHC RBC AUTO-ENTMCNC: 31.9 G/DL (ref 31.5–35.7)
MCV RBC AUTO: 81.6 FL (ref 79–97)
MONOCYTES # BLD AUTO: 0.71 10*3/MM3 (ref 0.1–0.9)
MONOCYTES NFR BLD AUTO: 5.6 % (ref 5–12)
NEUTROPHILS # BLD AUTO: 7.78 10*3/MM3 (ref 1.7–7)
NEUTROPHILS NFR BLD AUTO: 61.5 % (ref 42.7–76)
NITRITE UR QL STRIP: NEGATIVE
NRBC BLD AUTO-RTO: 0 /100 WBC (ref 0–0.2)
PH UR STRIP: 5.5 [PH] (ref 5–8)
PLATELET # BLD AUTO: 383 10*3/MM3 (ref 140–450)
POTASSIUM SERPL-SCNC: 4.7 MMOL/L (ref 3.5–5.2)
PROT SERPL-MCNC: 7 G/DL (ref 6–8.5)
PROT UR QL STRIP: NEGATIVE
RBC # BLD AUTO: 5.72 10*6/MM3 (ref 3.77–5.28)
SODIUM SERPL-SCNC: 139 MMOL/L (ref 136–145)
SP GR UR STRIP: 1.03 (ref 1–1.03)
TRIGL SERPL-MCNC: 116 MG/DL (ref 0–150)
TSH SERPL DL<=0.005 MIU/L-ACNC: 0.98 UIU/ML (ref 0.27–4.2)
URATE SERPL-MCNC: 4.1 MG/DL (ref 2.4–5.7)
UROBILINOGEN UR STRIP-MCNC: NORMAL MG/DL
VLDLC SERPL CALC-MCNC: 21 MG/DL (ref 5–40)
WBC # BLD AUTO: 12.65 10*3/MM3 (ref 3.4–10.8)

## 2022-03-30 DIAGNOSIS — E03.9 ACQUIRED HYPOTHYROIDISM: ICD-10-CM

## 2022-03-30 RX ORDER — LEVOTHYROXINE SODIUM 112 UG/1
TABLET ORAL
Qty: 90 TABLET | Refills: 0 | Status: SHIPPED | OUTPATIENT
Start: 2022-03-30 | End: 2022-06-13 | Stop reason: SDUPTHER

## 2022-05-17 ENCOUNTER — OFFICE VISIT (OUTPATIENT)
Dept: INTERNAL MEDICINE | Facility: CLINIC | Age: 72
End: 2022-05-17

## 2022-05-17 VITALS
BODY MASS INDEX: 28.67 KG/M2 | WEIGHT: 167 LBS | TEMPERATURE: 97.1 F | SYSTOLIC BLOOD PRESSURE: 92 MMHG | HEART RATE: 58 BPM | DIASTOLIC BLOOD PRESSURE: 60 MMHG | OXYGEN SATURATION: 99 %

## 2022-05-17 DIAGNOSIS — R30.0 DYSURIA: Primary | ICD-10-CM

## 2022-05-17 DIAGNOSIS — N30.90 CYSTITIS: ICD-10-CM

## 2022-05-17 LAB
BILIRUB BLD-MCNC: NEGATIVE MG/DL
CLARITY, POC: CLEAR
COLOR UR: YELLOW
GLUCOSE UR STRIP-MCNC: NEGATIVE MG/DL
KETONES UR QL: NEGATIVE
LEUKOCYTE EST, POC: NEGATIVE
NITRITE UR-MCNC: NEGATIVE MG/ML
PH UR: 5 [PH] (ref 5–8)
PROT UR STRIP-MCNC: NEGATIVE MG/DL
RBC # UR STRIP: NEGATIVE /UL
SP GR UR: 1.02 (ref 1–1.03)
UROBILINOGEN UR QL: NORMAL

## 2022-05-17 PROCEDURE — 99213 OFFICE O/P EST LOW 20 MIN: CPT

## 2022-05-17 PROCEDURE — 81003 URINALYSIS AUTO W/O SCOPE: CPT

## 2022-05-17 RX ORDER — AMOXICILLIN AND CLAVULANATE POTASSIUM 875; 125 MG/1; MG/1
1 TABLET, FILM COATED ORAL 2 TIMES DAILY
Qty: 14 TABLET | Refills: 0 | Status: SHIPPED | OUTPATIENT
Start: 2022-05-17 | End: 2022-05-24

## 2022-05-17 RX ORDER — FLUCONAZOLE 150 MG/1
150 TABLET ORAL ONCE
Qty: 1 TABLET | Refills: 0 | Status: SHIPPED | OUTPATIENT
Start: 2022-05-17 | End: 2022-05-17

## 2022-05-17 NOTE — PROGRESS NOTES
"Subjective   Zabrina Contrersa is a 71 y.o. female.   Chief Complaint   Patient presents with   • Urinary Tract Infection     Pain in bladder, dysuria, urinary frequency, swelling in urethra, symptoms x 3 wks       History of Present Illness   Ms. Contreras is here with complaints of pelvis discomfort, increased frequency, urethral discomfort, dysuria, foul smelling urine x 3 weeks.  She states that she has treated her self with AZO and her symptoms improve for a little while and then come back, last dose of AZO was yesterday. She also just came back from [place], driving. Admits to generally feeling unwell, is aching, no fever, has had intermittent CVA tenderness. When she urinated she feels \"needles\" in her bladder. Causative factor includes increased sexual activity. Has not noted hematuria.   She reports is has been several years since she has had a UTI.   Reports she gets a yeast infection with abx.     The following portions of the patient's history were reviewed and updated as appropriate: allergies, current medications, past family history, past medical history, past social history, past surgical history and problem list.    Review of Systems    Objective   Past Medical History:   Diagnosis Date   • Chest pain 1/6/2017   • Disease of thyroid gland    • Hyperlipidemia    • Hypocholesteremia 1/6/2017   • Hypothyroidism 1/6/2017   • Microcytic anemia 1/6/2017   • Otalgia 1/6/2017   • Palpitations 1/6/2017      Past Surgical History:   Procedure Laterality Date   • APPENDECTOMY     • CHOLECYSTECTOMY     • HYSTERECTOMY     • TONSILLECTOMY          Current Outpatient Medications:   •  dicyclomine (BENTYL) 10 MG capsule, Take 1 capsule by mouth 3 (Three) Times a Day. (Patient taking differently: Take 10 mg by mouth Daily.), Disp: 270 capsule, Rfl: 3  •  estradiol (ESTRACE) 1 MG tablet, Take 1 tablet by mouth Daily., Disp: 90 tablet, Rfl: 3  •  hyoscyamine (LEVSIN) 0.125 MG SL tablet, Place 0.125 mg under the tongue 2 (Two) " Times a Day As Needed., Disp: , Rfl:   •  levothyroxine (SYNTHROID, LEVOTHROID) 112 MCG tablet, TAKE 1 TABLET EVERY DAY, Disp: 90 tablet, Rfl: 0  •  amoxicillin-clavulanate (Augmentin) 875-125 MG per tablet, Take 1 tablet by mouth 2 (Two) Times a Day for 7 days., Disp: 14 tablet, Rfl: 0  •  fluconazole (Diflucan) 150 MG tablet, Take 1 tablet by mouth 1 (One) Time for 1 dose., Disp: 1 tablet, Rfl: 0      BP 92/60 (BP Location: Left arm, Patient Position: Sitting, Cuff Size: Adult)   Pulse 58   Temp 97.1 °F (36.2 °C) (Temporal)   Wt 75.8 kg (167 lb)   LMP  (LMP Unknown)   SpO2 99%   Breastfeeding No   BMI 28.67 kg/m²      Body mass index is 28.67 kg/m².         Physical Exam  Constitutional:       General: She is not in acute distress.     Appearance: Normal appearance. She is normal weight. She is ill-appearing. She is not toxic-appearing or diaphoretic.   HENT:      Head: Normocephalic and atraumatic.   Eyes:      Extraocular Movements: Extraocular movements intact.      Conjunctiva/sclera: Conjunctivae normal.      Pupils: Pupils are equal, round, and reactive to light.   Cardiovascular:      Rate and Rhythm: Normal rate and regular rhythm.      Pulses: Normal pulses.      Heart sounds: Normal heart sounds.   Pulmonary:      Effort: Pulmonary effort is normal. No respiratory distress.      Breath sounds: Normal breath sounds.   Abdominal:      General: Abdomen is flat. Bowel sounds are normal.      Palpations: Abdomen is soft.      Tenderness: There is abdominal tenderness (pelvic region). There is no right CVA tenderness or left CVA tenderness.   Musculoskeletal:         General: Normal range of motion.   Skin:     General: Skin is warm and dry.   Neurological:      General: No focal deficit present.      Mental Status: She is alert and oriented to person, place, and time. Mental status is at baseline.   Psychiatric:         Mood and Affect: Mood normal.         Behavior: Behavior normal.         Thought  Content: Thought content normal.         Judgment: Judgment normal.               Assessment & Plan   Diagnoses and all orders for this visit:    1. Dysuria (Primary)  -     Urine Culture - Urine, Urine, Clean Catch  -     POC Urinalysis Dipstick, Multipro  -     fluconazole (Diflucan) 150 MG tablet; Take 1 tablet by mouth 1 (One) Time for 1 dose.  Dispense: 1 tablet; Refill: 0    2. Cystitis  -     amoxicillin-clavulanate (Augmentin) 875-125 MG per tablet; Take 1 tablet by mouth 2 (Two) Times a Day for 7 days.  Dispense: 14 tablet; Refill: 0               Plan of care discussed with Ms. Contreras  Her urine here today shows no abnormalities, she reports AZO yesterday and has also been drinking a lot of water. Will send off for culture. AZO may be reason we are seeing anything on dipstick, her symptoms strongly indicate cystitis. Will start therapy. I am concerned with how long these symptoms have been going on, I have asked that she reach out if no improvement in 48 hours or before this is symptoms worsen. I have advised continued well-hydration, avoid carbonated drinks. I have advised urinating before and after intercourse and to drink a glass of water after. Will send in diflucan, advised not to use unless actual yeast infection occurs and to take a probiotic while taking the antibiotic.   Will keep next scheduled appointment. Can be seen prior to as needed.

## 2022-05-19 LAB
BACTERIA UR CULT: NO GROWTH
BACTERIA UR CULT: NORMAL

## 2022-06-10 DIAGNOSIS — Z12.31 ENCOUNTER FOR SCREENING MAMMOGRAM FOR MALIGNANT NEOPLASM OF BREAST: Primary | ICD-10-CM

## 2022-06-13 DIAGNOSIS — E03.9 ACQUIRED HYPOTHYROIDISM: ICD-10-CM

## 2022-06-13 RX ORDER — LEVOTHYROXINE SODIUM 112 UG/1
112 TABLET ORAL DAILY
Qty: 90 TABLET | Refills: 3 | Status: SHIPPED | OUTPATIENT
Start: 2022-06-13 | End: 2023-03-22 | Stop reason: SDUPTHER

## 2022-07-01 ENCOUNTER — TELEPHONE (OUTPATIENT)
Dept: INTERNAL MEDICINE | Facility: CLINIC | Age: 72
End: 2022-07-01

## 2022-07-01 DIAGNOSIS — Z78.0 POST-MENOPAUSE: ICD-10-CM

## 2022-07-01 DIAGNOSIS — Z12.31 ENCOUNTER FOR SCREENING MAMMOGRAM FOR MALIGNANT NEOPLASM OF BREAST: ICD-10-CM

## 2022-07-01 RX ORDER — ESTRADIOL 1 MG/1
1 TABLET ORAL DAILY
Qty: 90 TABLET | Refills: 3 | Status: CANCELLED | OUTPATIENT
Start: 2022-07-01

## 2022-07-01 NOTE — TELEPHONE ENCOUNTER
Caller: Zabrina Contreras    Relationship: Self    Best call back number: [phone]    Requested Prescriptions:   Requested Prescriptions     Pending Prescriptions Disp Refills   • estradiol (ESTRACE) 1 MG tablet 90 tablet 3     Sig: Take 1 tablet by mouth Daily.        Pharmacy where request should be sent: [facility] (NOW [facility]) - [facility] [address] - [phone]  - [phone]     Please advise when and if medication can be called in. If unable to be filled, please advise with callback at the phone number listed above.    Patient states that they were supposed to be faxing mammogram results to office, please let her know if received.    Thank you,  Abilio Johnson, PCT

## 2022-07-05 DIAGNOSIS — Z78.0 POST-MENOPAUSE: ICD-10-CM

## 2022-07-06 NOTE — TELEPHONE ENCOUNTER
Caller:  SIRIA KITCHEN      Relationship: SELF    Best call back number: [phone]    Requested Prescriptions:   Requested Prescriptions     Pending Prescriptions Disp Refills   • estradiol (ESTRACE) 1 MG tablet 90 tablet 3     Sig: Take 1 tablet by mouth Daily.        Pharmacy where request should be sent:  [facility] (Now [facility]) - [facility] [address] - [phone]  - [phone] FX  [phone]    Additional details provided by patient: PATIENT IS CONCERNED AS SHE IS ALMOST OUT OF MEDICATION, SHE IS REQUESTING A FILL AS SOON AS POSSIBLE.     Does the patient have less than a 3 day supply:  [x] Yes  [] No    Darrel Keene Rep   07/06/22 08:53 CDT

## 2022-07-07 RX ORDER — ESTRADIOL 1 MG/1
1 TABLET ORAL DAILY
Qty: 90 TABLET | Refills: 3 | Status: SHIPPED | OUTPATIENT
Start: 2022-07-07

## 2022-09-19 ENCOUNTER — OFFICE VISIT (OUTPATIENT)
Dept: INTERNAL MEDICINE | Facility: CLINIC | Age: 72
End: 2022-09-19

## 2022-09-19 VITALS
HEIGHT: 64 IN | DIASTOLIC BLOOD PRESSURE: 74 MMHG | TEMPERATURE: 97.5 F | SYSTOLIC BLOOD PRESSURE: 118 MMHG | WEIGHT: 165.8 LBS | OXYGEN SATURATION: 98 % | BODY MASS INDEX: 28.31 KG/M2 | HEART RATE: 70 BPM

## 2022-09-19 DIAGNOSIS — E78.00 HYPERCHOLESTEROLEMIA: Primary | ICD-10-CM

## 2022-09-19 DIAGNOSIS — E03.9 ACQUIRED HYPOTHYROIDISM: ICD-10-CM

## 2022-09-19 PROCEDURE — 99213 OFFICE O/P EST LOW 20 MIN: CPT | Performed by: NURSE PRACTITIONER

## 2022-09-19 NOTE — PROGRESS NOTES
Subjective     Chief Complaint:  Hypothyroidism    HPI:  Patient presents to the office today for a 6-month follow-up.  She does have hypothyroidism and takes levothyroxine.  She denies any complaints today.  She does inquire if she could go back to having a mammogram every other year.  She states she has been taking estradiol for numerous years, feels that this is doing well and denies any complaints with this medication.  She has never had an abnormal mammogram.  She does perform monthly self breast exams.    The patient states she was taking red yeast rice for her cholesterol, and had stopped that as she was having numerous digestive issues and was unsure if one of her supplements was causing this.  She now believes that her digestive issues are caused from artificial sweetener erythritol and she is going to restart red yeast rice.  She has stated that she does not want to take cholesterol medication if not absolutely necessary.  She does try to take care of herself, stating she does utilize weight watchers daily.  She does not eat fast or fried food.    Patient's PMR from outside medical facility reviewed and noted.    Past Medical History:   Past Medical History:   Diagnosis Date   • Chest pain 1/6/2017   • Disease of thyroid gland    • Hyperlipidemia    • Hypocholesteremia 1/6/2017   • Hypothyroidism 1/6/2017   • Microcytic anemia 1/6/2017   • Otalgia 1/6/2017   • Palpitations 1/6/2017     Past Surgical History:  Past Surgical History:   Procedure Laterality Date   • APPENDECTOMY     • CHOLECYSTECTOMY     • HYSTERECTOMY     • TONSILLECTOMY       Social History:  reports that she quit smoking about 37 years ago. She has a 19.00 pack-year smoking history. She has never used smokeless tobacco. She reports current alcohol use. She reports that she does not use drugs.    Family History: family history includes COPD in her mother; No Known Problems in her father.      Allergies:  Allergies   Allergen  "Reactions   • Demerol  [Meperidine Hcl] Unknown - High Severity   • Meperidine Other (See Comments)     Blood pressure drops, she is still able to use this.    • Codeine Nausea And Vomiting     Nausea, vomiting, dizziness     Medications:  Prior to Admission medications    Medication Sig Start Date End Date Taking? Authorizing Provider   estradiol (ESTRACE) 1 MG tablet Take 1 tablet by mouth Daily. 7/7/22  Yes Mansi Olmos APRN   hyoscyamine (LEVSIN) 0.125 MG SL tablet Place 0.125 mg under the tongue 2 (Two) Times a Day As Needed. 7/15/20  Yes Provider, MD Malgorzata   levothyroxine (SYNTHROID, LEVOTHROID) 112 MCG tablet Take 1 tablet by mouth Daily. 6/13/22  Yes Emma Patton APRN   dicyclomine (BENTYL) 10 MG capsule Take 1 capsule by mouth 3 (Three) Times a Day.  Patient taking differently: Take 10 mg by mouth Daily. 3/16/20   Cami Perry APRN       Objective     Vital Signs: /74   Pulse 70   Temp 97.5 °F (36.4 °C)   Ht 162.6 cm (64\")   Wt 75.2 kg (165 lb 12.8 oz)   LMP  (LMP Unknown)   SpO2 98%   BMI 28.46 kg/m²   Physical Exam  Vitals and nursing note reviewed.   Constitutional:       General: She is not in acute distress.     Appearance: She is not ill-appearing or toxic-appearing.   HENT:      Head: Normocephalic and atraumatic.      Mouth/Throat:      Mouth: Mucous membranes are moist.      Pharynx: Oropharynx is clear.   Cardiovascular:      Rate and Rhythm: Normal rate and regular rhythm.      Pulses: Normal pulses.      Heart sounds: Normal heart sounds.   Pulmonary:      Effort: Pulmonary effort is normal.      Breath sounds: No wheezing, rhonchi or rales.   Abdominal:      General: Bowel sounds are normal. There is no distension.      Palpations: Abdomen is soft.      Tenderness: There is no abdominal tenderness.   Musculoskeletal:         General: No swelling or tenderness. Normal range of motion.      Cervical back: Normal range of motion and neck supple. No " tenderness.   Skin:     General: Skin is warm and dry.      Findings: No erythema or rash.   Neurological:      General: No focal deficit present.      Mental Status: She is alert and oriented to person, place, and time.   Psychiatric:         Mood and Affect: Mood normal.         Behavior: Behavior normal.         Thought Content: Thought content normal.         Judgment: Judgment normal.       Results Reviewed:  Reviewed last office visit note from 3/21/2022 from annual wellness visit    Assessment / Plan     Assessment/Plan:  Diagnoses and all orders for this visit:    1. Hypercholesterolemia (Primary)    2. Acquired hypothyroidism  -     T4  -     TSH  -     Lipid panel    Other orders  -     Cancel: COVID-19,[facility] ROUTINE, NP/OP SWAB IN TRANSPORT MEDIA OR ESWAB 72 HR TAT - Swab, Nasopharynx      Patient presents for a 6-month follow-up.  She does have hypothyroidism and takes levothyroxine.  Will assess TSH and T4 today.    Patient also has elevated total and LDL cholesterol noted on labs from 3/21.  Unknown if patient was fasting for these labs.  She is fasting today, so would like to reassess.  She does indicate that she would not like to take cholesterol medication and is going to restart supplement red yeast rice for cholesterol.  She does try to eat healthy, is maintained on the weight watchers program.  She does not eat fried or fast food.  We will follow-up with lab results when available and make changes to plan of care as necessary and communicate these with the patient.    Feel that it is reasonable for the patient to go to mammogram screenings every other year, will next be due in 2024.  Her mammogram from June of this year was normal.  She will be performing self breast exams every month and will notify our office for any concerns or changes.    Return in about 6 months (around 3/19/2023) for Annual physical. unless patient needs to be seen sooner or acute issues arise.    I have discussed the  patient results/orders and and plan/recommendation with them at today's visit.      Emma Patton, LG   09/19/2022    Answers for HPI/ROS submitted by the patient on 9/18/2022  What is the primary reason for your visit?: Physical

## 2022-09-20 ENCOUNTER — TELEPHONE (OUTPATIENT)
Dept: INTERNAL MEDICINE | Facility: CLINIC | Age: 72
End: 2022-09-20

## 2022-09-20 DIAGNOSIS — E03.9 ACQUIRED HYPOTHYROIDISM: Primary | ICD-10-CM

## 2022-09-20 LAB
CHOLEST SERPL-MCNC: 214 MG/DL (ref 0–200)
HDLC SERPL-MCNC: 54 MG/DL (ref 40–60)
LDLC SERPL CALC-MCNC: 138 MG/DL (ref 0–100)
T4 SERPL-MCNC: 13.3 UG/DL (ref 4.5–12)
TRIGL SERPL-MCNC: 126 MG/DL (ref 0–150)
TSH SERPL DL<=0.005 MIU/L-ACNC: 0.35 UIU/ML (ref 0.27–4.2)
VLDLC SERPL CALC-MCNC: 22 MG/DL (ref 5–40)

## 2022-09-21 NOTE — PROGRESS NOTES
Patient's cholesterol panel is improved compared to previous. Still some room for improvement so patient to continue with weight watchers and would advise to increase exercise.

## 2022-09-21 NOTE — TELEPHONE ENCOUNTER
TSH is normal, free T4 is slightly elevated. No adjustment to her medication at this time but would like to recheck in the next 6 weeks to see if this has normalized.

## 2022-10-11 ENCOUNTER — OFFICE VISIT (OUTPATIENT)
Dept: INTERNAL MEDICINE | Facility: CLINIC | Age: 72
End: 2022-10-11

## 2022-10-11 VITALS
OXYGEN SATURATION: 99 % | TEMPERATURE: 96.9 F | HEIGHT: 64 IN | HEART RATE: 74 BPM | SYSTOLIC BLOOD PRESSURE: 128 MMHG | WEIGHT: 164 LBS | DIASTOLIC BLOOD PRESSURE: 82 MMHG | BODY MASS INDEX: 28 KG/M2

## 2022-10-11 DIAGNOSIS — I95.9 HYPOTENSION, UNSPECIFIED HYPOTENSION TYPE: ICD-10-CM

## 2022-10-11 DIAGNOSIS — H65.91 FLUID LEVEL BEHIND TYMPANIC MEMBRANE OF RIGHT EAR: Primary | ICD-10-CM

## 2022-10-11 PROCEDURE — 96372 THER/PROPH/DIAG INJ SC/IM: CPT | Performed by: NURSE PRACTITIONER

## 2022-10-11 PROCEDURE — 99213 OFFICE O/P EST LOW 20 MIN: CPT | Performed by: NURSE PRACTITIONER

## 2022-10-11 RX ORDER — TRIAMCINOLONE ACETONIDE 40 MG/ML
40 INJECTION, SUSPENSION INTRA-ARTICULAR; INTRAMUSCULAR ONCE
Status: COMPLETED | OUTPATIENT
Start: 2022-10-11 | End: 2022-10-11

## 2022-10-11 RX ADMIN — TRIAMCINOLONE ACETONIDE 40 MG: 40 INJECTION, SUSPENSION INTRA-ARTICULAR; INTRAMUSCULAR at 10:54

## 2022-10-11 NOTE — PROGRESS NOTES
Subjective     Chief Complaint:  Right ear pain.  Hypotension.    HPI:  Patient presents to the office today with complaints of right ear pain/fullness and pressure which started last night prior to her going to bed.  She states this continues this morning and overall she does not feel well.  She denies seeing any drainage from the ear.  She has had an infrequent, nonproductive cough for the last few days as well.  She denies any nasal or chest congestion.  She denies fever chills.  She denies sore throat.  She denies shortness of breath or chest pain.  She has not gotten water in her ears that she is aware of.  She does not wear headphones or earplugs frequently.  She has been taking Flonase and Claritin at home.    The patient also notes that her blood pressure sometimes runs low at home.  It is well controlled in the office today at 128/82.  She states at home sometimes her blood pressure will run as low as 90/57, typically systolic blood pressure runs between 101 15.  On the days where her blood pressure is running low she feels fatigued.  She notes this is not doing this consistently, rather intermittently.    Patient's PMR from outside medical facility reviewed and noted.    Past Medical History:   Past Medical History:   Diagnosis Date   • Chest pain 1/6/2017   • Disease of thyroid gland    • Hyperlipidemia    • Hypocholesteremia 1/6/2017   • Hypothyroidism 1/6/2017   • Microcytic anemia 1/6/2017   • Otalgia 1/6/2017   • Palpitations 1/6/2017     Past Surgical History:  Past Surgical History:   Procedure Laterality Date   • APPENDECTOMY     • CHOLECYSTECTOMY     • HYSTERECTOMY     • TONSILLECTOMY       Social History:  reports that she quit smoking about 37 years ago. She has a 19.00 pack-year smoking history. She has never used smokeless tobacco. She reports current alcohol use. She reports that she does not use drugs.    Family History: family history includes COPD in her mother; No Known Problems  "in her father.      Allergies:  Allergies   Allergen Reactions   • Demerol  [Meperidine Hcl] Unknown - High Severity   • Meperidine Other (See Comments)     Blood pressure drops, she is still able to use this.    • Codeine Nausea And Vomiting     Nausea, vomiting, dizziness     Medications:  Prior to Admission medications    Medication Sig Start Date End Date Taking? Authorizing Provider   dicyclomine (BENTYL) 10 MG capsule Take 1 capsule by mouth 3 (Three) Times a Day.  Patient taking differently: Take 1 capsule by mouth Daily. 3/16/20  Yes Cami Perry APRN   estradiol (ESTRACE) 1 MG tablet Take 1 tablet by mouth Daily. 7/7/22  Yes Mansi Olmos APRN   hyoscyamine (LEVSIN) 0.125 MG SL tablet Place 0.125 mg under the tongue 2 (Two) Times a Day As Needed. 7/15/20  Yes Provider, MD Malgorzata   levothyroxine (SYNTHROID, LEVOTHROID) 112 MCG tablet Take 1 tablet by mouth Daily. 6/13/22  Yes Emma Patton APRN       Objective     Vital Signs: /82 (BP Location: Left arm, Patient Position: Sitting, Cuff Size: Adult)   Pulse 74   Temp 96.9 °F (36.1 °C) (Temporal)   Ht 162.6 cm (64\")   Wt 74.4 kg (164 lb)   LMP  (LMP Unknown)   SpO2 99%   BMI 28.15 kg/m²   Physical Exam  Vitals and nursing note reviewed.   Constitutional:       General: She is not in acute distress.     Appearance: She is not ill-appearing or toxic-appearing.   HENT:      Head: Normocephalic and atraumatic.      Right Ear: Decreased hearing noted. Tenderness present. Tympanic membrane has decreased mobility.      Mouth/Throat:      Mouth: Mucous membranes are moist.      Pharynx: Oropharynx is clear.   Cardiovascular:      Rate and Rhythm: Normal rate and regular rhythm.      Pulses: Normal pulses.      Heart sounds: Normal heart sounds.   Pulmonary:      Effort: Pulmonary effort is normal.      Breath sounds: No wheezing, rhonchi or rales.   Abdominal:      General: Bowel sounds are normal. There is no distension. "      Palpations: Abdomen is soft.      Tenderness: There is no abdominal tenderness.   Musculoskeletal:         General: No swelling or tenderness. Normal range of motion.      Cervical back: Normal range of motion and neck supple. No tenderness.   Skin:     General: Skin is warm and dry.      Findings: No erythema or rash.   Neurological:      General: No focal deficit present.      Mental Status: She is alert and oriented to person, place, and time.   Psychiatric:         Mood and Affect: Mood normal.         Behavior: Behavior normal.         Thought Content: Thought content normal.         Judgment: Judgment normal.       Assessment / Plan     Assessment/Plan:  Diagnoses and all orders for this visit:    1. Fluid level behind tympanic membrane of right ear (Primary)  -     triamcinolone acetonide (KENALOG-40) injection 40 mg    2. Hypotension, unspecified hypotension type       Patient presents the office today with a less than 24-hour history of right ear pain, pressure/fullness.  She does have some erythema to the right ear canal noted with decreased tympanic membrane mobility.  Will give Kenalog injection in the office today and have advised patient to continue using Flonase and Claritin.  She has been instructed to call for any worsening or no improvement of symptoms in the next 24 to 48 hours.    Regarding the patient's intermittent hypotension, have asked her to monitor this more regularly and please inform our office should this become a more frequent occurrence.  On the days when her blood pressure does run low for her, she has been encouraged to increase hydration and use electrolyte replacement beverages.    No follow-ups on file. unless patient needs to be seen sooner or acute issues arise.    I have discussed the patient results/orders and and plan/recommendation with them at today's visit.      Emma Patton, APRN   10/11/2022

## 2023-02-28 ENCOUNTER — OFFICE VISIT (OUTPATIENT)
Dept: INTERNAL MEDICINE | Facility: CLINIC | Age: 73
End: 2023-02-28
Payer: MEDICARE

## 2023-02-28 ENCOUNTER — TELEPHONE (OUTPATIENT)
Dept: INTERNAL MEDICINE | Facility: CLINIC | Age: 73
End: 2023-02-28

## 2023-02-28 VITALS — TEMPERATURE: 98.6 F

## 2023-02-28 DIAGNOSIS — J01.10 ACUTE NON-RECURRENT FRONTAL SINUSITIS: Primary | ICD-10-CM

## 2023-02-28 PROCEDURE — 99441 PR PHYS/QHP TELEPHONE EVALUATION 5-10 MIN: CPT

## 2023-02-28 RX ORDER — METHYLPREDNISOLONE 4 MG/1
TABLET ORAL
Qty: 21 TABLET | Refills: 0 | Status: SHIPPED | OUTPATIENT
Start: 2023-02-28 | End: 2023-03-28

## 2023-02-28 RX ORDER — AMOXICILLIN AND CLAVULANATE POTASSIUM 875; 125 MG/1; MG/1
1 TABLET, FILM COATED ORAL 2 TIMES DAILY
Qty: 14 TABLET | Refills: 0 | Status: SHIPPED | OUTPATIENT
Start: 2023-02-28 | End: 2023-03-07

## 2023-02-28 NOTE — TELEPHONE ENCOUNTER
Caller: Zabrina Contreras    Relationship: Self    Best call back number: [phone]    What medication are you requesting: STEROIDS AND ANTIBIOTIC    What are your current symptoms: COUGH, SINUS DRAINAGE (SINUS ARE BURNING)    How long have you been experiencing symptoms: 4 DAYS    Have you had these symptoms before:    [] Yes  [x] No    Have you been treated for these symptoms before:   [] Yes  [x] No    If a prescription is needed, what is your preferred pharmacy and phone number:    [facility] - [place], [place] [phone] PH/ [phone]    Additional notes:  NEEDS SOMETHING TO DRY UP SINUS

## 2023-02-28 NOTE — PROGRESS NOTES
Subjective     You have chosen to receive care through a telephone visit. Do you consent to use a telephone visit for your medical care today? Yes    Chief Complaint:  Sinus congestion, fever    HPI:  Patient presents today via telephone visit with complaints of nasal congestion, sinus pressure, right ear ache, fever, and cough.  She states that her symptoms started approximately 1 week ago but she started feeling much worse on Friday.  She states that her fever has reached 101.  She states that her cough has improved and she has not noticed as much sputum production over the last couple of days.  However, she continues to have nasal congestion and sinus pressure in the frontal area. She has tried taking Mucinex but was unable to tolerate it because it makes her feel bad. She has been taking Tylenol as needed.  She denies sore throat or shortness of breath.    Past Medical History:   Past Medical History:   Diagnosis Date   • Chest pain 1/6/2017   • Disease of thyroid gland    • Hyperlipidemia    • Hypocholesteremia 1/6/2017   • Hypothyroidism 1/6/2017   • Microcytic anemia 1/6/2017   • Otalgia 1/6/2017   • Palpitations 1/6/2017     Past Surgical History:  Past Surgical History:   Procedure Laterality Date   • APPENDECTOMY     • CHOLECYSTECTOMY     • HYSTERECTOMY     • TONSILLECTOMY         Allergies:  Allergies   Allergen Reactions   • Demerol  [Meperidine Hcl] Unknown - High Severity   • Meperidine Other (See Comments)     Blood pressure drops, she is still able to use this.    • Codeine Nausea And Vomiting     Nausea, vomiting, dizziness     Medications:  Prior to Admission medications    Medication Sig Start Date End Date Taking? Authorizing Provider   dicyclomine (BENTYL) 10 MG capsule Take 1 capsule by mouth 3 (Three) Times a Day.  Patient taking differently: Take 1 capsule by mouth Daily. 3/16/20   Cami Perry APRN   estradiol (ESTRACE) 1 MG tablet Take 1 tablet by mouth Daily. 7/7/22    Mansi Olmos, LG   hyoscyamine (LEVSIN) 0.125 MG SL tablet Place 0.125 mg under the tongue 2 (Two) Times a Day As Needed. 7/15/20   Provider, MD Malgorzata   levothyroxine (SYNTHROID, LEVOTHROID) 112 MCG tablet Take 1 tablet by mouth Daily. 6/13/22   Emma Patton APRN       Objective     Vital Signs: Temp 98.6 °F (37 °C)   LMP  (LMP Unknown)   Physical Exam  Unable to perform physical exam via telephone visit    Results Reviewed:  Reviewed note from office visit on 10/11/2022.    Assessment / Plan     Assessment/Plan:  Diagnoses and all orders for this visit:    1. Acute non-recurrent frontal sinusitis (Primary)  -     amoxicillin-clavulanate (Augmentin) 875-125 MG per tablet; Take 1 tablet by mouth 2 (Two) Times a Day for 7 days.  Dispense: 14 tablet; Refill: 0  -     methylPREDNISolone (MEDROL) 4 MG dose pack; Take as directed on package instructions.  Dispense: 21 tablet; Refill: 0      Since patient's symptoms have been present for approximately 1 week and she has had a fever, feel that it would be appropriate to proceed with antibiotic therapy.  Will give Augmentin for 7 days.  Will also start Medrol Dosepak.  Encouraged her to use Flonase nasal spray which she already has at home.  She has been unable to tolerate Mucinex.  She does not feel that she needs a cough suppressant.  She will continue taking Tylenol as needed for fever.  Encouraged her to drink plenty of fluids and rest.  Also discussed that she will need to be seen in the clinic if her symptoms fail to improve.  She expressed understanding.    Return for Next scheduled follow up. unless patient needs to be seen sooner or acute issues arise.    I have discussed the patient results/orders and and plan/recommendation with them at today's visit.      7 minutes spent on the telephone with patient    Jenny GILLIAM LG Wu   02/28/2023

## 2023-03-21 ENCOUNTER — LAB (OUTPATIENT)
Dept: INTERNAL MEDICINE | Facility: CLINIC | Age: 73
End: 2023-03-21
Payer: MEDICARE

## 2023-03-21 DIAGNOSIS — Z79.899 ENCOUNTER FOR LONG-TERM CURRENT USE OF MEDICATION: ICD-10-CM

## 2023-03-21 DIAGNOSIS — E78.00 HYPERCHOLESTEROLEMIA: Primary | ICD-10-CM

## 2023-03-21 DIAGNOSIS — E03.9 HYPOTHYROIDISM, UNSPECIFIED TYPE: ICD-10-CM

## 2023-03-22 DIAGNOSIS — E03.9 ACQUIRED HYPOTHYROIDISM: ICD-10-CM

## 2023-03-22 LAB
ALBUMIN SERPL-MCNC: 4.6 G/DL (ref 3.5–5.2)
ALBUMIN/GLOB SERPL: 1.7 G/DL
ALP SERPL-CCNC: 80 U/L (ref 39–117)
ALT SERPL-CCNC: 15 U/L (ref 1–33)
APPEARANCE UR: ABNORMAL
AST SERPL-CCNC: 17 U/L (ref 1–32)
BACTERIA #/AREA URNS HPF: ABNORMAL /HPF
BASOPHILS # BLD AUTO: 0.07 10*3/MM3 (ref 0–0.2)
BASOPHILS NFR BLD AUTO: 0.8 % (ref 0–1.5)
BILIRUB SERPL-MCNC: 0.2 MG/DL (ref 0–1.2)
BILIRUB UR QL STRIP: NEGATIVE
BUN SERPL-MCNC: 15 MG/DL (ref 8–23)
BUN/CREAT SERPL: 25.9 (ref 7–25)
CALCIUM SERPL-MCNC: 10.2 MG/DL (ref 8.6–10.5)
CASTS URNS MICRO: ABNORMAL
CHLORIDE SERPL-SCNC: 105 MMOL/L (ref 98–107)
CHOLEST SERPL-MCNC: 232 MG/DL (ref 0–200)
CO2 SERPL-SCNC: 23.1 MMOL/L (ref 22–29)
COLOR UR: YELLOW
CREAT SERPL-MCNC: 0.58 MG/DL (ref 0.57–1)
EGFRCR SERPLBLD CKD-EPI 2021: 95.1 ML/MIN/1.73
EOSINOPHIL # BLD AUTO: 0.34 10*3/MM3 (ref 0–0.4)
EOSINOPHIL NFR BLD AUTO: 3.9 % (ref 0.3–6.2)
EPI CELLS #/AREA URNS HPF: ABNORMAL /HPF
ERYTHROCYTE [DISTWIDTH] IN BLOOD BY AUTOMATED COUNT: 13.9 % (ref 12.3–15.4)
GLOBULIN SER CALC-MCNC: 2.7 GM/DL
GLUCOSE SERPL-MCNC: 94 MG/DL (ref 65–99)
GLUCOSE UR QL STRIP: NEGATIVE
HCT VFR BLD AUTO: 47.1 % (ref 34–46.6)
HDLC SERPL-MCNC: 59 MG/DL (ref 40–60)
HGB BLD-MCNC: 15 G/DL (ref 12–15.9)
HGB UR QL STRIP: NEGATIVE
IMM GRANULOCYTES # BLD AUTO: 0.02 10*3/MM3 (ref 0–0.05)
IMM GRANULOCYTES NFR BLD AUTO: 0.2 % (ref 0–0.5)
KETONES UR QL STRIP: NEGATIVE
LDLC SERPL CALC-MCNC: 133 MG/DL (ref 0–100)
LEUKOCYTE ESTERASE UR QL STRIP: NEGATIVE
LYMPHOCYTES # BLD AUTO: 2.7 10*3/MM3 (ref 0.7–3.1)
LYMPHOCYTES NFR BLD AUTO: 30.8 % (ref 19.6–45.3)
MCH RBC QN AUTO: 25.8 PG (ref 26.6–33)
MCHC RBC AUTO-ENTMCNC: 31.8 G/DL (ref 31.5–35.7)
MCV RBC AUTO: 81.1 FL (ref 79–97)
MONOCYTES # BLD AUTO: 0.97 10*3/MM3 (ref 0.1–0.9)
MONOCYTES NFR BLD AUTO: 11.1 % (ref 5–12)
NEUTROPHILS # BLD AUTO: 4.67 10*3/MM3 (ref 1.7–7)
NEUTROPHILS NFR BLD AUTO: 53.2 % (ref 42.7–76)
NITRITE UR QL STRIP: NEGATIVE
NRBC BLD AUTO-RTO: 0 /100 WBC (ref 0–0.2)
PH UR STRIP: 6 [PH] (ref 5–8)
PLATELET # BLD AUTO: 392 10*3/MM3 (ref 140–450)
POTASSIUM SERPL-SCNC: 4.6 MMOL/L (ref 3.5–5.2)
PROT SERPL-MCNC: 7.3 G/DL (ref 6–8.5)
PROT UR QL STRIP: NEGATIVE
RBC # BLD AUTO: 5.81 10*6/MM3 (ref 3.77–5.28)
RBC #/AREA URNS HPF: ABNORMAL /HPF
SODIUM SERPL-SCNC: 144 MMOL/L (ref 136–145)
SP GR UR STRIP: 1.03 (ref 1–1.03)
T4 FREE SERPL-MCNC: 1.38 NG/DL (ref 0.93–1.7)
TRIGL SERPL-MCNC: 225 MG/DL (ref 0–150)
TSH SERPL DL<=0.005 MIU/L-ACNC: 0.09 UIU/ML (ref 0.27–4.2)
UROBILINOGEN UR STRIP-MCNC: ABNORMAL MG/DL
VLDLC SERPL CALC-MCNC: 40 MG/DL (ref 5–40)
WBC # BLD AUTO: 8.77 10*3/MM3 (ref 3.4–10.8)
WBC #/AREA URNS HPF: ABNORMAL /HPF

## 2023-03-22 RX ORDER — LEVOTHYROXINE SODIUM 0.1 MG/1
100 TABLET ORAL DAILY
Qty: 90 TABLET | Refills: 1 | Status: SHIPPED | OUTPATIENT
Start: 2023-03-22 | End: 2023-03-28 | Stop reason: SDUPTHER

## 2023-03-28 ENCOUNTER — OFFICE VISIT (OUTPATIENT)
Dept: INTERNAL MEDICINE | Facility: CLINIC | Age: 73
End: 2023-03-28
Payer: MEDICARE

## 2023-03-28 VITALS
OXYGEN SATURATION: 98 % | TEMPERATURE: 96.9 F | HEIGHT: 64 IN | SYSTOLIC BLOOD PRESSURE: 124 MMHG | BODY MASS INDEX: 28 KG/M2 | WEIGHT: 164 LBS | DIASTOLIC BLOOD PRESSURE: 76 MMHG | HEART RATE: 71 BPM

## 2023-03-28 DIAGNOSIS — Z00.00 ENCOUNTER FOR SUBSEQUENT ANNUAL WELLNESS VISIT (AWV) IN MEDICARE PATIENT: Primary | ICD-10-CM

## 2023-03-28 DIAGNOSIS — E78.2 MIXED HYPERLIPIDEMIA: ICD-10-CM

## 2023-03-28 DIAGNOSIS — E03.9 ACQUIRED HYPOTHYROIDISM: ICD-10-CM

## 2023-03-28 DIAGNOSIS — D12.6 TUBULAR ADENOMA OF COLON: ICD-10-CM

## 2023-03-28 DIAGNOSIS — H61.22 IMPACTED CERUMEN OF LEFT EAR: ICD-10-CM

## 2023-03-28 DIAGNOSIS — Z12.11 COLON CANCER SCREENING: ICD-10-CM

## 2023-03-28 RX ORDER — LEVOTHYROXINE SODIUM 0.1 MG/1
100 TABLET ORAL DAILY
Qty: 90 TABLET | Refills: 1 | Status: SHIPPED | OUTPATIENT
Start: 2023-03-28

## 2023-03-28 NOTE — PROGRESS NOTES
Procedure   Ear Cerumen Removal    Date/Time: 3/28/2023 12:34 PM  Performed by: Emma Patton APRN  Authorized by: Emma Patton APRN     Anesthesia:  Local Anesthetic: none  Location details: left ear  Patient tolerance: patient tolerated the procedure well with no immediate complications  Procedure type: irrigation   Sedation:  Patient sedated: no

## 2023-03-28 NOTE — PROGRESS NOTES
The ABCs of the Annual Wellness Visit  Subsequent Medicare Wellness Visit    Subjective      Zabrina Contreras is a 72 y.o. female who presents for a Subsequent Medicare Wellness Visit.    The following portions of the patient's history were reviewed and   updated as appropriate: allergies, current medications, past family history, past medical history, past social history, past surgical history and problem list.    Compared to one year ago, the patient feels her physical   health is the same.    Compared to one year ago, the patient feels her mental   health is the same.    Recent Hospitalizations:  She was not admitted to the hospital during the last year.       Current Medical Providers:  Patient Care Team:  Emma Patton APRN as PCP - General (Nurse Practitioner)  Jaime Garcia MD as Cardiologist (Cardiology)    Outpatient Medications Prior to Visit   Medication Sig Dispense Refill   • estradiol (ESTRACE) 1 MG tablet Take 1 tablet by mouth Daily. 90 tablet 3   • hyoscyamine (LEVSIN) 0.125 MG SL tablet Place 1 tablet under the tongue 2 (Two) Times a Day As Needed.     • levothyroxine (SYNTHROID, LEVOTHROID) 100 MCG tablet Take 1 tablet by mouth Daily. 90 tablet 1   • dicyclomine (BENTYL) 10 MG capsule Take 1 capsule by mouth 3 (Three) Times a Day. (Patient not taking: Reported on 3/28/2023) 270 capsule 3   • methylPREDNISolone (MEDROL) 4 MG dose pack Take as directed on package instructions. 21 tablet 0     No facility-administered medications prior to visit.       No opioid medication identified on active medication list. I have reviewed chart for other potential  high risk medication/s and harmful drug interactions in the elderly.          Aspirin is not on active medication list.  Aspirin use is not indicated based on review of current medical condition/s. Risk of harm outweighs potential benefits.  .    Patient Active Problem List   Diagnosis   • Irritable bowel syndrome without diarrhea   • History  "of colonic polyps   • Hypothyroidism   • Hypercholesterolemia   • Microcytic anemia   • Otalgia   • Palpitations   • Atypical chest pain   • Post-menopause   • BRBPR (bright red blood per rectum)   • Bloating   • Diarrhea   • Early satiety   • S/P cholecystectomy   • Bilateral lower abdominal cramping   • Upper abdominal pain   • Exposure to second hand tobacco smoke   • Chronic interstitial lung disease (HCC)     Advance Care Planning  Advance Directive is not on file.  ACP discussion was held with the patient during this visit. Patient does not have an advance directive, declines further assistance.     Objective    Vitals:    03/28/23 1017   BP: 124/76   BP Location: Left arm   Patient Position: Sitting   Cuff Size: Adult   Pulse: 71   Temp: 96.9 °F (36.1 °C)   TempSrc: Temporal   SpO2: 98%   Weight: 74.4 kg (164 lb)   Height: 162.6 cm (64\")   PainSc: 0-No pain     Estimated body mass index is 28.15 kg/m² as calculated from the following:    Height as of this encounter: 162.6 cm (64\").    Weight as of this encounter: 74.4 kg (164 lb).    BMI is >= 25 and <30. (Overweight) The following options were offered after discussion;: exercise counseling/recommendations and nutrition counseling/recommendations      Does the patient have evidence of cognitive impairment?   No    Lab Results   Component Value Date    CHLPL 232 (H) 03/21/2023    TRIG 225 (H) 03/21/2023    HDL 59 03/21/2023     (H) 03/21/2023    VLDL 40 03/21/2023        Physical Exam   Constitutional: She is oriented to person, place, and time.  Non-toxic appearance. She does not appear ill. No distress.   HENT:   Head: Normocephalic and atraumatic.   Mouth/Throat: Mucous membranes are moist. Oropharynx is clear.   Cardiovascular: Normal rate, regular rhythm, normal heart sounds and normal pulses.   Pulmonary/Chest: Effort normal and breath sounds normal. She has no wheezes. She has no rhonchi. She has no rales.   Abdominal: Soft. Bowel sounds are " normal. She exhibits no distension. There is no abdominal tenderness.   Musculoskeletal: Normal range of motion. No swelling or tenderness.      Cervical back: She exhibits no tenderness.   Neurological: She is alert and oriented to person, place, and time.   Skin: Skin is warm and dry. No rash noted. No erythema.   Psychiatric: Her behavior is normal. Mood, judgment and thought content normal.   Vitals reviewed.      HEALTH RISK ASSESSMENT    Smoking Status:  Social History     Tobacco Use   Smoking Status Former   • Packs/day: 1.00   • Years: 19.00   • Pack years: 19.00   • Types: Cigarettes   • Quit date:    • Years since quittin.2   Smokeless Tobacco Never   Tobacco Comments    Quit 25 years ago     Alcohol Consumption:  Social History     Substance and Sexual Activity   Alcohol Use Yes    Comment: occasional     Fall Risk Screen:    RODERICK Fall Risk Assessment was completed, and patient is at LOW risk for falls.Assessment completed on:3/28/2023    Depression Screening:  PHQ-2/PHQ-9 Depression Screening 3/28/2023   Little Interest or Pleasure in Doing Things 0-->not at all   Feeling Down, Depressed or Hopeless 0-->not at all   PHQ-9: Brief Depression Severity Measure Score 0       Health Habits and Functional and Cognitive Screening:  Functional & Cognitive Status 3/28/2023   Do you have difficulty preparing food and eating? No   Do you have difficulty bathing yourself, getting dressed or grooming yourself? No   Do you have difficulty using the toilet? No   Do you have difficulty moving around from place to place? No   Do you have trouble with steps or getting out of a bed or a chair? No   Current Diet Well Balanced Diet   Dental Exam Up to date   Eye Exam Up to date   Exercise (times per week) 3 times per week   Current Exercises Include Yard Work   Do you need help using the phone?  No   Are you deaf or do you have serious difficulty hearing?  No   Do you need help with transportation? No   Do you  need help shopping? No   Do you need help preparing meals?  No   Do you need help with housework?  No   Do you need help with laundry? No   Do you need help taking your medications? No   Do you need help managing money? No   Do you ever drive or ride in a car without wearing a seat belt? No   Have you felt unusual stress, anger or loneliness in the last month? No   Who do you live with? Alone   If you need help, do you have trouble finding someone available to you? No   Have you been bothered in the last four weeks by sexual problems? No   Do you have difficulty concentrating, remembering or making decisions? No       Age-appropriate Screening Schedule:  Refer to the list below for future screening recommendations based on patient's age, sex and/or medical conditions. Orders for these recommended tests are listed in the plan section. The patient has been provided with a written plan.    Health Maintenance   Topic Date Due   • TDAP/TD VACCINES (1 - Tdap) Never done   • ZOSTER VACCINE (1 of 2) Never done   • COVID-19 Vaccine (4 - Booster for Moderna series) 12/30/2021   • Pneumococcal Vaccine 65+ (2 - PPSV23 if available, else PCV20) 02/22/2022   • DXA SCAN  03/15/2023   • COLORECTAL CANCER SCREENING  06/10/2023   • LIPID PANEL  03/21/2024   • ANNUAL WELLNESS VISIT  03/28/2024   • MAMMOGRAM  06/24/2024   • HEPATITIS C SCREENING  Completed   • INFLUENZA VACCINE  Completed                CMS Preventative Services Quick Reference  Risk Factors Identified During Encounter:    Immunizations Discussed/Encouraged: Prevnar 20 (Pneumococcal 20-valent conjugate)  Inactivity/Sedentary: Patient was advised to exercise at least 150 minutes a week per CDC recommendations.    The above risks/problems have been discussed with the patient.  Pertinent information has been shared with the patient in the After Visit Summary.    Diagnoses and all orders for this visit:    1. Encounter for subsequent annual wellness visit (AWV) in Medicare  patient (Primary)    2. Acquired hypothyroidism  -     levothyroxine (SYNTHROID, LEVOTHROID) 100 MCG tablet; Take 1 tablet by mouth Daily.  Dispense: 90 tablet; Refill: 1    3. Colon cancer screening  -     Ambulatory Referral to Gastroenterology    4. Tubular adenoma of colon  -     Ambulatory Referral to Gastroenterology    5. Impacted cerumen of left ear  -     Cerumen Removal    6. Mixed hyperlipidemia    Other orders  -     hyoscyamine (LEVSIN) 0.125 MG SL tablet; Place 1 tablet under the tongue 2 (Two) Times a Day As Needed for Cramping.  Dispense: 60 tablet; Refill: 3      Patient presents to the office today for subsequent Medicare wellness exam.  She denies any acute concerns or complaints today.  In reviewing her labs, her renal function, electrolytes and liver function testing are within normal limits.  Her TSH is over suppressed with normal free T4.  Her levothyroxine has been decreased from 112 mcg to 100 mcg.  We will plan to reassess thyroid function testing in 6 weeks.    Her lipid panel does show improvement of LDL cholesterol from 151 in November 2022 to 133.  Triglycerides have worsened from 105-225.  She does indicate that she is on a low-carb/keto diet currently which does focus on high-protein and increased fat intake.  She has also been cutting out gluten and has been trying to intermittent fast.  She has been taking supplements niacin, milk this will and red yeast rice to help with her cholesterol as well.  She has not been very physically active but indicates with the weather warming up she will be outside more.    The patient's last colonoscopy was in 2020 at [facility] with did show polyps.  Recommendations were to repeat in 3 years.  Referral has been made back to gastroenterology.  Last mammogram was in June 2022, which was normal.  Patient indicates that she would only like to have this done every 2 years, which is appropriate.  Her last DEXA scan was in March 2021 which indicated  osteopenia.  She does not take medication for this and declines doing so at this time.  She does not believe that she wants to repeat DEXA scan.    Patient does follow with an eye doctor yearly.  Also follows with dermatology yearly.  She does not follow with a dentist regularly as she does have dentures.    Patient declines any vaccines today.    Follow Up:   Next Medicare Wellness visit to be scheduled in 1 year.      An After Visit Summary and PPPS were made available to the patient.

## 2023-04-03 RX ORDER — LEVOTHYROXINE SODIUM 112 UG/1
TABLET ORAL
Qty: 90 TABLET | Refills: 3 | Status: SHIPPED | OUTPATIENT
Start: 2023-04-03

## 2023-04-06 ENCOUNTER — PATIENT MESSAGE (OUTPATIENT)
Dept: INTERNAL MEDICINE | Facility: CLINIC | Age: 73
End: 2023-04-06
Payer: MEDICARE

## 2023-04-06 NOTE — TELEPHONE ENCOUNTER
From: Zabrina Contreras  To: Emma Patton  Sent: 4/6/2023 11:22 AM CDT  Subject: Prescription for Hyoscyamine    The script written for this was only written for 30 days. It is less expensive for me to write it for 90 days. Can this be rewritten? Thank you. Zabrina

## 2023-04-29 DIAGNOSIS — Z78.0 POST-MENOPAUSE: ICD-10-CM

## 2023-05-01 RX ORDER — ESTRADIOL 1 MG/1
TABLET ORAL
Qty: 90 TABLET | Refills: 3 | Status: SHIPPED | OUTPATIENT
Start: 2023-05-01

## 2023-06-13 ENCOUNTER — HOSPITAL ENCOUNTER (OUTPATIENT)
Age: 73
Setting detail: OUTPATIENT SURGERY
Discharge: HOME | End: 2023-06-13
Attending: INTERNAL MEDICINE | Admitting: INTERNAL MEDICINE
Payer: MEDICARE

## 2023-06-13 ENCOUNTER — APPOINTMENT (OUTPATIENT)
Dept: OPERATING ROOM | Age: 73
End: 2023-06-13
Attending: INTERNAL MEDICINE

## 2023-06-13 VITALS
TEMPERATURE: 96.8 F | OXYGEN SATURATION: 98 % | HEART RATE: 67 BPM | HEIGHT: 64 IN | RESPIRATION RATE: 16 BRPM | BODY MASS INDEX: 27.66 KG/M2 | SYSTOLIC BLOOD PRESSURE: 136 MMHG | DIASTOLIC BLOOD PRESSURE: 67 MMHG | WEIGHT: 162 LBS

## 2023-06-13 PROCEDURE — 45384 COLONOSCOPY W/LESION REMOVAL: CPT | Performed by: INTERNAL MEDICINE

## 2023-06-13 PROCEDURE — 45384 COLONOSCOPY W/LESION REMOVAL: CPT

## 2023-06-13 RX ORDER — SODIUM CHLORIDE, SODIUM LACTATE, POTASSIUM CHLORIDE, CALCIUM CHLORIDE 600; 310; 30; 20 MG/100ML; MG/100ML; MG/100ML; MG/100ML
INJECTION, SOLUTION INTRAVENOUS CONTINUOUS
Status: DISCONTINUED | OUTPATIENT
Start: 2023-06-13 | End: 2023-06-13 | Stop reason: HOSPADM

## 2023-06-13 RX ORDER — ONDANSETRON 2 MG/ML
4 INJECTION INTRAMUSCULAR; INTRAVENOUS
Status: CANCELLED | OUTPATIENT
Start: 2023-06-13 | End: 2023-06-14

## 2023-06-13 RX ADMIN — SODIUM CHLORIDE, SODIUM LACTATE, POTASSIUM CHLORIDE, CALCIUM CHLORIDE: 600; 310; 30; 20 INJECTION, SOLUTION INTRAVENOUS at 07:37

## 2023-06-13 ASSESSMENT — PAIN - FUNCTIONAL ASSESSMENT: PAIN_FUNCTIONAL_ASSESSMENT: NONE - DENIES PAIN

## 2023-06-13 NOTE — OP NOTE
Patient: Ken Guillermo: [id number]  [facility] Rec#: [id number] Acc#: [id number]   Primary Care Provider LASHAWN Peacock NP    Date of Procedure:  6/13/2023    Endoscopist: Molly De Los Santos MD, MD    Referring Provider: LASHAWN Peacock NP,     Operation Performed: Colonoscopy up to the terminal ileum with hot biopsy polypectomy of multiple polyps    Indications: Personal and family history of colon polyps; needs colorectal cancer surveillance. Anesthesia:  Sedation was administered by anesthesia who monitored the patient during the procedure. I met with Jcarlos Velasco prior to procedure. We discussed the procedure itself, and I have discussed the risks of endoscopy (including-- but not limited to-- pain, discomfort, bleeding potentially requiring second endoscopic procedure and/or blood transfusion, organ perforation requiring operative repair, damage to organs near the colon, infection, aspiration, cardiopulmonary/allergic reaction), benefits, indications to endoscopy. Additionally, we discussed options other than colonoscopy. The patient expressed understanding. All questions answered. The patient decided to proceed with the procedure. Signed informed consent was placed on the chart. Blood Loss: minimal    Withdrawal time: More than 9 minutes  Bowel Prep: Fair  with small amounts of thick solid and semisolid stool and a moderate amount of thick, opaque liquid scattered in patchy segments throughout the colon obscuring the underlying mucosa. Lesions including polyps may have been missed. Complications: no immediate complications    DESCRIPTION OF PROCEDURE:     A time out was performed. After written informed consent was obtained, the patient was placed in the left lateral position. The perianal area was inspected, and a digital rectal exam was performed. A rectal exam was performed: normal tone, no palpable lesions.  At this point, a forward viewing Olympus colonoscope was inserted

## 2023-06-13 NOTE — H&P
Patient Name: Reyes Avila  :   MRN: [id number]  DATE: 23    Allergies: Allergies   Allergen Reactions    Meperidine Other (See Comments)     Blood pressure drops, she is still able to use this. Other reaction(s): Unknown - High Severity    Codeine Nausea And Vomiting     Nausea, vomiting, dizziness        ENDOSCOPY  History and Physical    Procedure:    [] Diagnostic Colonoscopy       [x] Screening Colonoscopy  [] EGD      [] ERCP      [] EUS       [] Other    [x] Previous office notes/History and Physical reviewed from the patients chart. Please see EMR for further details of HPI. I have examined the patient's status immediately prior to the procedure and:      Indications/HPI:    []Abdominal Pain   []Cancer- GI/Lung     [x]Fhx of colon CA/polyps  []History of Polyps  []Barretts            []Melena  []Abnormal Imaging              []Dysphagia              []Persistent Pneumonia   []Anemia                            []Food Impaction        [x]History of Polyps  [] GI Bleed             []Pulmonary nodule/Mass   []Change in bowel habits []Heartburn/Reflux  []Rectal Bleed (BRBPR)  []Chest Pain - Non Cardiac []Heme (+) Stool []Ulcers  []Constipation  []Hemoptysis  []Varices  []Diarrhea  []Hypoxemia    []Nausea/Vomiting   []Screening   []Crohns/Colitis  []Other:     Anesthesia:   [x] MAC [] Moderate Sedation   [] General   [] None     ROS: 12 pt Review of Symptoms was negative unless mentioned above    Medications:   Prior to Admission medications    Medication Sig Start Date End Date Taking?  Authorizing Provider   Sod Picosulfate-Mag Ox-Cit Acd (CLENPIQ) 10-3.5-12 MG-GM -GM/160ML SOLN solution PT HAS INSTRUCTIONS 23   Noemy Mckeon MD   hyoscyamine (LEVSIN/SL) 125 MCG sublingual tablet Place 1 tablet under the tongue 2 times daily as needed for Cramping or Diarrhea 20   LASHAWN Peralta   dicyclomine (BENTYL) 10 MG capsule Take 1 capsule by mouth 4 times daily as needed

## 2023-06-13 NOTE — DISCHARGE INSTRUCTIONS
1. Repeat colonoscopy: pending pathology -with a strict 2-day clear liquid diet and a 2-day prep using Plenvu or a similar solution, in 3 years based on her personal and family history and colonoscopy findings today; sooner if her personal or family history as pertaining to colorectal cancer risk changes requiring an earlier exam or if the patient were to develop lower GI symptoms such as bleeding, abdominal pain, change in bowel habits or stool caliber or if the patient has anemia or unexplained weight loss in the future. 2. Await biopsy results-you will receive a letter with your results within 7-10 days    - Resume previous meds and diet  - GI clinic f/u PRN   - Keep scheduled f/u appts with other MDs     - NO ASA/NSAIDs x 2 weeks     POST-OP ORDERS: COLONOSCOPY:    1. Rest today. 2. DO NOT eat or drink until wide awake; eat your usual diet today in moderate amount only. 3. DO NOT drive today. 4. Call physician if you have severe pain, vomiting, fever, rectal bleeding or black bowel movements. 5.  If a biopsy was taken or a polyp removed, you should expect to hear results in about 21 days. If you have heard nothing from your physician by then, call the office for results. 6.  Discharge home when patient awake, vitals signs stable and tolerating liquids. 7. Call with questions or concerns [phone]. NSAIDS Non-steroidal Anti-Inflammatory  You have been directed by your physician to avoid NSAID's for 2 weeks; the following medications are a list of those to avoid. If you think that you are taking any NSAID's notify your physician.      Over The Counter  Advil                      Motrin  Nuprin                   Ibuprofen  Midol                     Aleve  Naproxen              Orudis  Aspirin                   Sade-Jennifer  Prescriptions and Generics  Cataflam              Relafen  Voltaren               Clinoril  Indocin                 Naproxen  Arthrotec              Lodine  Daypro

## 2023-07-26 DIAGNOSIS — H01.139 ATOPIC DERMATITIS OF EYELID, UNSPECIFIED LATERALITY: ICD-10-CM

## 2023-07-26 NOTE — TELEPHONE ENCOUNTER
Pt states mail order says this is on backorder, but can get at [place] [facility], so Rx is pended to this message.

## 2023-08-01 ENCOUNTER — TELEPHONE (OUTPATIENT)
Dept: INTERNAL MEDICINE | Facility: CLINIC | Age: 73
End: 2023-08-01
Payer: MEDICARE

## 2023-09-20 ENCOUNTER — OFFICE VISIT (OUTPATIENT)
Dept: INTERNAL MEDICINE | Facility: CLINIC | Age: 73
End: 2023-09-20
Payer: MEDICARE

## 2023-09-20 VITALS
SYSTOLIC BLOOD PRESSURE: 130 MMHG | TEMPERATURE: 97.2 F | HEIGHT: 64 IN | HEART RATE: 74 BPM | DIASTOLIC BLOOD PRESSURE: 78 MMHG | WEIGHT: 171 LBS | BODY MASS INDEX: 29.19 KG/M2 | OXYGEN SATURATION: 98 %

## 2023-09-20 DIAGNOSIS — L24.3 IRRITANT CONTACT DERMATITIS DUE TO COSMETICS: ICD-10-CM

## 2023-09-20 DIAGNOSIS — E03.9 ACQUIRED HYPOTHYROIDISM: Primary | ICD-10-CM

## 2023-09-20 PROCEDURE — 1159F MED LIST DOCD IN RCRD: CPT | Performed by: NURSE PRACTITIONER

## 2023-09-20 PROCEDURE — 1160F RVW MEDS BY RX/DR IN RCRD: CPT | Performed by: NURSE PRACTITIONER

## 2023-09-20 PROCEDURE — 99213 OFFICE O/P EST LOW 20 MIN: CPT | Performed by: NURSE PRACTITIONER

## 2023-09-20 NOTE — PROGRESS NOTES
Subjective     Chief Complaint:  Dermatitis around eyes    HPI:  Patient presents to the office today for a follow-up.  She was last seen in July with complaints of palpitations and a fluttering sensation in her chest.  Please see assessment and plan below.    Patient's PMR from outside medical facility reviewed and noted.    Past Medical History:   Past Medical History:   Diagnosis Date    Chest pain 1/6/2017    Disease of thyroid gland     Hyperlipidemia     Hypocholesteremia 1/6/2017    Hypothyroidism 1/6/2017    Microcytic anemia 1/6/2017    Otalgia 1/6/2017    Palpitations 1/6/2017     Past Surgical History:  Past Surgical History:   Procedure Laterality Date    APPENDECTOMY      CHOLECYSTECTOMY      HYSTERECTOMY      TONSILLECTOMY       Social History:  reports that she quit smoking about 38 years ago. She has a 19.00 pack-year smoking history. She has never used smokeless tobacco. She reports current alcohol use. She reports that she does not use drugs.    Family History: family history includes COPD in her mother; No Known Problems in her father.      Allergies:  Allergies   Allergen Reactions    Demerol  [Meperidine Hcl] Unknown - High Severity    Meperidine Other (See Comments)     Blood pressure drops, she is still able to use this.     Codeine Nausea And Vomiting     Nausea, vomiting, dizziness     Medications:  Prior to Admission medications    Medication Sig Start Date End Date Taking? Authorizing Provider   estradiol (ESTRACE) 1 MG tablet TAKE 1 TABLET EVERY DAY 5/1/23  Yes Emma Patton APRN   hydrocortisone 2.5 % cream Apply 1 application  topically to the appropriate area as directed 2 (Two) Times a Day. Apply around eyes 7/26/23  Yes Emma Patton APRN   hyoscyamine (LEVSIN) 0.125 MG SL tablet Place 1 tablet under the tongue 2 (Two) Times a Day As Needed for Cramping. 4/6/23  Yes Mansi Olmos APRN   levothyroxine (SYNTHROID, LEVOTHROID) 112 MCG tablet Take 1 tablet by  "mouth Daily. 7/13/23  Yes Emma Patton, APRN       Objective     Vital Signs: /78 (BP Location: Left arm, Patient Position: Sitting, Cuff Size: Adult)   Pulse 74   Temp 97.2 °F (36.2 °C) (Infrared)   Ht 162.6 cm (64.02\")   Wt 77.6 kg (171 lb)   LMP  (LMP Unknown)   SpO2 98%   BMI 29.34 kg/m²   Physical Exam  Vitals and nursing note reviewed.   Constitutional:       General: She is not in acute distress.     Appearance: She is not ill-appearing or toxic-appearing.   HENT:      Head: Normocephalic and atraumatic.      Mouth/Throat:      Mouth: Mucous membranes are moist.      Pharynx: Oropharynx is clear.   Cardiovascular:      Rate and Rhythm: Normal rate and regular rhythm.      Pulses: Normal pulses.      Heart sounds: Normal heart sounds.   Pulmonary:      Effort: Pulmonary effort is normal.      Breath sounds: No wheezing, rhonchi or rales.   Abdominal:      General: Bowel sounds are normal. There is no distension.      Palpations: Abdomen is soft.      Tenderness: There is no abdominal tenderness.   Musculoskeletal:         General: No swelling or tenderness. Normal range of motion.      Cervical back: Normal range of motion and neck supple. No tenderness.   Skin:     General: Skin is warm and dry.      Findings: No erythema or rash.   Neurological:      General: No focal deficit present.      Mental Status: She is alert and oriented to person, place, and time.   Psychiatric:         Mood and Affect: Mood normal.         Behavior: Behavior normal.         Thought Content: Thought content normal.         Judgment: Judgment normal.     Results Reviewed:  TSH Rfx On Abnormal To Free T4 (07/10/2023 09:54)     Assessment / Plan     Assessment/Plan:  Diagnoses and all orders for this visit:    1. Acquired hypothyroidism (Primary)    2. Irritant contact dermatitis due to cosmetics       Patient presents to the office today for a follow-up.  During her last office visit in June, she had been " complaining of palpitations and a fluttering sensation in her chest.  She had experienced this previously when taking too strong of a dose of levothyroxine.  Her TSH was actually found to be under suppressed at her last office visit and her Synthroid dosage was increased.  She indicates after several weeks, her palpitations and fluttering sensation in her chest have resolved.  She denies chest pain or shortness of breath.  She also tells me that she has been taking an iodine supplement.  We discussed taking medication appropriately, first thing in the morning on an empty stomach and separate from other medications.  The patient does intermittent fast and states that she has been taking this at night on an empty stomach.  We will reassess a TSH today.  We will follow-up with these results and make changes to plan of care as necessary.    During her previous office visit she had also complained of dry skin and redness around her eyes and swelling of her eyelids that was waxing and waning.  I felt as though she was likely experiencing some type of dermatitis and have prescribed hydrocortisone cream for her to use as needed.  The patient feels that she was experiencing an allergic contact dermatitis from a particular brand of eye shadow that she was using.  She has subsequently stopped using this and has noted some improvement.  She still has some dryness in the corners of her eyes but believes this is secondary to eyedrops.    Return in about 6 months (around 3/20/2024) for Medicare Wellness. unless patient needs to be seen sooner or acute issues arise.    I have discussed the patient results/orders and and plan/recommendation with them at today's visit.      LG Flood   09/20/2023      Answers submitted by the patient for this visit:  Primary Reason for Visit (Submitted on 9/13/2023)  What is the primary reason for your visit?: Physical

## 2024-04-01 ENCOUNTER — OFFICE VISIT (OUTPATIENT)
Dept: INTERNAL MEDICINE | Facility: CLINIC | Age: 74
End: 2024-04-01
Payer: MEDICARE

## 2024-04-01 VITALS
SYSTOLIC BLOOD PRESSURE: 132 MMHG | WEIGHT: 171.2 LBS | HEART RATE: 74 BPM | OXYGEN SATURATION: 98 % | TEMPERATURE: 97.8 F | BODY MASS INDEX: 29.23 KG/M2 | DIASTOLIC BLOOD PRESSURE: 76 MMHG | HEIGHT: 64 IN

## 2024-04-01 DIAGNOSIS — Z78.0 POST-MENOPAUSAL: ICD-10-CM

## 2024-04-01 DIAGNOSIS — E03.9 ACQUIRED HYPOTHYROIDISM: ICD-10-CM

## 2024-04-01 DIAGNOSIS — E78.2 MIXED HYPERLIPIDEMIA: ICD-10-CM

## 2024-04-01 DIAGNOSIS — Z79.899 ENCOUNTER FOR LONG-TERM CURRENT USE OF MEDICATION: ICD-10-CM

## 2024-04-01 DIAGNOSIS — Z12.31 ENCOUNTER FOR SCREENING MAMMOGRAM FOR MALIGNANT NEOPLASM OF BREAST: ICD-10-CM

## 2024-04-01 DIAGNOSIS — M85.80 OSTEOPENIA, UNSPECIFIED LOCATION: ICD-10-CM

## 2024-04-01 DIAGNOSIS — Z00.00 ENCOUNTER FOR SUBSEQUENT ANNUAL WELLNESS VISIT (AWV) IN MEDICARE PATIENT: Primary | ICD-10-CM

## 2024-04-01 DIAGNOSIS — H65.03 NON-RECURRENT ACUTE SEROUS OTITIS MEDIA OF BOTH EARS: ICD-10-CM

## 2024-04-01 RX ORDER — METHYLPREDNISOLONE 4 MG/1
TABLET ORAL
Qty: 21 TABLET | Refills: 0 | Status: SHIPPED | OUTPATIENT
Start: 2024-04-01

## 2024-04-01 NOTE — PROGRESS NOTES
The ABCs of the Annual Wellness Visit  Subsequent Medicare Wellness Visit    Subjective    Zabrina Contreras is a 73 y.o. female who presents for a Subsequent Medicare Wellness Visit.    The following portions of the patient's history were reviewed and   updated as appropriate: allergies, current medications, past family history, past medical history, past social history, past surgical history, and problem list.    Compared to one year ago, the patient feels her physical   health is the same.    Compared to one year ago, the patient feels her mental   health is the same.    Recent Hospitalizations:  She was not admitted to the hospital during the last year.       Current Medical Providers:  Patient Care Team:  Emma Patton APRN as PCP - General (Nurse Practitioner)  Jaime Garcia MD as Cardiologist (Cardiology)    Outpatient Medications Prior to Visit   Medication Sig Dispense Refill    estradiol (ESTRACE) 1 MG tablet TAKE 1 TABLET EVERY DAY 90 tablet 3    hydrocortisone 2.5 % cream Apply 1 application  topically to the appropriate area as directed 2 (Two) Times a Day. Apply around eyes 453.6 g 2    hyoscyamine (LEVSIN) 0.125 MG SL tablet Place 1 tablet under the tongue 2 (Two) Times a Day As Needed for Cramping. 180 tablet 1    levothyroxine (SYNTHROID, LEVOTHROID) 112 MCG tablet Take 1 tablet by mouth Daily. 90 tablet 3     No facility-administered medications prior to visit.       No opioid medication identified on active medication list. I have reviewed chart for other potential  high risk medication/s and harmful drug interactions in the elderly.        Aspirin is not on active medication list.  Aspirin use is not indicated based on review of current medical condition/s. Risk of harm outweighs potential benefits.  .    Patient Active Problem List   Diagnosis    Irritable bowel syndrome without diarrhea    History of colonic polyps    Hypothyroidism    Hypercholesterolemia    Microcytic anemia     "Otalgia    Palpitations    Atypical chest pain    Post-menopause    BRBPR (bright red blood per rectum)    Bloating    Diarrhea    Early satiety    S/P cholecystectomy    Bilateral lower abdominal cramping    Upper abdominal pain    Exposure to second hand tobacco smoke    Chronic interstitial lung disease     Advance Care Planning   Advance Care Planning     Advance Directive is not on file.  ACP discussion was held with the patient during this visit. Patient does not have an advance directive, declines further assistance.     Objective    Vitals:    24 1020   BP: 132/76   BP Location: Left arm   Patient Position: Sitting   Cuff Size: Adult   Pulse: 74   Temp: 97.8 °F (36.6 °C)   TempSrc: Temporal   SpO2: 98%   Weight: 77.7 kg (171 lb 3.2 oz)   Height: 162.6 cm (64.02\")   PainSc: 0-No pain     Estimated body mass index is 29.37 kg/m² as calculated from the following:    Height as of this encounter: 162.6 cm (64.02\").    Weight as of this encounter: 77.7 kg (171 lb 3.2 oz).    BMI is >= 25 and <30. (Overweight) The following options were offered after discussion;: exercise counseling/recommendations and nutrition counseling/recommendations      Does the patient have evidence of cognitive impairment? No          HEALTH RISK ASSESSMENT    Smoking Status:  Social History     Tobacco Use   Smoking Status Former    Current packs/day: 0.00    Average packs/day: 1 pack/day for 19.0 years (19.0 ttl pk-yrs)    Types: Cigarettes    Start date:     Quit date:     Years since quittin.2   Smokeless Tobacco Never   Tobacco Comments    Quit 25 years ago     Alcohol Consumption:  Social History     Substance and Sexual Activity   Alcohol Use Yes    Comment: occasional     Fall Risk Screen:    STEADI Fall Risk Assessment was completed, and patient is at LOW risk for falls.Assessment completed on:2024    Depression Screenin/1/2024    10:26 AM   PHQ-2/PHQ-9 Depression Screening   Little Interest or " Pleasure in Doing Things 0-->not at all   Feeling Down, Depressed or Hopeless 0-->not at all   PHQ-9: Brief Depression Severity Measure Score 0       Health Habits and Functional and Cognitive Screenin/1/2024    10:24 AM   Functional & Cognitive Status   Do you have difficulty preparing food and eating? No   Do you have difficulty bathing yourself, getting dressed or grooming yourself? No   Do you have difficulty using the toilet? No   Do you have difficulty moving around from place to place? No   Do you have trouble with steps or getting out of a bed or a chair? No   Current Diet Well Balanced Diet   Dental Exam Up to date   Eye Exam Up to date   Exercise (times per week) 1 times per week   Current Exercises Include Light Weights;Aerobics   Do you need help using the phone?  No   Are you deaf or do you have serious difficulty hearing?  No   Do you need help to go to places out of walking distance? No   Do you need help shopping? No   Do you need help preparing meals?  No   Do you need help with housework?  No   Do you need help with laundry? No   Do you need help taking your medications? No   Do you need help managing money? No   Do you ever drive or ride in a car without wearing a seat belt? No   Have you felt unusual stress, anger or loneliness in the last month? No   Who do you live with? Alone   If you need help, do you have trouble finding someone available to you? No   Have you been bothered in the last four weeks by sexual problems? No   Do you have difficulty concentrating, remembering or making decisions? No       Age-appropriate Screening Schedule:  Refer to the list below for future screening recommendations based on patient's age, sex and/or medical conditions. Orders for these recommended tests are listed in the plan section. The patient has been provided with a written plan.    Health Maintenance   Topic Date Due    DXA SCAN  03/15/2023    BMI FOLLOWUP  2024    ZOSTER VACCINE (1 of 2)  "04/01/2024 (Originally 8/30/1998)    COVID-19 Vaccine (4 - 2023-24 season) 04/03/2024 (Originally 9/1/2023)    RSV Vaccine - Adults (1 - 1-dose 60+ series) 04/01/2025 (Originally 8/30/2008)    Pneumococcal Vaccine 65+ (2 of 2 - PPSV23 or PCV20) 04/01/2025 (Originally 4/19/2021)    TDAP/TD VACCINES (1 - Tdap) 04/01/2025 (Originally 8/30/1969)    LIPID PANEL  07/10/2024    INFLUENZA VACCINE  08/01/2024    ANNUAL WELLNESS VISIT  04/01/2025    COLORECTAL CANCER SCREENING  06/13/2026    HEPATITIS C SCREENING  Completed                  CMS Preventative Services Quick Reference  Risk Factors Identified During Encounter  Immunizations Discussed/Encouraged: Prevnar 20 (Pneumococcal 20-valent conjugate), Shingrix, COVID19, and RSV (Respiratory Syncytial Virus)  The above risks/problems have been discussed with the patient.  Pertinent information has been shared with the patient in the After Visit Summary.  An After Visit Summary and PPPS were made available to the patient.    Follow Up:   Next Medicare Wellness visit to be scheduled in 1 year.       Additional E&M Note during same encounter follows:  Patient has multiple medical problems which are significant and separately identifiable that require additional work above and beyond the Medicare Wellness Visit.      Chief Complaint  Medicare Wellness-subsequent (Pneumo vac) and Ear Fullness (R ear, feels like fluid in it.)    Subjective        HPI  Zabrina Contreras is also being seen today for complaints of right ear fullness.  She has had no fever or chills.  She denies any sinus drainage worse than baseline.  She does take Claritin and Flonase on a daily basis.  She does not necessarily note any decreased hearing.  Denies drainage from her ear.         Objective   Vital Signs:  /76 (BP Location: Left arm, Patient Position: Sitting, Cuff Size: Adult)   Pulse 74   Temp 97.8 °F (36.6 °C) (Temporal)   Ht 162.6 cm (64.02\")   Wt 77.7 kg (171 lb 3.2 oz)   SpO2 98%   BMI " 29.37 kg/m²     Physical Exam  Vitals and nursing note reviewed.   Constitutional:       General: She is not in acute distress.     Appearance: She is not ill-appearing or toxic-appearing.   HENT:      Head: Normocephalic and atraumatic.      Right Ear: A middle ear effusion is present. Tympanic membrane has decreased mobility.      Left Ear: A middle ear effusion is present. Tympanic membrane has decreased mobility.      Mouth/Throat:      Mouth: Mucous membranes are moist.      Pharynx: Oropharynx is clear.   Cardiovascular:      Rate and Rhythm: Normal rate and regular rhythm.      Pulses: Normal pulses.      Heart sounds: Murmur heard.       with a grade of 2/6.   Pulmonary:      Effort: Pulmonary effort is normal.      Breath sounds: No wheezing, rhonchi or rales.   Abdominal:      General: Bowel sounds are normal. There is no distension.      Palpations: Abdomen is soft.      Tenderness: There is no abdominal tenderness.   Musculoskeletal:         General: No swelling or tenderness. Normal range of motion.      Cervical back: Normal range of motion and neck supple. No tenderness.   Skin:     General: Skin is warm and dry.      Findings: No erythema or rash.   Neurological:      General: No focal deficit present.      Mental Status: She is alert and oriented to person, place, and time.   Psychiatric:         Mood and Affect: Mood normal.         Behavior: Behavior normal.         Thought Content: Thought content normal.         Judgment: Judgment normal.          The following data was reviewed by: LG Flood on 04/01/2024:  Data reviewed : Radiologic studies last mammogram from June 2022 was negative.  Last DEXA scan from 2021 showed osteopenia with a T-score of -1.5 and GI studies colonoscopy in June 2023 did show evidence of polyps.  Recommendations to repeat in 3 years.           Assessment and Plan   Diagnoses and all orders for this visit:    1. Encounter for subsequent annual wellness visit  (AWV) in Medicare patient (Primary)    2. Acquired hypothyroidism  -     TSH Rfx On Abnormal To Free T4    3. Encounter for long-term current use of medication  -     CBC & Differential  -     Comprehensive Metabolic Panel  -     Urinalysis With Microscopic - Urine, Clean Catch    4. Mixed hyperlipidemia  -     Comprehensive Metabolic Panel  -     Lipid Panel    5. Encounter for screening mammogram for malignant neoplasm of breast  -     Mammo Screening Digital Tomosynthesis Bilateral With CAD; Future    6. Post-menopausal  -     DEXA Bone Density Axial; Future    7. Osteopenia, unspecified location  -     DEXA Bone Density Axial; Future    8. Non-recurrent acute serous otitis media of both ears  -     methylPREDNISolone (MEDROL) 4 MG dose pack; Take as directed on package instructions.  Dispense: 21 tablet; Refill: 0      Patient presents to the office today for subsequent Medicare wellness exam.  She will have yearly fasting labs drawn today as above.  Will follow-up with these results and make changes to plan of care as necessary.    Patient is also being seen today for complaints of right ear fullness.  She has difficulty with seasonal allergies and states that when her allergies are acting up it does make her tired.  She has had no fever or chills.  She denies any sinus drainage worse than baseline.  She does take Claritin and Flonase on a daily basis.  She does not necessarily note any decreased hearing.  Denies drainage from her ear.  She actually has serous effusions noted bilaterally.  Would recommend for her to continue Claritin and Flonase and will add a Medrol Dosepak.  I have asked her to let us know if her symptoms worsen or do not improve with the Medrol Dosepak, at which point she may benefit from antibiotics.  We will hold on this for now.    Patient's last mammogram was in June 2022 which was negative.  She elects to have these performed every 2 years, which is still appropriate per current  guidelines.  Repeat mammogram has been ordered.  Last DEXA scan was in 2021 which did show osteopenia.  She is not taking calcium/vitamin D or a multivitamin at this time.  She does perform weightbearing exercises.  She is agreeable to repeat DEXA scan, which has been ordered.    She is up-to-date on colonoscopy, last performed in June 2023.  There were polyps noted with recommendations to repeat in 3 years.  She does not qualify for low-dose chest CT for lung cancer screening as she quit smoking greater than 15 years ago.    Patient does follow with the eye doctor on a regular basis, and has an upcoming appointment.  She does not need to follow with a dentist as she has upper and lower dentures.  She does follow with a dermatologist yearly.    Patient declines any vaccines at this time.     I spent 40 minutes caring for Zabrina on this date of service. This time includes time spent by me in the following activities:preparing for the visit, reviewing tests, obtaining and/or reviewing a separately obtained history, performing a medically appropriate examination and/or evaluation , counseling and educating the patient/family/caregiver, ordering medications, tests, or procedures, and documenting information in the medical record  Follow Up   Return in about 6 months (around 10/1/2024) for Recheck.  Patient was given instructions and counseling regarding her condition or for health maintenance advice. Please see specific information pulled into the AVS if appropriate.

## 2024-04-02 LAB
ALBUMIN SERPL-MCNC: 4.2 G/DL (ref 3.5–5.2)
ALBUMIN/GLOB SERPL: 1.6 G/DL
ALP SERPL-CCNC: 70 U/L (ref 39–117)
ALT SERPL-CCNC: 16 U/L (ref 1–33)
APPEARANCE UR: ABNORMAL
AST SERPL-CCNC: 14 U/L (ref 1–32)
BACTERIA #/AREA URNS HPF: ABNORMAL /HPF
BASOPHILS # BLD AUTO: 0.09 10*3/MM3 (ref 0–0.2)
BASOPHILS NFR BLD AUTO: 0.9 % (ref 0–1.5)
BILIRUB SERPL-MCNC: 0.6 MG/DL (ref 0–1.2)
BILIRUB UR QL STRIP: NEGATIVE
BUN SERPL-MCNC: 11 MG/DL (ref 8–23)
BUN/CREAT SERPL: 17.2 (ref 7–25)
CALCIUM SERPL-MCNC: 9.2 MG/DL (ref 8.6–10.5)
CASTS URNS MICRO: ABNORMAL
CHLORIDE SERPL-SCNC: 102 MMOL/L (ref 98–107)
CHOLEST SERPL-MCNC: 260 MG/DL (ref 0–200)
CO2 SERPL-SCNC: 26.4 MMOL/L (ref 22–29)
COLOR UR: YELLOW
CREAT SERPL-MCNC: 0.64 MG/DL (ref 0.57–1)
EGFRCR SERPLBLD CKD-EPI 2021: 92.3 ML/MIN/1.73
EOSINOPHIL # BLD AUTO: 0.39 10*3/MM3 (ref 0–0.4)
EOSINOPHIL NFR BLD AUTO: 4 % (ref 0.3–6.2)
EPI CELLS #/AREA URNS HPF: ABNORMAL /HPF
ERYTHROCYTE [DISTWIDTH] IN BLOOD BY AUTOMATED COUNT: 13.4 % (ref 12.3–15.4)
GLOBULIN SER CALC-MCNC: 2.7 GM/DL
GLUCOSE SERPL-MCNC: 87 MG/DL (ref 65–99)
GLUCOSE UR QL STRIP: NEGATIVE
HCT VFR BLD AUTO: 44.7 % (ref 34–46.6)
HDLC SERPL-MCNC: 44 MG/DL (ref 40–60)
HGB BLD-MCNC: 14 G/DL (ref 12–15.9)
HGB UR QL STRIP: NEGATIVE
IMM GRANULOCYTES # BLD AUTO: 0.02 10*3/MM3 (ref 0–0.05)
IMM GRANULOCYTES NFR BLD AUTO: 0.2 % (ref 0–0.5)
KETONES UR QL STRIP: NEGATIVE
LDLC SERPL CALC-MCNC: 190 MG/DL (ref 0–100)
LEUKOCYTE ESTERASE UR QL STRIP: NEGATIVE
LYMPHOCYTES # BLD AUTO: 2.86 10*3/MM3 (ref 0.7–3.1)
LYMPHOCYTES NFR BLD AUTO: 29.5 % (ref 19.6–45.3)
MCH RBC QN AUTO: 25.6 PG (ref 26.6–33)
MCHC RBC AUTO-ENTMCNC: 31.3 G/DL (ref 31.5–35.7)
MCV RBC AUTO: 81.9 FL (ref 79–97)
MONOCYTES # BLD AUTO: 1 10*3/MM3 (ref 0.1–0.9)
MONOCYTES NFR BLD AUTO: 10.3 % (ref 5–12)
NEUTROPHILS # BLD AUTO: 5.33 10*3/MM3 (ref 1.7–7)
NEUTROPHILS NFR BLD AUTO: 55.1 % (ref 42.7–76)
NITRITE UR QL STRIP: NEGATIVE
NRBC BLD AUTO-RTO: 0 /100 WBC (ref 0–0.2)
PH UR STRIP: 5.5 [PH] (ref 5–8)
PLATELET # BLD AUTO: 409 10*3/MM3 (ref 140–450)
POTASSIUM SERPL-SCNC: 4.6 MMOL/L (ref 3.5–5.2)
PROT SERPL-MCNC: 6.9 G/DL (ref 6–8.5)
PROT UR QL STRIP: ABNORMAL
RBC # BLD AUTO: 5.46 10*6/MM3 (ref 3.77–5.28)
RBC #/AREA URNS HPF: ABNORMAL /HPF
SODIUM SERPL-SCNC: 139 MMOL/L (ref 136–145)
SP GR UR STRIP: ABNORMAL (ref 1–1.03)
T4 FREE SERPL-MCNC: 1.64 NG/DL (ref 0.93–1.7)
TRIGL SERPL-MCNC: 141 MG/DL (ref 0–150)
TSH SERPL DL<=0.005 MIU/L-ACNC: 0.14 UIU/ML (ref 0.27–4.2)
UROBILINOGEN UR STRIP-MCNC: ABNORMAL MG/DL
VLDLC SERPL CALC-MCNC: 26 MG/DL (ref 5–40)
WBC # BLD AUTO: 9.69 10*3/MM3 (ref 3.4–10.8)
WBC #/AREA URNS HPF: ABNORMAL /HPF

## 2024-04-02 NOTE — PROGRESS NOTES
Lipid panel shows elevation of cholesterol. Would recommend dietary changes to include decreasing intake of red meat and increasing lean meat intake such as chicken, fish, turkey.  Would also recommend to increase healthy fats such as avocado, olive/olive oil, nuts/seeds.  Recommend decreasing processed/packaged foods.  I believe that the patient is likely still eating a keto style diet, which would explain some of her cholesterol findings.  She has refused to take medication for this in the past.  Her thyroid function would indicate that her medication may be too strong, so I would like to decrease her medication dosage if she is agreeable to this.  I would decrease this to 100 mcg and have her come back to the office in 6 weeks just to have labs rechecked.

## 2024-04-23 RX ORDER — AZELASTINE 1 MG/ML
2 SPRAY, METERED NASAL 2 TIMES DAILY
Qty: 30 ML | Refills: 1 | Status: SHIPPED | OUTPATIENT
Start: 2024-04-23

## 2024-05-01 DIAGNOSIS — E78.00 HYPERCHOLESTEROLEMIA: ICD-10-CM

## 2024-05-01 DIAGNOSIS — E03.9 ACQUIRED HYPOTHYROIDISM: Primary | ICD-10-CM

## 2024-05-01 RX ORDER — LEVOTHYROXINE SODIUM 0.1 MG/1
100 TABLET ORAL DAILY
Qty: 30 TABLET | Refills: 0 | Status: SHIPPED | OUTPATIENT
Start: 2024-05-01

## 2024-05-01 NOTE — TELEPHONE ENCOUNTER
Notified patient of information below. Patient voiced understanding.     Patient states she has been taking 112 mcg daily - states she was unaware to start 100 mcg from lab results from 4/1.   Per LG [place] patient will need to start the lower dosage and come back in 6 weeks to have the TSH rechecked.   Patient notified and she voiced understanding.   TSH lab ordered.

## 2024-05-09 ENCOUNTER — OFFICE VISIT (OUTPATIENT)
Dept: INTERNAL MEDICINE | Facility: CLINIC | Age: 74
End: 2024-05-09
Payer: MEDICARE

## 2024-05-09 VITALS
WEIGHT: 175 LBS | HEART RATE: 79 BPM | SYSTOLIC BLOOD PRESSURE: 142 MMHG | TEMPERATURE: 97.6 F | HEIGHT: 64 IN | OXYGEN SATURATION: 96 % | DIASTOLIC BLOOD PRESSURE: 80 MMHG | BODY MASS INDEX: 29.88 KG/M2

## 2024-05-09 DIAGNOSIS — H66.001 NON-RECURRENT ACUTE SUPPURATIVE OTITIS MEDIA OF RIGHT EAR WITHOUT SPONTANEOUS RUPTURE OF TYMPANIC MEMBRANE: Primary | ICD-10-CM

## 2024-05-09 PROCEDURE — 1160F RVW MEDS BY RX/DR IN RCRD: CPT | Performed by: NURSE PRACTITIONER

## 2024-05-09 PROCEDURE — 99213 OFFICE O/P EST LOW 20 MIN: CPT | Performed by: NURSE PRACTITIONER

## 2024-05-09 PROCEDURE — 1125F AMNT PAIN NOTED PAIN PRSNT: CPT | Performed by: NURSE PRACTITIONER

## 2024-05-09 PROCEDURE — 1159F MED LIST DOCD IN RCRD: CPT | Performed by: NURSE PRACTITIONER

## 2024-05-09 RX ORDER — AMOXICILLIN AND CLAVULANATE POTASSIUM 875; 125 MG/1; MG/1
1 TABLET, FILM COATED ORAL 2 TIMES DAILY
Qty: 20 TABLET | Refills: 0 | Status: SHIPPED | OUTPATIENT
Start: 2024-05-09 | End: 2024-05-19

## 2024-05-12 LAB
NCCN CRITERIA FLAG: ABNORMAL
TYRER CUZICK SCORE: 1.2

## 2024-05-23 RX ORDER — LEVOTHYROXINE SODIUM 0.1 MG/1
100 TABLET ORAL DAILY
Qty: 30 TABLET | Refills: 11 | OUTPATIENT
Start: 2024-05-23

## 2024-06-11 ENCOUNTER — HOSPITAL ENCOUNTER (OUTPATIENT)
Dept: MAMMOGRAPHY | Facility: HOSPITAL | Age: 74
Discharge: HOME | End: 2024-06-11
Payer: MEDICARE

## 2024-06-11 ENCOUNTER — HOSPITAL ENCOUNTER (OUTPATIENT)
Dept: BONE DENSITY | Facility: HOSPITAL | Age: 74
Discharge: HOME | End: 2024-06-11
Payer: MEDICARE

## 2024-06-11 DIAGNOSIS — M85.80 OSTEOPENIA, UNSPECIFIED LOCATION: ICD-10-CM

## 2024-06-11 DIAGNOSIS — Z78.0 POST-MENOPAUSAL: ICD-10-CM

## 2024-06-11 DIAGNOSIS — Z12.31 ENCOUNTER FOR SCREENING MAMMOGRAM FOR MALIGNANT NEOPLASM OF BREAST: ICD-10-CM

## 2024-06-11 PROCEDURE — 77063 BREAST TOMOSYNTHESIS BI: CPT

## 2024-06-11 PROCEDURE — 77080 DXA BONE DENSITY AXIAL: CPT

## 2024-06-11 PROCEDURE — 77067 SCR MAMMO BI INCL CAD: CPT

## 2024-06-11 NOTE — PROGRESS NOTES
DEXA scan does show osteopenia, which has worsened slightly since her last scan in 2021.  At the very least I would recommend an over-the-counter calcium/vitamin D supplement such as Caltrate.  I cannot limber exactly if she has had trouble with taking the supplements in the past if it caused any type of side effects but if not I would recommend starting this.

## 2024-06-14 RX ORDER — LEVOTHYROXINE SODIUM 0.1 MG/1
100 TABLET ORAL DAILY
Qty: 90 TABLET | Refills: 1 | Status: SHIPPED | OUTPATIENT
Start: 2024-06-14

## 2024-06-14 NOTE — TELEPHONE ENCOUNTER
DELETE AFTER REVIEWING: Telephone encounter to be sent to the clinical pool    Name: Zabrina Contreras    Relationship: Self    Best Callback Number: [phone]    HUB PROVIDED THE RELAY MESSAGE FROM THE OFFICE   PATIENT HAS FURTHER QUESTIONS AND WOULD LIKE A CALL BACK AT THE FOLLOWING PHONE NUMBER [phone]    ADDITIONAL INFORMATION: STATE THAT PRESCRIPTION LECOTHYROXINE IS TO BE SENT TO [facility] FOR 90 DAYS, NOT 30, CURRENTLY NEEDS A FILL SENT, HAS 3 DAYS OR MORE.

## 2024-06-14 NOTE — TELEPHONE ENCOUNTER
"Relay     \"It appears that Stacey told her to reduce levothyroxine to 100 mcg daily.  TSH has returned to normal.  Continue at current dose. \"                "

## 2024-06-14 NOTE — TELEPHONE ENCOUNTER
----- Message from Jenny Wu sent at 6/12/2024 11:49 AM CDT -----  This is Stacey's patient.  It appears that Stacey told her to reduce levothyroxine to 100 mcg daily.  TSH has returned to normal.  Continue at current dose.

## 2024-07-15 DIAGNOSIS — Z78.0 POST-MENOPAUSE: ICD-10-CM

## 2024-07-15 RX ORDER — ESTRADIOL 1 MG/1
1 TABLET ORAL DAILY
Qty: 10 TABLET | Refills: 0 | Status: SHIPPED | OUTPATIENT
Start: 2024-07-15

## 2024-07-15 RX ORDER — ESTRADIOL 1 MG/1
1 TABLET ORAL DAILY
Qty: 90 TABLET | Refills: 3 | Status: SHIPPED | OUTPATIENT
Start: 2024-07-15

## 2024-09-04 ENCOUNTER — APPOINTMENT (OUTPATIENT)
Dept: GENERAL RADIOLOGY | Facility: HOSPITAL | Age: 74
End: 2024-09-04
Payer: MEDICARE

## 2024-09-04 ENCOUNTER — APPOINTMENT (OUTPATIENT)
Dept: CT IMAGING | Facility: HOSPITAL | Age: 74
End: 2024-09-04
Payer: MEDICARE

## 2024-09-04 ENCOUNTER — HOSPITAL ENCOUNTER (OUTPATIENT)
Facility: HOSPITAL | Age: 74
Setting detail: OBSERVATION
Discharge: HOME | End: 2024-09-05
Attending: FAMILY MEDICINE | Admitting: INTERNAL MEDICINE
Payer: MEDICARE

## 2024-09-04 DIAGNOSIS — Z74.09 IMPAIRED MOBILITY: ICD-10-CM

## 2024-09-04 DIAGNOSIS — I63.9 ACUTE ISCHEMIC STROKE: ICD-10-CM

## 2024-09-04 DIAGNOSIS — R53.1 RIGHT SIDED WEAKNESS: Primary | ICD-10-CM

## 2024-09-04 DIAGNOSIS — R00.2 PALPITATIONS: ICD-10-CM

## 2024-09-04 DIAGNOSIS — R47.1 DYSARTHRIA: ICD-10-CM

## 2024-09-04 LAB
ALBUMIN SERPL-MCNC: 4.1 G/DL (ref 3.5–5.2)
ALBUMIN/GLOB SERPL: 1.2 G/DL
ALP SERPL-CCNC: 95 U/L (ref 39–117)
ALT SERPL W P-5'-P-CCNC: 13 U/L (ref 1–33)
ANION GAP SERPL CALCULATED.3IONS-SCNC: 12 MMOL/L (ref 5–15)
APTT PPP: 25.7 SECONDS (ref 24.5–36)
AST SERPL-CCNC: 16 U/L (ref 1–32)
BASOPHILS # BLD AUTO: 0.09 10*3/MM3 (ref 0–0.2)
BASOPHILS NFR BLD AUTO: 0.8 % (ref 0–1.5)
BILIRUB SERPL-MCNC: 0.4 MG/DL (ref 0–1.2)
BUN SERPL-MCNC: 12 MG/DL (ref 8–23)
BUN/CREAT SERPL: 15.8 (ref 7–25)
CALCIUM SPEC-SCNC: 9.2 MG/DL (ref 8.6–10.5)
CHLORIDE SERPL-SCNC: 103 MMOL/L (ref 98–107)
CO2 SERPL-SCNC: 24 MMOL/L (ref 22–29)
CREAT SERPL-MCNC: 0.76 MG/DL (ref 0.57–1)
DEPRECATED RDW RBC AUTO: 41.8 FL (ref 37–54)
EGFRCR SERPLBLD CKD-EPI 2021: 81.3 ML/MIN/1.73
EOSINOPHIL # BLD AUTO: 0.37 10*3/MM3 (ref 0–0.4)
EOSINOPHIL NFR BLD AUTO: 3.5 % (ref 0.3–6.2)
ERYTHROCYTE [DISTWIDTH] IN BLOOD BY AUTOMATED COUNT: 14.5 % (ref 12.3–15.4)
GLOBULIN UR ELPH-MCNC: 3.4 GM/DL
GLUCOSE SERPL-MCNC: 91 MG/DL (ref 65–99)
HCT VFR BLD AUTO: 45.7 % (ref 34–46.6)
HGB BLD-MCNC: 14.4 G/DL (ref 12–15.9)
HOLD SPECIMEN: NORMAL
IMM GRANULOCYTES # BLD AUTO: 0.03 10*3/MM3 (ref 0–0.05)
IMM GRANULOCYTES NFR BLD AUTO: 0.3 % (ref 0–0.5)
INR PPP: 0.93 (ref 0.91–1.09)
LYMPHOCYTES # BLD AUTO: 3.53 10*3/MM3 (ref 0.7–3.1)
LYMPHOCYTES NFR BLD AUTO: 33.1 % (ref 19.6–45.3)
MCH RBC QN AUTO: 25.3 PG (ref 26.6–33)
MCHC RBC AUTO-ENTMCNC: 31.5 G/DL (ref 31.5–35.7)
MCV RBC AUTO: 80.3 FL (ref 79–97)
MONOCYTES # BLD AUTO: 0.93 10*3/MM3 (ref 0.1–0.9)
MONOCYTES NFR BLD AUTO: 8.7 % (ref 5–12)
NEUTROPHILS NFR BLD AUTO: 5.71 10*3/MM3 (ref 1.7–7)
NEUTROPHILS NFR BLD AUTO: 53.6 % (ref 42.7–76)
NRBC BLD AUTO-RTO: 0 /100 WBC (ref 0–0.2)
PLATELET # BLD AUTO: 388 10*3/MM3 (ref 140–450)
PMV BLD AUTO: 10.1 FL (ref 6–12)
POTASSIUM SERPL-SCNC: 3.8 MMOL/L (ref 3.5–5.2)
PROT SERPL-MCNC: 7.5 G/DL (ref 6–8.5)
PROTHROMBIN TIME: 12.8 SECONDS (ref 11.8–14.8)
RBC # BLD AUTO: 5.69 10*6/MM3 (ref 3.77–5.28)
SODIUM SERPL-SCNC: 139 MMOL/L (ref 136–145)
TROPONIN T SERPL HS-MCNC: <6 NG/L
TSH SERPL DL<=0.05 MIU/L-ACNC: 2.23 UIU/ML (ref 0.27–4.2)
WBC NRBC COR # BLD AUTO: 10.66 10*3/MM3 (ref 3.4–10.8)
WHOLE BLOOD HOLD COAG: NORMAL
WHOLE BLOOD HOLD SPECIMEN: NORMAL

## 2024-09-04 PROCEDURE — 85025 COMPLETE CBC W/AUTO DIFF WBC: CPT

## 2024-09-04 PROCEDURE — 70450 CT HEAD/BRAIN W/O DYE: CPT

## 2024-09-04 PROCEDURE — 70498 CT ANGIOGRAPHY NECK: CPT

## 2024-09-04 PROCEDURE — 85610 PROTHROMBIN TIME: CPT

## 2024-09-04 PROCEDURE — 85730 THROMBOPLASTIN TIME PARTIAL: CPT

## 2024-09-04 PROCEDURE — 93005 ELECTROCARDIOGRAM TRACING: CPT

## 2024-09-04 PROCEDURE — 80053 COMPREHEN METABOLIC PANEL: CPT

## 2024-09-04 PROCEDURE — 70496 CT ANGIOGRAPHY HEAD: CPT

## 2024-09-04 PROCEDURE — 71045 X-RAY EXAM CHEST 1 VIEW: CPT

## 2024-09-04 PROCEDURE — 25510000001 IOPAMIDOL PER 1 ML

## 2024-09-04 PROCEDURE — 84484 ASSAY OF TROPONIN QUANT: CPT

## 2024-09-04 PROCEDURE — 84443 ASSAY THYROID STIM HORMONE: CPT

## 2024-09-04 PROCEDURE — 99285 EMERGENCY DEPT VISIT HI MDM: CPT

## 2024-09-04 RX ORDER — L.ACID,CASEI/B.ANIMAL/S.THERMO 16B CELL
1 CAPSULE ORAL DAILY
COMMUNITY

## 2024-09-04 RX ORDER — IOPAMIDOL 755 MG/ML
125 INJECTION, SOLUTION INTRAVASCULAR
Status: COMPLETED | OUTPATIENT
Start: 2024-09-05 | End: 2024-09-04

## 2024-09-04 RX ORDER — SODIUM CHLORIDE 0.9 % (FLUSH) 0.9 %
10 SYRINGE (ML) INJECTION AS NEEDED
Status: DISCONTINUED | OUTPATIENT
Start: 2024-09-04 | End: 2024-09-05 | Stop reason: HOSPADM

## 2024-09-04 RX ADMIN — IOPAMIDOL 125 ML: 755 INJECTION, SOLUTION INTRAVENOUS at 23:55

## 2024-09-05 ENCOUNTER — APPOINTMENT (OUTPATIENT)
Dept: MRI IMAGING | Facility: HOSPITAL | Age: 74
End: 2024-09-05
Payer: MEDICARE

## 2024-09-05 ENCOUNTER — READMISSION MANAGEMENT (OUTPATIENT)
Dept: CALL CENTER | Facility: HOSPITAL | Age: 74
End: 2024-09-05
Payer: MEDICARE

## 2024-09-05 ENCOUNTER — APPOINTMENT (OUTPATIENT)
Dept: CARDIOLOGY | Facility: HOSPITAL | Age: 74
End: 2024-09-05
Payer: MEDICARE

## 2024-09-05 ENCOUNTER — APPOINTMENT (OUTPATIENT)
Dept: CT IMAGING | Facility: HOSPITAL | Age: 74
End: 2024-09-05
Payer: MEDICARE

## 2024-09-05 VITALS
BODY MASS INDEX: 29.08 KG/M2 | HEART RATE: 64 BPM | HEIGHT: 64 IN | OXYGEN SATURATION: 97 % | RESPIRATION RATE: 16 BRPM | WEIGHT: 170.3 LBS | SYSTOLIC BLOOD PRESSURE: 138 MMHG | DIASTOLIC BLOOD PRESSURE: 66 MMHG | TEMPERATURE: 97.9 F

## 2024-09-05 PROBLEM — R53.1 RIGHT SIDED WEAKNESS: Status: ACTIVE | Noted: 2024-09-05

## 2024-09-05 PROBLEM — I63.9 ACUTE ISCHEMIC STROKE: Status: ACTIVE | Noted: 2024-09-05

## 2024-09-05 PROBLEM — E78.5 HYPERLIPIDEMIA: Status: ACTIVE | Noted: 2017-01-06

## 2024-09-05 PROBLEM — R00.2 PALPITATIONS: Status: ACTIVE | Noted: 2024-09-05

## 2024-09-05 LAB
ANION GAP SERPL CALCULATED.3IONS-SCNC: 10 MMOL/L (ref 5–15)
BASOPHILS # BLD AUTO: 0.09 10*3/MM3 (ref 0–0.2)
BASOPHILS NFR BLD AUTO: 0.9 % (ref 0–1.5)
BH CV ECHO MEAS - AO MAX PG: 17.8 MMHG
BH CV ECHO MEAS - AO MEAN PG: 5.6 MMHG
BH CV ECHO MEAS - AO ROOT DIAM: 2.5 CM
BH CV ECHO MEAS - AO V2 MAX: 203.5 CM/SEC
BH CV ECHO MEAS - AO V2 VTI: 31.1 CM
BH CV ECHO MEAS - AVA(I,D): 1.87 CM2
BH CV ECHO MEAS - EDV(CUBED): 49.7 ML
BH CV ECHO MEAS - EDV(MOD-SP2): 51.4 ML
BH CV ECHO MEAS - EDV(MOD-SP4): 54.5 ML
BH CV ECHO MEAS - EF(MOD-SP2): 76.3 %
BH CV ECHO MEAS - EF(MOD-SP4): 73.5 %
BH CV ECHO MEAS - ESV(CUBED): 10.8 ML
BH CV ECHO MEAS - ESV(MOD-SP2): 12.2 ML
BH CV ECHO MEAS - ESV(MOD-SP4): 14.4 ML
BH CV ECHO MEAS - FS: 39.9 %
BH CV ECHO MEAS - IVS/LVPW: 1.36 CM
BH CV ECHO MEAS - IVSD: 1.09 CM
BH CV ECHO MEAS - LA DIMENSION: 3.7 CM
BH CV ECHO MEAS - LAT PEAK E' VEL: 10 CM/SEC
BH CV ECHO MEAS - LV DIASTOLIC VOL/BSA (35-75): 29.8 CM2
BH CV ECHO MEAS - LV MASS(C)D: 102.5 GRAMS
BH CV ECHO MEAS - LV MAX PG: 4.4 MMHG
BH CV ECHO MEAS - LV MEAN PG: 2.18 MMHG
BH CV ECHO MEAS - LV SYSTOLIC VOL/BSA (12-30): 7.9 CM2
BH CV ECHO MEAS - LV V1 MAX: 103.6 CM/SEC
BH CV ECHO MEAS - LV V1 VTI: 20.6 CM
BH CV ECHO MEAS - LVIDD: 3.7 CM
BH CV ECHO MEAS - LVIDS: 2.21 CM
BH CV ECHO MEAS - LVOT AREA: 2.8 CM2
BH CV ECHO MEAS - LVOT DIAM: 1.9 CM
BH CV ECHO MEAS - LVPWD: 0.8 CM
BH CV ECHO MEAS - MED PEAK E' VEL: 6 CM/SEC
BH CV ECHO MEAS - MV A MAX VEL: 88.9 CM/SEC
BH CV ECHO MEAS - MV DEC SLOPE: 98.3 CM/SEC2
BH CV ECHO MEAS - MV DEC TIME: 0.5 SEC
BH CV ECHO MEAS - MV E MAX VEL: 49.6 CM/SEC
BH CV ECHO MEAS - MV E/A: 0.56
BH CV ECHO MEAS - RAP SYSTOLE: 3 MMHG
BH CV ECHO MEAS - RVSP: 23.4 MMHG
BH CV ECHO MEAS - SV(LVOT): 58.2 ML
BH CV ECHO MEAS - SV(MOD-SP2): 39.2 ML
BH CV ECHO MEAS - SV(MOD-SP4): 40 ML
BH CV ECHO MEAS - SVI(LVOT): 31.9 ML/M2
BH CV ECHO MEAS - SVI(MOD-SP2): 21.5 ML/M2
BH CV ECHO MEAS - SVI(MOD-SP4): 21.9 ML/M2
BH CV ECHO MEAS - TR MAX PG: 20.4 MMHG
BH CV ECHO MEAS - TR MAX VEL: 225.8 CM/SEC
BH CV ECHO MEASUREMENTS AVERAGE E/E' RATIO: 6.2
BH CV ECHO SHUNT ASSESSMENT PERFORMED (HIDDEN SCRIPTING): 1
BUN SERPL-MCNC: 13 MG/DL (ref 8–23)
BUN/CREAT SERPL: 24.1 (ref 7–25)
CALCIUM SPEC-SCNC: 8.7 MG/DL (ref 8.6–10.5)
CHLORIDE SERPL-SCNC: 104 MMOL/L (ref 98–107)
CHOLEST SERPL-MCNC: 242 MG/DL (ref 0–200)
CO2 SERPL-SCNC: 23 MMOL/L (ref 22–29)
CREAT SERPL-MCNC: 0.54 MG/DL (ref 0.57–1)
DEPRECATED RDW RBC AUTO: 41.9 FL (ref 37–54)
EGFRCR SERPLBLD CKD-EPI 2021: 95.6 ML/MIN/1.73
EOSINOPHIL # BLD AUTO: 0.38 10*3/MM3 (ref 0–0.4)
EOSINOPHIL NFR BLD AUTO: 3.6 % (ref 0.3–6.2)
ERYTHROCYTE [DISTWIDTH] IN BLOOD BY AUTOMATED COUNT: 14.4 % (ref 12.3–15.4)
GLUCOSE SERPL-MCNC: 95 MG/DL (ref 65–99)
HBA1C MFR BLD: 5.7 % (ref 4.8–5.6)
HCT VFR BLD AUTO: 40.6 % (ref 34–46.6)
HDLC SERPL-MCNC: 47 MG/DL (ref 40–60)
HGB BLD-MCNC: 12.9 G/DL (ref 12–15.9)
IMM GRANULOCYTES # BLD AUTO: 0.02 10*3/MM3 (ref 0–0.05)
IMM GRANULOCYTES NFR BLD AUTO: 0.2 % (ref 0–0.5)
LDLC SERPL CALC-MCNC: 164 MG/DL (ref 0–100)
LDLC/HDLC SERPL: 3.42 {RATIO}
LEFT ATRIUM VOLUME INDEX: 22.2 ML/M2
LEFT ATRIUM VOLUME: 41 ML
LYMPHOCYTES # BLD AUTO: 3.45 10*3/MM3 (ref 0.7–3.1)
LYMPHOCYTES NFR BLD AUTO: 33.1 % (ref 19.6–45.3)
MCH RBC QN AUTO: 25.6 PG (ref 26.6–33)
MCHC RBC AUTO-ENTMCNC: 31.8 G/DL (ref 31.5–35.7)
MCV RBC AUTO: 80.7 FL (ref 79–97)
MONOCYTES # BLD AUTO: 0.91 10*3/MM3 (ref 0.1–0.9)
MONOCYTES NFR BLD AUTO: 8.7 % (ref 5–12)
NEUTROPHILS NFR BLD AUTO: 5.58 10*3/MM3 (ref 1.7–7)
NEUTROPHILS NFR BLD AUTO: 53.5 % (ref 42.7–76)
NRBC BLD AUTO-RTO: 0 /100 WBC (ref 0–0.2)
PLATELET # BLD AUTO: 335 10*3/MM3 (ref 140–450)
PMV BLD AUTO: 10.6 FL (ref 6–12)
POTASSIUM SERPL-SCNC: 3.9 MMOL/L (ref 3.5–5.2)
QT INTERVAL: 414 MS
QTC INTERVAL: 437 MS
RBC # BLD AUTO: 5.03 10*6/MM3 (ref 3.77–5.28)
SODIUM SERPL-SCNC: 137 MMOL/L (ref 136–145)
TRIGL SERPL-MCNC: 171 MG/DL (ref 0–150)
VLDLC SERPL-MCNC: 31 MG/DL (ref 5–40)
WBC NRBC COR # BLD AUTO: 10.43 10*3/MM3 (ref 3.4–10.8)

## 2024-09-05 PROCEDURE — 70553 MRI BRAIN STEM W/O & W/DYE: CPT

## 2024-09-05 PROCEDURE — 99214 OFFICE O/P EST MOD 30 MIN: CPT | Performed by: PSYCHIATRY & NEUROLOGY

## 2024-09-05 PROCEDURE — G0378 HOSPITAL OBSERVATION PER HR: HCPCS

## 2024-09-05 PROCEDURE — 80048 BASIC METABOLIC PNL TOTAL CA: CPT | Performed by: FAMILY MEDICINE

## 2024-09-05 PROCEDURE — 83036 HEMOGLOBIN GLYCOSYLATED A1C: CPT | Performed by: FAMILY MEDICINE

## 2024-09-05 PROCEDURE — 0042T HC CT CEREBRAL PERFUSION W/WO CONTRAST: CPT

## 2024-09-05 PROCEDURE — 92523 SPEECH SOUND LANG COMPREHEN: CPT

## 2024-09-05 PROCEDURE — A9577 INJ MULTIHANCE: HCPCS | Performed by: INTERNAL MEDICINE

## 2024-09-05 PROCEDURE — 85025 COMPLETE CBC W/AUTO DIFF WBC: CPT | Performed by: FAMILY MEDICINE

## 2024-09-05 PROCEDURE — 97162 PT EVAL MOD COMPLEX 30 MIN: CPT

## 2024-09-05 PROCEDURE — 93306 TTE W/DOPPLER COMPLETE: CPT

## 2024-09-05 PROCEDURE — 0 GADOBENATE DIMEGLUMINE 529 MG/ML SOLUTION: Performed by: INTERNAL MEDICINE

## 2024-09-05 PROCEDURE — 93010 ELECTROCARDIOGRAM REPORT: CPT | Performed by: INTERNAL MEDICINE

## 2024-09-05 PROCEDURE — 80061 LIPID PANEL: CPT | Performed by: FAMILY MEDICINE

## 2024-09-05 PROCEDURE — 97166 OT EVAL MOD COMPLEX 45 MIN: CPT

## 2024-09-05 PROCEDURE — 93306 TTE W/DOPPLER COMPLETE: CPT | Performed by: INTERNAL MEDICINE

## 2024-09-05 RX ORDER — POLYETHYLENE GLYCOL 3350 17 G/17G
17 POWDER, FOR SOLUTION ORAL DAILY PRN
Status: DISCONTINUED | OUTPATIENT
Start: 2024-09-05 | End: 2024-09-05 | Stop reason: HOSPADM

## 2024-09-05 RX ORDER — ATORVASTATIN CALCIUM 40 MG/1
40 TABLET, FILM COATED ORAL DAILY
Status: DISCONTINUED | OUTPATIENT
Start: 2024-09-05 | End: 2024-09-05

## 2024-09-05 RX ORDER — ASPIRIN 81 MG/1
81 TABLET ORAL DAILY
Status: DISCONTINUED | OUTPATIENT
Start: 2024-09-05 | End: 2024-09-05 | Stop reason: HOSPADM

## 2024-09-05 RX ORDER — ACETAMINOPHEN 650 MG/1
650 SUPPOSITORY RECTAL EVERY 4 HOURS PRN
Status: DISCONTINUED | OUTPATIENT
Start: 2024-09-05 | End: 2024-09-05 | Stop reason: HOSPADM

## 2024-09-05 RX ORDER — AMOXICILLIN 250 MG
2 CAPSULE ORAL 2 TIMES DAILY PRN
Status: DISCONTINUED | OUTPATIENT
Start: 2024-09-05 | End: 2024-09-05 | Stop reason: HOSPADM

## 2024-09-05 RX ORDER — LEVOTHYROXINE SODIUM 100 UG/1
100 TABLET ORAL
Status: DISCONTINUED | OUTPATIENT
Start: 2024-09-05 | End: 2024-09-05 | Stop reason: HOSPADM

## 2024-09-05 RX ORDER — SODIUM CHLORIDE 0.9 % (FLUSH) 0.9 %
10 SYRINGE (ML) INJECTION EVERY 12 HOURS SCHEDULED
Status: DISCONTINUED | OUTPATIENT
Start: 2024-09-05 | End: 2024-09-05 | Stop reason: HOSPADM

## 2024-09-05 RX ORDER — SODIUM CHLORIDE 0.9 % (FLUSH) 0.9 %
10 SYRINGE (ML) INJECTION AS NEEDED
Status: DISCONTINUED | OUTPATIENT
Start: 2024-09-05 | End: 2024-09-05 | Stop reason: HOSPADM

## 2024-09-05 RX ORDER — ACETAMINOPHEN 160 MG/5ML
650 SOLUTION ORAL EVERY 4 HOURS PRN
Status: DISCONTINUED | OUTPATIENT
Start: 2024-09-05 | End: 2024-09-05 | Stop reason: HOSPADM

## 2024-09-05 RX ORDER — ACETAMINOPHEN 325 MG/1
650 TABLET ORAL EVERY 4 HOURS PRN
Status: DISCONTINUED | OUTPATIENT
Start: 2024-09-05 | End: 2024-09-05 | Stop reason: HOSPADM

## 2024-09-05 RX ORDER — ASPIRIN 81 MG/1
81 TABLET ORAL DAILY
Qty: 30 TABLET | Refills: 3 | Status: SHIPPED | OUTPATIENT
Start: 2024-09-06

## 2024-09-05 RX ORDER — BISACODYL 10 MG
10 SUPPOSITORY, RECTAL RECTAL DAILY PRN
Status: DISCONTINUED | OUTPATIENT
Start: 2024-09-05 | End: 2024-09-05 | Stop reason: HOSPADM

## 2024-09-05 RX ORDER — SODIUM CHLORIDE 9 MG/ML
40 INJECTION, SOLUTION INTRAVENOUS AS NEEDED
Status: DISCONTINUED | OUTPATIENT
Start: 2024-09-05 | End: 2024-09-05 | Stop reason: HOSPADM

## 2024-09-05 RX ORDER — BISACODYL 5 MG/1
5 TABLET, DELAYED RELEASE ORAL DAILY PRN
Status: DISCONTINUED | OUTPATIENT
Start: 2024-09-05 | End: 2024-09-05 | Stop reason: HOSPADM

## 2024-09-05 RX ORDER — ATORVASTATIN CALCIUM 10 MG/1
10 TABLET, FILM COATED ORAL DAILY
Qty: 90 TABLET | Refills: 3 | Status: SHIPPED | OUTPATIENT
Start: 2024-09-05 | End: 2024-09-12

## 2024-09-05 RX ADMIN — LEVOTHYROXINE SODIUM 100 MCG: 100 TABLET ORAL at 05:52

## 2024-09-05 RX ADMIN — ASPIRIN 81 MG: 81 TABLET, COATED ORAL at 08:24

## 2024-09-05 RX ADMIN — Medication 10 ML: at 08:25

## 2024-09-05 RX ADMIN — GADOBENATE DIMEGLUMINE 15 ML: 529 INJECTION, SOLUTION INTRAVENOUS at 06:59

## 2024-09-05 NOTE — PLAN OF CARE
Goal Outcome Evaluation:  Plan of Care Reviewed With: patient           Outcome Evaluation: PT eval complete. Pt in fowlers position, AOx4, demo mild aphasia when answering orienteation questions. Pt reports [place] at baseline with all mobility and ADLs but reports of daughter coming to live with ehr at d/c. Today pt performed bed mobility with SBA. Once sitting pt demo functional AROM in B LE, demo minor strength deficits in R hip flexion. Pt sensation intact, and coordination for heel to shine intact, but did say R foot to L ankle>knee felt more difficulty today. Pt performed sit>stand and ambualted 150' in tellez with Min A x1-2 at times. Pt demo decreased gait speed, increase hip flexion/steppage type gait when swing phase on R LE and difficulty with DF to R LE during gait. Pt reports increased fatigue and returned to room. Pt left sitting EOB with needs in reach and bed alarm set. pt will benefit from skilled Pt services to improve gait safety, decrease fall risk, improve gait cycle and return to PLOF. Recommend home with assist and OPPT.      Anticipated Discharge Disposition (PT): home with assist, home with outpatient therapy services

## 2024-09-05 NOTE — PLAN OF CARE
Goal Outcome Evaluation:    SLP treatment complete. The pt is alert, cooperative, and oriented x4 sitting upright EOB with family members present at time of SLP arrival. Her receptive and expressive language is WNL. She exhibits no deficits with answering questions or following multiple step directions. Her cognitive function is WNL. She does report R sided facial numbness as well as slurred speech. Her granddaughter is present and also reports she's notices a change in speech. Mild dysarthria noted during phrases and conversation as well as during completion of diadochokinesis. SLP will follow and treat to improve pt's motor speech function to baseline status.

## 2024-09-05 NOTE — THERAPY EVALUATION
Acute Care - Speech Language Pathology Initial Evaluation  [facility]     Patient Name: Zabrina Contreras  : 1950  MRN: [id number]  Today's Date: 2024               Admit Date: 2024   SLP evaluation complete. The pt is alert, cooperative, and oriented x4 sitting upright EOB with family members present at time of SLP arrival. Her receptive and expressive language is WNL. She exhibits no deficits with answering questions or following multiple step directions. Her cognitive function is WNL. She does report R sided facial numbness as well as slurred speech. Her granddaughter is present and also reports she's notices a change in speech. Mild dysarthria noted during phrases and conversation as well as during completion of diadochokinesis. SLP will follow and treat to improve pt's motor speech function to baseline status.     Lucio Loyola, CATHLEEN-SLP 2024 15:27 CDT    Visit Dx:    ICD-10-CM ICD-9-CM   1. Right sided weakness  R53.1 728.87   2. Dysarthria  R47.1 784.51   3. Palpitations  R00.2 785.1   4. Acute ischemic stroke  I63.9 434.91     Patient Active Problem List   Diagnosis    Irritable bowel syndrome without diarrhea    History of colonic polyps    Hypothyroid    Hyperlipidemia    Microcytic anemia    Otalgia    Palpitations    Atypical chest pain    Post-menopause    BRBPR (bright red blood per rectum)    Bloating    Diarrhea    Early satiety    S/P cholecystectomy    Bilateral lower abdominal cramping    Upper abdominal pain    Exposure to second hand tobacco smoke    Chronic interstitial lung disease    Right sided weakness    Acute ischemic stroke    Palpitations     Past Medical History:   Diagnosis Date    Chest pain 2017    Disease of thyroid gland     Hyperlipidemia     Hypocholesteremia 2017    Hypothyroidism 2017    Microcytic anemia 2017    Otalgia 2017    Palpitations 2017     Past Surgical History:   Procedure Laterality Date    APPENDECTOMY      CHOLECYSTECTOMY       HYSTERECTOMY      OOPHORECTOMY      TONSILLECTOMY         SLP Recommendation and Plan                    Anticipated Discharge Disposition (SLP): unknown (09/05/24 1410)        Therapy Frequency (SLP SLC): at least, 2 days per week (09/05/24 1410)  Predicted Duration Therapy Intervention (Days): until discharge (09/05/24 1410)                             Plan of Care Reviewed With: patient (09/05/24 1518)  Progress: no change (09/05/24 1518)      SLP EVALUATION (Last 72 Hours)       SLP SLC Evaluation       Row Name 09/05/24 1410                   Communication Assessment/Intervention    Document Type evaluation  -CS        Subjective Information no complaints  -CS        Patient Observations alert;cooperative;agree to therapy  -CS        Patient/Family/Caregiver Comments/Observations Sabino present  -CS        Patient Effort good  -CS           General Information    Patient Profile Reviewed yes  -CS        Pertinent History Of Current Problem R sided weakness, MRI- acute L thalamic lacunar infarct.  -CS        Precautions/Limitations, Vision WFL;for purposes of eval  -CS        Precautions/Limitations, Hearing WFL;for purposes of eval  -CS        Plans/Goals Discussed with patient and family  -CS        Barriers to Rehab none identified  -CS        Patient's Goals for Discharge return to all previous roles/activities  -CS           Pain    Additional Documentation Pain Scale: Numbers Pre/Post-Treatment (Group)  -CS           Pain Scale: Numbers Pre/Post-Treatment    Pretreatment Pain Rating 0/10 - no pain  -CS        Posttreatment Pain Rating 0/10 - no pain  -CS           Comprehension Assessment/Intervention    Comprehension Assessment/Intervention Auditory Comprehension  -CS           Auditory Comprehension Assessment/Intervention    Auditory Comprehension (Communication) WNL  -CS           Expression Assessment/Intervention    Expression Assessment/Intervention verbal expression  -CS           Verbal  Expression Assessment/Intervention    Verbal Expression WNL  -CS           Oral Motor Structure and Function    Oral Motor Structure and Function mild impairment  -CS           Oral Musculature and Cranial Nerve Assessment    Oral Motor General Assessment oral labial or buccal impairment  -CS        Oral Labial or Buccal Impairment, Detail, Cranial Nerve VII (Facial): CN7: Sensory Impairment  -CS           Motor Speech Assessment/Intervention    Motor Speech Function mild impairment  -CS        Characteristics Consistent with Dysarthria slow rate;decreased articulation  -CS        Initiation of Phonation (Communication) mild impairment  -CS        Automatic Speech (Communication) counting 1-20;days of week;months of year;mild impairment  -CS        Conversational Speech (Communication) mild impairment  -CS        Speech intelligibility with unfamiliar listener;90%;100%  -CS           Recommendations    Therapy Frequency (SLP SLC) at least;2 days per week  -CS        Predicted Duration Therapy Intervention (Days) until discharge  -CS        Anticipated Discharge Disposition (SLP) unknown  -CS           Communication Treatment Objective and Progress Goals (SLP)    SLC LTGs Patient will demonstrate functional speech skills for return to discharge environment  -CS        Motor Speech/Dysarthria Treatment Objectives Motor Speech/Dysarthria Treatment Objectives (Group)  -CS           Motor Speech/Dysarthria Treatment Objectives    Articulation Selection articulation, SLP goal 1  -CS           Articulation Goal 1 (SLP)    Improve Articulation Goal 1 (SLP) of specific sounds in connected speech;with minimal cues (75-90%)  -CS        Time Frame (Articulation Goal 1, SLP) by discharge  -CS        Barriers (Articulation Goal 1, SLP) n/a  -CS        Progress/Outcomes (Articulation Goal 1, SLP) new goal  -CS                  User Key  (r) = Recorded By, (t) = Taken By, (c) = Cosigned By      Initials Name Effective Dates    CS  Lucio Loyola CCC-SLP 05/07/24 -                        EDUCATION  The patient has been educated in the following areas:     Communication Impairment.           SLP GOALS       Row Name 09/05/24 1410             Articulation Goal 1 (SLP)    Improve Articulation Goal 1 (SLP) of specific sounds in connected speech;with minimal cues (75-90%)  -CS      Time Frame (Articulation Goal 1, SLP) by discharge  -CS      Barriers (Articulation Goal 1, SLP) n/a  -CS      Progress/Outcomes (Articulation Goal 1, SLP) new goal  -CS                User Key  (r) = Recorded By, (t) = Taken By, (c) = Cosigned By      Initials Name Provider Type    CS Lucio Loyola CCC-SLP Speech and Language Pathologist                              Time Calculation:      Time Calculation- SLP       Row Name 09/05/24 1525             Time Calculation- SLP    SLP Start Time 1410  -CS      SLP Stop Time 1515  -CS      SLP Time Calculation (min) 65 min  -CS      SLP Received On 09/05/24  -CS      SLP Goal Re-Cert Due Date 09/15/24  -CS                User Key  (r) = Recorded By, (t) = Taken By, (c) = Cosigned By      Initials Name Provider Type    CS uLcio Loyola CCC-SLP Speech and Language Pathologist                    Therapy Charges for Today       Code Description Service Date Service Provider Modifiers Qty    71644634300 HC ST EVAL SPEECH AND PROD W LANG  4 9/5/2024 Lucio Loyola CCC-SLP GN 1                       JENNIFER Guzman  9/5/2024

## 2024-09-05 NOTE — ED PROVIDER NOTES
"Subjective   History of Present Illness  Patient is a 74-year-old female that presents to the emergency department for speech difficulty and right-sided extremity weakness.  PMH significant for thyroid disease, hyperlipidemia,microcytic anemia, appendectomy, cholecystectomy, and hysterectomy.  Patient reports she felt fine with no symptoms throughout the day today.  She states that she was driving in her vehicle around 2:00 this afternoon when she had a sudden onset of an abnormal sensation to the right lower extremity.  She states she felt like she was having issues \"lifting her right foot \".  She states the right lower extremity felt very heavy and numb.  She states that this persisted throughout the day but no other symptoms until around 830 this evening when she noticed weakness to the right lower extremity.  She states that when walking her leg would buckle underneath her and she felt very off balance.  Patient reports she had no symptoms other than the right lower extremity until around 930.  She states that around 930 she began noticing the same heavy sensation to the right upper extremity as well as difficulty speaking with slurred speech.  Patient states that she knew what she wanted to say but was unable to get the words out.  She states that speech was not significantly slurred but she could tell a difference in how she was speaking.  Patient states she is now experiencing sensation of muscle cramping but no other pain currently.  Patient states upon her arrival to the ED she was having difficulty signing her on registration forms due to the heaviness sensation to the right upper extremity.  Patient reports she is right-hand dominant.  She denies any headache, blurred vision, lightheadedness, dizziness or near syncope.  Denies any chest pain or shortness of breath.  Denies any abdominal pain, nausea, vomiting, constipation or diarrhea.  Denies any cough, congestion, sore throat, body aches, chills or " fevers.  Denies any urinary urgency, frequency, retention or dysuria.        Review of Systems   Neurological:  Positive for speech difficulty, weakness and numbness.   All other systems reviewed and are negative.      Past Medical History:   Diagnosis Date    Chest pain 2017    Disease of thyroid gland     Hyperlipidemia     Hypocholesteremia 2017    Hypothyroidism 2017    Microcytic anemia 2017    Otalgia 2017    Palpitations 2017       Allergies   Allergen Reactions    Demerol  [Meperidine Hcl] Unknown - High Severity    Meperidine Other (See Comments)     Blood pressure drops, she is still able to use this.     Codeine Nausea And Vomiting     Nausea, vomiting, dizziness       Past Surgical History:   Procedure Laterality Date    APPENDECTOMY      CHOLECYSTECTOMY      HYSTERECTOMY      OOPHORECTOMY      TONSILLECTOMY         Family History   Problem Relation Age of Onset    COPD Mother     No Known Problems Father     Breast cancer Neg Hx        Social History     Socioeconomic History    Marital status:    Tobacco Use    Smoking status: Former     Current packs/day: 0.00     Average packs/day: 1 pack/day for 19.0 years (19.0 ttl pk-yrs)     Types: Cigarettes     Start date:      Quit date:      Years since quittin.7    Smokeless tobacco: Never    Tobacco comments:     Quit 25 years ago   Vaping Use    Vaping status: Never Used   Substance and Sexual Activity    Alcohol use: Yes     Comment: occasional    Drug use: No    Sexual activity: Defer           Objective   Physical Exam  Vitals and nursing note reviewed.   Constitutional:       Appearance: Normal appearance.      Comments: Nontoxic appearing. In no acute distress.    HENT:      Head: Normocephalic and atraumatic.      Right Ear: External ear normal.      Left Ear: External ear normal.      Nose: Nose normal.      Mouth/Throat:      Mouth: Mucous membranes are moist.      Pharynx: Oropharynx is clear.   Eyes:       Extraocular Movements: Extraocular movements intact.      Conjunctiva/sclera: Conjunctivae normal.      Pupils: Pupils are equal, round, and reactive to light.   Cardiovascular:      Rate and Rhythm: Normal rate and regular rhythm.      Pulses: Normal pulses.      Heart sounds: Normal heart sounds.   Pulmonary:      Effort: Pulmonary effort is normal. No respiratory distress.      Breath sounds: Normal breath sounds. No wheezing.   Chest:      Chest wall: No tenderness.   Abdominal:      General: Bowel sounds are normal. There is no distension.      Palpations: Abdomen is soft.      Tenderness: There is no abdominal tenderness. There is no right CVA tenderness, left CVA tenderness, guarding or rebound.   Musculoskeletal:         General: Normal range of motion.      Cervical back: Normal range of motion and neck supple.      Right lower leg: No edema.      Left lower leg: No edema.   Skin:     General: Skin is warm and dry.      Capillary Refill: Capillary refill takes less than 2 seconds.   Neurological:      Mental Status: She is alert and oriented to person, place, and time.      Sensory: Sensation is intact.      Motor: Weakness and pronator drift present.      Coordination: Finger-Nose-Finger Test abnormal and Heel to Lopez Test abnormal.      Comments: NIH Score: 4 points, RLE had some slight drift, ataxia in both RUE and RLE. Speech is intermittently delayed with intermittent episodes of aphasia as well. No slurred speech or facial asymmetry noted.   are equal strength.  No sensory deficits noted.  Gait was not assessed.   Psychiatric:         Attention and Perception: Attention and perception normal.         Mood and Affect: Affect normal. Mood is anxious.         Speech: Speech normal.         Behavior: Behavior normal. Behavior is cooperative.         Thought Content: Thought content normal.         Cognition and Memory: Cognition and memory normal.         Judgment: Judgment normal.       Labs  Reviewed   CBC WITH AUTO DIFFERENTIAL - Abnormal; Notable for the following components:       Result Value    RBC 5.69 (*)     MCH 25.3 (*)     Lymphocytes, Absolute 3.53 (*)     Monocytes, Absolute 0.93 (*)     All other components within normal limits   PROTIME-INR - Normal   APTT - Normal   SINGLE HS TROPONIN T - Normal    Narrative:     High Sensitive Troponin T Reference Range:  <14.0 ng/L- Negative Female for AMI  <22.0 ng/L- Negative Male for AMI  >=14 - Abnormal Female indicating possible myocardial injury.  >=22 - Abnormal Male indicating possible myocardial injury.   Clinicians would have to utilize clinical acumen, EKG, Troponin, and serial changes to determine if it is an Acute Myocardial Infarction or myocardial injury due to an underlying chronic condition.        TSH - Normal   RAINBOW DRAW    Narrative:     The following orders were created for panel order [place] Draw.  Procedure                               Abnormality         Status                     ---------                               -----------         ------                     Green Top (Gel)[[id number]]                                  Final result               Lavender Top[[id number]]                                     Final result               Red Top[[id number]]                                          Final result               Escobar Top[[id number]]                                         Final result               Light Blue Top[[id number]]                                   Final result                 Please view results for these tests on the individual orders.   COMPREHENSIVE METABOLIC PANEL    Narrative:     GFR Normal >60  Chronic Kidney Disease <60  Kidney Failure <15    The GFR formula is only valid for adults with stable renal function between ages 18 and 70.   POCT GLUCOSE FINGERSTICK   GREEN TOP   LAVENDER TOP   RED TOP   GRAY TOP   LIGHT BLUE TOP   CBC AND DIFFERENTIAL    Narrative:     The following orders were created  "for panel order CBC & Differential.  Procedure                               Abnormality         Status                     ---------                               -----------         ------                     CBC Auto Differential[[id number]]        Abnormal            Final result                 Please view results for these tests on the individual orders.        Procedures           ED Course  ED Course as of 09/05/24 0119   u Sep 05, 2024   0025 CT imaging was reviewed and interpreted by stat rad radiology.  CT of the head negative for acute hemorrhage, hydrocephalus, or mass effect.  No large territorial infarct.  No hyperdense vessels.  CT angiogram of head reveals no acute occlusion, severe stenosis, or aneurysm. CTA neck impression reveals no significant stenosis or dissection.  CT cerebral perfusion with and without contrast reveals a normal perfusion study. [KF]   0045 On reevaluation the patient patient has had improvement in all symptoms of right-sided weakness as well as speech difficulties and ataxia. [KF]   0050 Spoke with Dr. Monge regarding need for admission for TIA/CVA workup. He is in agreement with this plan and has accepted patient for admission.  [KF]      ED Course User Index  [KF] Phong Fierro, APRN                          Total (NIH Stroke Scale): 3                  Medical Decision Making  Zabrina Contreras is a 74 y.o. female who presents to the ED for speech difficulty and right-sided extremity weakness.  PMH significant for thyroid disease, hyperlipidemia,microcytic anemia, appendectomy, cholecystectomy, and hysterectomy.  Patient reports she felt fine with no symptoms throughout the day today.  She states that she was driving in her vehicle around 2:00 this afternoon when she had a sudden onset of an abnormal sensation to the right lower extremity.  She states she felt like she was having issues \"lifting her right foot \".  She states the right lower extremity felt very heavy and " numb.  She states that this persisted throughout the day but no other symptoms until around 830 this evening when she noticed weakness to the right lower extremity.  She states that when walking her leg would buckle underneath her and she felt very off balance.  Patient reports she had no symptoms other than the right lower extremity until around 930.  She states that around 930 she began noticing the same heavy sensation to the right upper extremity as well as difficulty speaking with slurred speech.  Patient states that she knew what she wanted to say but was unable to get the words out.  She states that speech was not significantly slurred but she could tell a difference in how she was speaking.  Patient states she is now experiencing sensation of muscle cramping but no other pain currently.  Patient states upon her arrival to the ED she was having difficulty signing her on registration forms due to the heaviness sensation to the right upper extremity.  Patient reports she is right-hand dominant.  She denies any headache, blurred vision, lightheadedness, dizziness or near syncope.  Denies any chest pain or shortness of breath.  Denies any abdominal pain, nausea, vomiting, constipation or diarrhea.  Denies any cough, congestion, sore throat, body aches, chills or fevers.  Denies any urinary urgency, frequency, retention or dysuria.    Patient was non-toxic appearing on arrival. No acute distress was noted.  Vital signs stable.  Hypertensive upon arrival.    Patient's presentation raises suspicion for differentials including, but not limited to, CVA, TIA, electrolyte imbalance, dehydration, hypertensive urgency.    Past medical history, surgical history, and medication regimen reviewed.     Previous notes, labs, imaging, and more reviewed.    NIH score: 4 points upon arrival but following workup completion all symptoms had resolved and patient had no deficits.  ABCD2 Score for TIA: 6 points, high risk    Please  refer to above section of note for lab and imaging results that were reviewed and interpreted by radiology as well as attending physician, Dr. Pitts.     Given findings described above, patient's presentation is likely consistent with possible TIA with right sided deficits.    Spoke with Dr. Bautista, hospitalist regarding admission for further TIA/CVA workup. He has accepted patient for admission to hospital.     I reassessed the patient and discussed the findings of the work up so far with everyone in the room. I said that the next step in treatment would be admission to the hospital for further workup and care. I also said that there is always some diagnostic uncertainty in the ER, that symptoms may change, and new things may be found after being admitted. The hospitalist service assumed primary care of the patient and the patient was admitted to their service in stable condition.    Dragon disclaimer:  Parts of this note may be an electronic transcription/translation of spoken language to printed text using the Dragon dictation system.    Amount and/or Complexity of Data Reviewed  Labs: ordered.  Radiology: ordered.  ECG/medicine tests: ordered.    Risk  Prescription drug management.        Final diagnoses:   Right sided weakness       ED Disposition  ED Disposition       ED Disposition   Decision to Admit    Condition   --    Comment   Level of Care: Remote Telemetry [26]   Diagnosis: Right sided weakness [068212]   Admitting Physician: DONNELL BAUTISTA [[id number]]   Attending Physician: DONNELL BAUTISTA [[id number]]                 No follow-up provider specified.       Medication List      No changes were made to your prescriptions during this visit.            Phong Fierro, APRN  09/05/24 0119

## 2024-09-05 NOTE — DISCHARGE SUMMARY
[facility] Medicine Services  DISCHARGE SUMMARY       Date of Admission: 9/4/2024  Date of Discharge:  9/5/2024  Primary Care Physician: Jenny Wu APRN    Presenting Problem/History of Present Illness:  Right sided weakness    Final Discharge Diagnoses:  Active Hospital Problems    Diagnosis     **Right sided weakness     Acute ischemic stroke     Palpitations     Hypothyroid     Hyperlipidemia        Consults: Dr. Ramesh with Neurology      Pertinent Test Results:   Results for orders placed during the hospital encounter of 11/10/16    Adult Stress Echo Only    Interpretation Summary  · Clinically and electrically negative treadmill portion of the stress test.  · No stress induced wall motion abnormalities.  · Normal stress echo with no significant echocardiographic evidence for myocardial ischemia.      Imaging Results (All)       Procedure Component Value Units Date/Time    CT Angiogram Head w AI Analysis of LVO [[id number]] Collected: 09/05/24 0605     Updated: 09/05/24 0817    Narrative:      EXAMINATION: CT ANGIOGRAM HEAD W AI ANALYSIS OF LVO-     9/4/2024 10:51 PM     HISTORY: Stroke, follow up     DLP: 374 mGycm. Automated exposure control was utilized to minimize the  patient's radiation exposure.     CT angiography of the head is performed after intravenous contrast  enhancement.     Images are acquired in axial plane and subsequent 2D reconstruction in  coronal and sagittal planes and 3D maximum intensity projection image  reconstruction. AI analysis was performed for large vessel occlusion.     There is no previous similar study for comparison. The correlation made  with CT scan of the head performed earlier today.     There are normal size internal carotid arteries at the skull base and in  the carotid canal bilaterally.     Normal internal carotid arteries are seen in the cavernous sinus  bilaterally.     Normal suprasellar internal carotid artery  bilaterally is seen dividing  into normal anterior and middle cerebral arteries.     There are normal insular, opercular and cortical branches of the middle  cerebral arteries bilaterally. No areas of focal stenosis or aneurysmal  dilatation.     Normal sized vertebral arteries enter the foramen magnum and join to  make a normal basilar artery which subsequently divides into normal  posterior cerebral arteries bilaterally. No areas of focal stenosis or  aneurysmal dilatation.       Impression:      1. Normal CT angiography of the head.  2. The NASCET criteria was utilized for estimation of carotid arterial  stenosis.              This report was signed and finalized on 9/5/2024 8:14 AM by Dr. Debra Wilson MD.       CT Angiogram Neck [[id number]] Collected: 09/05/24 0602     Updated: 09/05/24 0803    Narrative:      EXAMINATION: CT ANGIOGRAM NECK-      9/4/2024 10:51 PM     HISTORY: Stroke, follow up     In order to have a CT radiation dose as low as reasonably achievable  Automated Exposure Control was utilized for adjustment of the mA and/or  KV according to patient size.     Total DLP = 373.95 mGy.cm     CT angiography of the neck is performed after intravenous contrast  enhancement.     Images are acquired in axial plane and subsequent 2D reconstruction with  coronal and sagittal planes and 3D maximum intensity projection  reconstruction.     There is no previous similar study for comparison.     Limited visualized thoracic aorta and aortic arch appear normal. No  aneurysmal dilatation or dissection.     There is normal origin course and caliber of the brachiocephalic trunk,  left common carotid and left subclavian arteries.     The brachiocephalic trunk divides into normal-appearing subclavian and  left common carotid arteries.     Both common carotid arteries have a normal course and caliber. No areas  of focal stenosis or aneurysmal dilatation.     Both common carotid arteries divide into normal-appearing  internal and  external carotid arteries.     Both internal carotid arteries have a significant tortuous course. No  focal stenosis or aneurysmal dilatation. Normal size internal carotid  arteries are seen at the skull base and in the carotid canal  bilaterally. The intracranial course is separately discussed and  reported.     There is normal origin course and caliber of both vertebral arteries. No  areas of focal stenosis or aneurysmal dilatation. Normal sized vertebral  arteries enter the foramen magnum. The intracranial course is separately  discussed and reported.     Limited visualized soft tissues of the neck are unremarkable. No mass or  lymphadenopathy.     There is mucosal thickening and fluid level in the left sphenoid sinus.       Impression:      1. Normal CT angiography of the neck.  2. The NASCET criteria were utilized for estimation of carotid arterial  stenosis.                                            This report was signed and finalized on 9/5/2024 8:00 AM by Dr. Debra Wilson MD.       CT CEREBRAL PERFUSION WITH & WITHOUT CONTRAST [[id number]] Collected: 09/05/24 0718     Updated: 09/05/24 0723    Narrative:      CT CEREBRAL PERFUSION W WO CONTRAST- 9/4/2024 11:04 PM     HISTORY: Neuro deficit, acute, stroke suspected     Technique:      1. Perfusion CT is performed to acquire images tracking the temporal  course of iodinated contrast material passing through the cerebral  circulation. Perfusion parameters, such as cerebral blood flow (CBF),  cerebral blood volume (CBV), mean transit time (MTT), etc. are  calculated by [employer] with additional provided perfusion maps and  estimated stroke volumes.   2. Automated exposure control (AEC) protocols are utilized on the  scanner to ensure dose lowered technique.      Comparison: Noncontrast CT brain and brain angiogram dated 9/4/2024  11:04 PM     CT dose:  mGycm     Findings:     Normal, symmetric and MTT, TTP, CBV and CBF.      Tmax >6.0  seconds volume: 0 ml     CBF < 30% volume: 0 ml     Mismatch volume: 0 ml      Mismatch ratio: None       Impression:      Impression:  1. Normal, symmetric cerebral perfusion. No discrete infarct detected by  the perfusion software.     These findings are in agreement with the critical and emergent findings  from the [facility] preliminary report.        This report was signed and finalized on 9/5/2024 7:20 AM by Jayme Kate.       MRI Brain With & Without Contrast [[id number]] Collected: 09/05/24 0710     Updated: 09/05/24 0721    Narrative:      Exam: MRI BRAIN W WO CONTRAST- 9/5/2024 5:20 AM     History: CVA rule out; R53.1-Weakness     Technique: Multisequence, multiplanar MRI of the brain was performed  prior to and after the administration of intravenous gadolinium  contrast.     Contrast: 15 MultiHance.     Comparison: 9/4/2024 CT head     Findings:      Acute left thalamic lacunar infarct with minimal edema, no significant  mass effect, and no hemorrhagic conversion.     Mild global cerebral atrophy. Major vascular flow voids are preserved.  No normal intracranial enhancement. Sella/parasellar structures are  grossly unremarkable. No tonsillar ectopia. Orbits are grossly  unremarkable. Mild sphenoid sinus mucosal thickening. Mastoid air cells  are grossly clear. No suspicious calvarial or extracranial soft tissue  abnormality.       Impression:      Impression:     Acute left thalamic lacunar infarct. No significant mass effect or  hemorrhagic conversion.     This report was signed and finalized on 9/5/2024 7:18 AM by Jayme Kate.       XR Chest 1 View [[id number]] Collected: 09/05/24 0639     Updated: 09/05/24 0643    Narrative:      EXAMINATION: XR CHEST 1 VW-     9/4/2024 10:39 PM     HISTORY: Acute Stroke. Stroke protocol (onset < 12 hrs).     A frontal projection of the chest was obtained. There is no previous  study for comparison.     The lungs are poorly expanded.     Linear parenchymal density  in the right parahilar area probably  represent atelectasis and/or scarring.     There is no pleural effusion, pulmonary congestion or pneumothorax.     The heart size is not optimally evaluated due to the AP projection.  Atheromatous changes of the thoracic aorta are noted.     There is no acute bony abnormality.       Impression:      1. No active cardiopulmonary disease.        This report was signed and finalized on 9/5/2024 6:40 AM by Dr. Debra Wilson MD.       CT Head Without Contrast Stroke Protocol [[id number]] Collected: 09/05/24 0535     Updated: 09/05/24 0542    Narrative:      EXAMINATION: CT HEAD WO CONTRAST STROKE PROTOCOL-      9/4/2024 10:42 PM     HISTORY: Neuro deficit, acute, stroke suspected     In order to have a CT radiation dose as low as reasonably achievable  Automated Exposure Control was utilized for adjustment of the mA and/or  KV according to patient size.     Total DLP = 674.7 mGy.cm     Total CT scan of the head is performed without intravenous contrast  enhancement.     Images are acquired in axial plane and subsequent reconstruction in  coronal and sagittal planes.     There is no previous similar study for comparison.     There is no evidence of a mass. There is no midline shift.     There is no evidence of intracranial hemorrhage or hematoma.     Moderately dilated ventricles, basal cisterns and the cortical sulci  suggesting moderate chronic volume loss.     There are scattered areas of chronic white matter ischemia bilaterally.  The gray-white matter differentiation is maintained.     Posterior fossa structures as visualized appear normal.     The images reviewed in bone window show no evidence of an acute skull  fracture. There is mucosal thickening involving the left sphenoid sinus  with a fluid level. The mastoid air cells are clear.       Impression:      1. No acute intracranial abnormality.  2. Chronic ischemic and atrophic changes.  3. Sphenoid sinusitis.     The  above study was initially reviewed and reported by [facility]. I do not  find any discrepancies.                                This report was signed and finalized on 9/5/2024 5:38 AM by Dr. Debra Wilson MD.             LAB RESULTS:      Lab 09/05/24  0419 09/04/24 2301   WBC 10.43 10.66   HEMOGLOBIN 12.9 14.4   HEMATOCRIT 40.6 45.7   PLATELETS 335 388   NEUTROS ABS 5.58 5.71   IMMATURE GRANS (ABS) 0.02 0.03   LYMPHS ABS 3.45* 3.53*   MONOS ABS 0.91* 0.93*   EOS ABS 0.38 0.37   MCV 80.7 80.3   PROTIME  --  12.8   APTT  --  25.7         Lab 09/05/24  0419 09/04/24 2301   SODIUM 137 139   POTASSIUM 3.9 3.8   CHLORIDE 104 103   CO2 23.0 24.0   ANION GAP 10.0 12.0   BUN 13 12   CREATININE 0.54* 0.76   EGFR 95.6 81.3   GLUCOSE 95 91   CALCIUM 8.7 9.2   HEMOGLOBIN A1C 5.70*  --    TSH  --  2.230         Lab 09/04/24 2301   TOTAL PROTEIN 7.5   ALBUMIN 4.1   GLOBULIN 3.4   ALT (SGPT) 13   AST (SGOT) 16   BILIRUBIN 0.4   ALK PHOS 95         Lab 09/04/24 2301   HSTROP T <6   PROTIME 12.8   INR 0.93         Lab 09/05/24 0419   CHOLESTEROL 242*   LDL CHOL 164*   HDL CHOL 47   TRIGLYCERIDES 171*             Brief Urine Lab Results  (Last result in the past 365 days)        Color   Clarity   Blood   Leuk Est   Nitrite   Protein   CREAT   Urine HCG        04/01/24 1014 Yellow  Comment: REFERENCE RANGE: Yellow, Straw   Cloudy   Negative   Negative   Negative   Trace                 Microbiology Results (last 10 days)       ** No results found for the last 240 hours. **            Hospital Course:   Patient is a 74-year-old  female with past medical history significant for hypothyroidism, hyperlipidemia, that presented to our facility with symptoms concerning for stroke.  Patient reported that her initial symptom was right lower extremity weakness, but also reports that her symptoms progressed to include some weakness, heaviness, and discoordination involving her right upper extremity.  Then, she states that she  began having symptoms of slurred speech and dysarthria as well.  She has never had symptoms like this before.  She was placed in observation on the hospitalist service and neurology was consulted given concern for the possibility of stroke.    MRI imaging of the brain has been completed which confirms a small thalamic stroke in the left posterior internal capsule.  CT angiogram of the head and neck was performed which did not reveal any acute findings.  We currently have an echocardiogram that is pending and we will follow-up with these results prior to anticipated discharge later this evening.    I had a long conversation with both the patient in addition to Dr. Ramesh this afternoon.    With some teaching, my hope is that patient will be compliant with daily aspirin therapy, but she seemed very reluctant at taking a statin moving forward.  Her LDL is greater than 160 and I informed her that in this setting with a confirmed stroke that I would really like her LDL to be less than 100, ideally less than 70.  We spent a lot of time going back and forth about taking a statin medication, she was agreeable to me writing a prescription for the lowest dose of statin medication, and she will consider taking it moving forward.  I did try to educate her that our goal is to take all of those risk factors that could have contributed to this event and then put a plan in place to mitigate against those risks.  An LDL greater than 160 is a risk and I tried to explain that to her in detail.    She did report occasionally getting some palpitations in the outpatient setting, and indicated that when her thyroid is not optimally controlled is when she typically gets the symptoms.  We did check a TSH which was normal at 2.23.  I would like to arrange for an outpatient Zio patch for 10 days at discharge especially in light of her recent clinical history, and she is agreeable with this plan.    She also takes estradiol in the outpatient  "setting, and reports that she has been on this for years.  I explained to her that thromboembolism and even stroke her risk factors for being on this form of treatment, and again for purposes of trying to mitigate risk factors for stroke recurrence that I would recommend a strategy to try to come off of this medication.  She was also agreeable with this plan.    Clinically, she is doing much better.  She reports that her right lower extremity and right upper extremity symptoms are close to her baseline.  Her dysarthria symptoms have resolved.  She is really hoping to be able to go home later this evening, and based on my discussion with Dr. Ramesh this afternoon we are okay with her being discharged home with plans for close outpatient follow-up and utilization of a Zio patch.  Will make arrangements for outpatient physical therapy.  Compliance with prescribed treatment will be important moving forward and this was also stressed.    Close outpatient follow-up with a primary care office in addition to the neurology clinic.    Echocardiogram is pending at this time.  Her EKG reveals normal sinus rhythm and she has had no other acute events.  If echocardiogram is stable anticipate discharge later this evening as described.    Physical Exam on Discharge:  /73 (BP Location: Left arm, Patient Position: Lying)   Pulse 95   Temp 97.7 °F (36.5 °C) (Oral)   Resp 18   Ht 162.6 cm (64\")   Wt 77.2 kg (170 lb 4.8 oz)   LMP  (LMP Unknown)   SpO2 94%   BMI 29.23 kg/m²   Physical Exam  Vitals reviewed.   Constitutional:       General: She is not in acute distress.     Appearance: She is not ill-appearing.   HENT:      Head: Normocephalic.      Mouth/Throat:      Mouth: Mucous membranes are moist.   Cardiovascular:      Rate and Rhythm: Normal rate.   Pulmonary:      Effort: Pulmonary effort is normal. No respiratory distress.   Neurological:      Mental Status: She is alert and oriented to person, place, and time.     "  Motor: No weakness.   Psychiatric:         Mood and Affect: Mood normal.       Condition on Discharge: medically stable    Discharge Disposition:  Home or Self Care    Discharge Medications:     Discharge Medications        New Medications        Instructions Start Date   aspirin 81 MG EC tablet   81 mg, Oral, Daily   Start Date: September 6, 2024     atorvastatin 10 MG tablet  Commonly known as: LIPITOR   10 mg, Oral, Daily             Continue These Medications        Instructions Start Date   levothyroxine 100 MCG tablet  Commonly known as: SYNTHROID, LEVOTHROID   100 mcg, Oral, Daily      Risaquad-2 capsule capsule   1 capsule, Oral, Daily             Stop These Medications      estradiol 1 MG tablet  Commonly known as: ESTRACE              Discharge Diet: heart healthy diet    Activity at Discharge: as tolerated    Follow-up Appointments:   Future Appointments   Date Time Provider Department Center   10/2/2024 11:00 AM Jenny Wu APRN [facility]   We will arrange for 1 week follow-up with the primary care office, in addition to outpatient follow-up with [facility] in approximately 4 weeks    Test Results Pending at Discharge: final Echo results pending and will also coordinate outpatient Ziopatch for 10 days at discharge    **addendum: Echo results reviewed and patient does have evidence of a patent foramen ovale.  Discussed with Dr. Ramesh.  Patient is still okay for discharge this evening with close outpatient follow-up, and even in this circumstance unlikely to benefit from consideration of PFO closure.  To be discussed further with outpatient follow-up with Neurology.    Electronically signed by Kevin Diaz MD, 09/05/24, 15:23 CDT.    Time: 40 minutes.

## 2024-09-05 NOTE — CONSULTS
Neurology Consult Note    Consult Date: 2024  Referring MD: Dayron Monge*  Reason for Consult: stroke    Patient: Zabrina Contreras (74 y.o. female)  MRN: [id number]  : 1950    History of Present Illness:   Zabrina Contreras is a 74 y.o. female who presents with a subacute history of a right arm and leg weakness with some possible dysarthria as well.  She was admitted overnight and was found to have a left thalamic infarct.  She has no previous history of strokes.  She tells me that she exercises often.  She tells me she does not have high cholesterol, hyperlipidemia, and has no previous history of heart disease.  She does have documentation of both hypertension and hyperlipidemia and on her lab work today actually has somewhat of an elevated LDL.  She does tell me that her thyroid is somewhat fickle and when she has low or high TSH levels that she occasionally feels palpitations and her heart.  She has no documented history of cardiac arrhythmias nor atrial fibrillation.  Since being admitted she feels drastically better.  Her right arm and leg strength have improved.  She tells me that she is not going to take a statin as she believes that they are unhealthy for her body.  She is also extremely resistant to taking an aspirin for any long period of time.      Medical History:   Past Medical/Surgical Hx:  Past Medical History:   Diagnosis Date    Chest pain 2017    Disease of thyroid gland     Hyperlipidemia     Hypocholesteremia 2017    Hypothyroidism 2017    Microcytic anemia 2017    Otalgia 2017    Palpitations 2017     Past Surgical History:   Procedure Laterality Date    APPENDECTOMY      CHOLECYSTECTOMY      HYSTERECTOMY      OOPHORECTOMY      TONSILLECTOMY         Medications On Admission:  Medications Prior to Admission   Medication Sig Dispense Refill Last Dose    estradiol (ESTRACE) 1 MG tablet Take 1 tablet by mouth Daily. 90 tablet 3 2024    levothyroxine  (SYNTHROID, LEVOTHROID) 100 MCG tablet Take 1 tablet by mouth Daily. 90 tablet 1 9/4/2024    Probiotic Product (Risaquad-2) capsule capsule Take 1 capsule by mouth Daily.   9/4/2024       Current Medications:    Current Facility-Administered Medications:     acetaminophen (TYLENOL) tablet 650 mg, 650 mg, Oral, Q4H PRN **OR** acetaminophen (TYLENOL) 160 MG/5ML oral solution 650 mg, 650 mg, Oral, Q4H PRN **OR** acetaminophen (TYLENOL) suppository 650 mg, 650 mg, Rectal, Q4H PRN, Dayron Monge MD    aspirin EC tablet 81 mg, 81 mg, Oral, Daily, Dayron Monge MD, 81 mg at 09/05/24 0824    sennosides-docusate (PERICOLACE) 8.6-50 MG per tablet 2 tablet, 2 tablet, Oral, BID PRN **AND** polyethylene glycol (MIRALAX) packet 17 g, 17 g, Oral, Daily PRN **AND** bisacodyl (DULCOLAX) EC tablet 5 mg, 5 mg, Oral, Daily PRN **AND** bisacodyl (DULCOLAX) suppository 10 mg, 10 mg, Rectal, Daily PRN, Dayron Monge MD    levothyroxine (SYNTHROID, LEVOTHROID) tablet 100 mcg, 100 mcg, Oral, Q AM, Dayron Monge MD, 100 mcg at 09/05/24 0552    [COMPLETED] Insert Peripheral IV, , , Once **AND** sodium chloride 0.9 % flush 10 mL, 10 mL, Intravenous, PRN, Dayron Monge MD    sodium chloride 0.9 % flush 10 mL, 10 mL, Intravenous, PRN, Dayron Monge MD    sodium chloride 0.9 % flush 10 mL, 10 mL, Intravenous, Q12H, Dayron Monge MD, 10 mL at 09/05/24 0825    sodium chloride 0.9 % flush 10 mL, 10 mL, Intravenous, PRN, Dayron Monge MD    sodium chloride 0.9 % infusion 40 mL, 40 mL, Intravenous, PRN, Dayron Monge MD     Allergies:  Allergies   Allergen Reactions    Demerol  [Meperidine Hcl] Unknown - High Severity    Meperidine Other (See Comments)     Blood pressure drops, she is still able to use this.     Codeine Nausea And Vomiting     Nausea, vomiting, dizziness       Social Hx:  Social History     Socioeconomic History    Marital status:  "   Tobacco Use    Smoking status: Former     Current packs/day: 0.00     Average packs/day: 1 pack/day for 19.0 years (19.0 ttl pk-yrs)     Types: Cigarettes     Start date:      Quit date:      Years since quittin.7    Smokeless tobacco: Never    Tobacco comments:     Quit 25 years ago   Vaping Use    Vaping status: Never Used   Substance and Sexual Activity    Alcohol use: Yes     Comment: occasional    Drug use: No    Sexual activity: Defer       Family Hx:  Family History   Problem Relation Age of Onset    COPD Mother     No Known Problems Father     Breast cancer Neg Hx      Physical Examination:   Vital Signs:  Vitals:    24 0101 24 0420 24 0740 24 1311   BP: 150/72 120/60 133/66 151/73   BP Location:   Left arm Left arm   Patient Position:   Lying Lying   Pulse: 69 65  95   Resp: 14 12 21 18   Temp: 98.2 °F (36.8 °C) 98 °F (36.7 °C)  97.7 °F (36.5 °C)   TempSrc: Oral   Oral   SpO2: 96% 97%  94%   Weight:    77.2 kg (170 lb 4.8 oz)   Height:    162.6 cm (64\")       General Exam:  Head:  Normocephalic, atraumatic  HEENT:  Neck supple  Fundoscopic Exam:  No signs of disc edema  CVS:  Regular rate and rhythm.  No murmurs  Carotid Examination:  No bruits  Lungs:  Clear to auscultation  Abdomen:  Nontender, Nondistended  Extremities:  No signs of peripheral edema  Skin:  No rashes    Neurologic Exam:    Mental Status:    -Awake, Alert, Oriented X 3  -No word finding difficulties  -No aphasia  -No dysarthria.  Subjective dysarthria per the patient but I am unable to appreciate that.  -Follows simple and complex commands    CN II:  Visual fields full.  Pupils equally reactive to light  CN III, IV, VI:  Extraocular Muscles full with no signs of nystagmus  CN V:  Facial sensory is symmetric with no asymmetries.  CN VII:  Facial motor symmetric  CN VIII:  Gross hearing intact bilaterally  CN IX:  Palate elevates symmetrically  CN X:  Palate elevates symmetrically  CN XI:  " Shoulder shrug symmetric  CN XII:  Tongue is midline on protrusion    Motor: (strength out of 5:  1= minimal movement, 2 = movement in plane of gravity, 3 = movement against gravity, 4 = movement against some resistance, 5 = full strength)    -Right Upper Ext: Proximal: 5 Distal: 5.  Negative satellite sign  -Left Upper Ext: Proximal: 5 Distal: 5    -Right Lower Ext: Extremely subtle weakness that I would describe is 5- out of 5  -Left Lower Ext: Proximal: 5 Distal: 5    DTR:  -Right   Biceps: 2+ Triceps: 2+ Brachioradialis: 2+   Patella: 2+ Ankle: 2+ Neg Babinski  -Left   Biceps: 2+ Triceps: 2+ Brachioradialis: 2+   Patella: 2+ Ankle: 2+ Neg Babinski    Sensory:  -Atypical sensation with decreased sensation on the right neck, right arm, and right leg.    Coordination:  -Finger to nose intact  -Heel to shin intact  -No ataxia    Gait  -No signs of ataxia  -ambulates unassisted    Recent Diagnostics:   Laboratory Results:   - Reviewed in EMR  Lab Results   Component Value Date    GLUCOSE 95 09/05/2024    CALCIUM 8.7 09/05/2024     09/05/2024    K 3.9 09/05/2024    CO2 23.0 09/05/2024     09/05/2024    BUN 13 09/05/2024    CREATININE 0.54 (L) 09/05/2024    EGFRIFAFRI 101 02/22/2021    EGFRIFNONA 84 02/22/2021    BCR 24.1 09/05/2024    ANIONGAP 10.0 09/05/2024     Lab Results   Component Value Date    WBC 10.43 09/05/2024    HGB 12.9 09/05/2024    HCT 40.6 09/05/2024    MCV 80.7 09/05/2024     09/05/2024     Lab Results   Component Value Date    PTT 25.7 09/04/2024    INR 0.93 09/04/2024     Lab Results   Component Value Date    TRIG 171 (H) 09/05/2024    HDL 47 09/05/2024     (H) 09/05/2024     Lab Results   Component Value Date    HGBA1C 5.70 (H) 09/05/2024       Imaging Results:  Imaging Results (Last 24 Hours)       Procedure Component Value Units Date/Time    CT Angiogram Head w AI Analysis of LVO [[id number]] Collected: 09/05/24 0605     Updated: 09/05/24 0817    Narrative:       EXAMINATION: CT ANGIOGRAM HEAD W AI ANALYSIS OF LVO-     9/4/2024 10:51 PM     HISTORY: Stroke, follow up     DLP: 374 mGycm. Automated exposure control was utilized to minimize the  patient's radiation exposure.     CT angiography of the head is performed after intravenous contrast  enhancement.     Images are acquired in axial plane and subsequent 2D reconstruction in  coronal and sagittal planes and 3D maximum intensity projection image  reconstruction. AI analysis was performed for large vessel occlusion.     There is no previous similar study for comparison. The correlation made  with CT scan of the head performed earlier today.     There are normal size internal carotid arteries at the skull base and in  the carotid canal bilaterally.     Normal internal carotid arteries are seen in the cavernous sinus  bilaterally.     Normal suprasellar internal carotid artery bilaterally is seen dividing  into normal anterior and middle cerebral arteries.     There are normal insular, opercular and cortical branches of the middle  cerebral arteries bilaterally. No areas of focal stenosis or aneurysmal  dilatation.     Normal sized vertebral arteries enter the foramen magnum and join to  make a normal basilar artery which subsequently divides into normal  posterior cerebral arteries bilaterally. No areas of focal stenosis or  aneurysmal dilatation.       Impression:      1. Normal CT angiography of the head.  2. The NASCET criteria was utilized for estimation of carotid arterial  stenosis.              This report was signed and finalized on 9/5/2024 8:14 AM by Dr. Debra Wilson MD.       CT Angiogram Neck [[id number]] Collected: 09/05/24 0602     Updated: 09/05/24 0803    Narrative:      EXAMINATION: CT ANGIOGRAM NECK-      9/4/2024 10:51 PM     HISTORY: Stroke, follow up     In order to have a CT radiation dose as low as reasonably achievable  Automated Exposure Control was utilized for adjustment of the mA and/or  KV  according to patient size.     Total DLP = 373.95 mGy.cm     CT angiography of the neck is performed after intravenous contrast  enhancement.     Images are acquired in axial plane and subsequent 2D reconstruction with  coronal and sagittal planes and 3D maximum intensity projection  reconstruction.     There is no previous similar study for comparison.     Limited visualized thoracic aorta and aortic arch appear normal. No  aneurysmal dilatation or dissection.     There is normal origin course and caliber of the brachiocephalic trunk,  left common carotid and left subclavian arteries.     The brachiocephalic trunk divides into normal-appearing subclavian and  left common carotid arteries.     Both common carotid arteries have a normal course and caliber. No areas  of focal stenosis or aneurysmal dilatation.     Both common carotid arteries divide into normal-appearing internal and  external carotid arteries.     Both internal carotid arteries have a significant tortuous course. No  focal stenosis or aneurysmal dilatation. Normal size internal carotid  arteries are seen at the skull base and in the carotid canal  bilaterally. The intracranial course is separately discussed and  reported.     There is normal origin course and caliber of both vertebral arteries. No  areas of focal stenosis or aneurysmal dilatation. Normal sized vertebral  arteries enter the foramen magnum. The intracranial course is separately  discussed and reported.     Limited visualized soft tissues of the neck are unremarkable. No mass or  lymphadenopathy.     There is mucosal thickening and fluid level in the left sphenoid sinus.       Impression:      1. Normal CT angiography of the neck.  2. The NASCET criteria were utilized for estimation of carotid arterial  stenosis.                                            This report was signed and finalized on 9/5/2024 8:00 AM by Dr. Debra Wilson MD.       CT CEREBRAL PERFUSION WITH & WITHOUT  CONTRAST [[id number]] Collected: 09/05/24 0718     Updated: 09/05/24 0723    Narrative:      CT CEREBRAL PERFUSION W WO CONTRAST- 9/4/2024 11:04 PM     HISTORY: Neuro deficit, acute, stroke suspected     Technique:      1. Perfusion CT is performed to acquire images tracking the temporal  course of iodinated contrast material passing through the cerebral  circulation. Perfusion parameters, such as cerebral blood flow (CBF),  cerebral blood volume (CBV), mean transit time (MTT), etc. are  calculated by [employer] with additional provided perfusion maps and  estimated stroke volumes.   2. Automated exposure control (AEC) protocols are utilized on the  scanner to ensure dose lowered technique.      Comparison: Noncontrast CT brain and brain angiogram dated 9/4/2024  11:04 PM     CT dose:  mGycm     Findings:     Normal, symmetric and MTT, TTP, CBV and CBF.      Tmax >6.0 seconds volume: 0 ml     CBF < 30% volume: 0 ml     Mismatch volume: 0 ml      Mismatch ratio: None       Impression:      Impression:  1. Normal, symmetric cerebral perfusion. No discrete infarct detected by  the perfusion software.     These findings are in agreement with the critical and emergent findings  from the [facility] preliminary report.        This report was signed and finalized on 9/5/2024 7:20 AM by Jayme Kate.       MRI Brain With & Without Contrast [[id number]] Collected: 09/05/24 0710     Updated: 09/05/24 0721    Narrative:      Exam: MRI BRAIN W WO CONTRAST- 9/5/2024 5:20 AM     History: CVA rule out; R53.1-Weakness     Technique: Multisequence, multiplanar MRI of the brain was performed  prior to and after the administration of intravenous gadolinium  contrast.     Contrast: 15 MultiHance.     Comparison: 9/4/2024 CT head     Findings:      Acute left thalamic lacunar infarct with minimal edema, no significant  mass effect, and no hemorrhagic conversion.     Mild global cerebral atrophy. Major vascular flow voids are  preserved.  No normal intracranial enhancement. Sella/parasellar structures are  grossly unremarkable. No tonsillar ectopia. Orbits are grossly  unremarkable. Mild sphenoid sinus mucosal thickening. Mastoid air cells  are grossly clear. No suspicious calvarial or extracranial soft tissue  abnormality.       Impression:      Impression:     Acute left thalamic lacunar infarct. No significant mass effect or  hemorrhagic conversion.     This report was signed and finalized on 9/5/2024 7:18 AM by Jayme Kate.       XR Chest 1 View [[id number]] Collected: 09/05/24 0639     Updated: 09/05/24 0643    Narrative:      EXAMINATION: XR CHEST 1 VW-     9/4/2024 10:39 PM     HISTORY: Acute Stroke. Stroke protocol (onset < 12 hrs).     A frontal projection of the chest was obtained. There is no previous  study for comparison.     The lungs are poorly expanded.     Linear parenchymal density in the right parahilar area probably  represent atelectasis and/or scarring.     There is no pleural effusion, pulmonary congestion or pneumothorax.     The heart size is not optimally evaluated due to the AP projection.  Atheromatous changes of the thoracic aorta are noted.     There is no acute bony abnormality.       Impression:      1. No active cardiopulmonary disease.        This report was signed and finalized on 9/5/2024 6:40 AM by Dr. Debra Wilson MD.       CT Head Without Contrast Stroke Protocol [[id number]] Collected: 09/05/24 0535     Updated: 09/05/24 0542    Narrative:      EXAMINATION: CT HEAD WO CONTRAST STROKE PROTOCOL-      9/4/2024 10:42 PM     HISTORY: Neuro deficit, acute, stroke suspected     In order to have a CT radiation dose as low as reasonably achievable  Automated Exposure Control was utilized for adjustment of the mA and/or  KV according to patient size.     Total DLP = 674.7 mGy.cm     Total CT scan of the head is performed without intravenous contrast  enhancement.     Images are acquired in axial plane  and subsequent reconstruction in  coronal and sagittal planes.     There is no previous similar study for comparison.     There is no evidence of a mass. There is no midline shift.     There is no evidence of intracranial hemorrhage or hematoma.     Moderately dilated ventricles, basal cisterns and the cortical sulci  suggesting moderate chronic volume loss.     There are scattered areas of chronic white matter ischemia bilaterally.  The gray-white matter differentiation is maintained.     Posterior fossa structures as visualized appear normal.     The images reviewed in bone window show no evidence of an acute skull  fracture. There is mucosal thickening involving the left sphenoid sinus  with a fluid level. The mastoid air cells are clear.       Impression:      1. No acute intracranial abnormality.  2. Chronic ischemic and atrophic changes.  3. Sphenoid sinusitis.     The above study was initially reviewed and reported by [facility]. I do not  find any discrepancies.                                This report was signed and finalized on 9/5/2024 5:38 AM by Dr. Debra Wilson MD.                  Other RAD/labs:    Small thalamic stroke somewhat close to the posterior internal capsule seen on the MRI  CT of head without done in the ER unremarkable.  CT angiography of the head and neck was reviewed by me and shows no significant stenoses  Cardiac echo is currently pending  Lab work shows an LDL elevated at 164 and a triglyceride of 171  Hemoglobin A1c is 5.7  TSH is currently 2.2      Assessment & Plan:   Patient presenting with an acute thalamic infarct that looks small vessel in nature.  She does have hyperlipidemia, hypertriglyceridemia, and a possible history of hypertension.  She denies these.  She is resistant to taking almost any new medications.  She does have a history of possible palpitations associated with atypical TSH levels.  She tells me that she eats very healthy and exercises as well.  I think it  "would be reasonable to utilize both aspirin and Plavix but I was only able to talk her into aspirin monotherapy.  I am also somewhat concerned that she is not going to take it for any long period of time.  When I tell her that she is not to come off of it until I clear her to do so she tells me \"we will see.\"  She is also extremely resistant to taking any sort of statin.  She tells me that she believes the drug companies faked the data.    Impression:  Acute ischemic stroke in the left thalamic distribution  Hyperlipidemia  Hypertriglyceridemia  Hypothyroidism with a current normal TSH  History of subjective palpitations  Resistance to new medications such as aspirin and statin      Plan:   Aspirin 81 mg daily  Highly recommend statin  Would also be reasonable consider Tricor  At a later date we may consider dual antiplatelet therapy if the patient becomes amenable to this as this is a small vessel stroke  Follow-up cardiac echo as it is currently pending  She likely will require outpatient physical therapy.  Likely clear to be discharged home today.  I would be extremely interested in placing a Zio patch and potentially even transitioning to a Linq monitor at a later date.  F/U with outpatient neuro 4-6 weeks    Faheem Ramesh MD  09/05/24  14:46 CDT    Medical Decision Making    Number/Complexity of Problems  Moderate  1 undiagnosed new problem with uncertain prognosis -   1 acute illness with systemic symptoms -   High  1 acute or chronic illness that poses a threat to life/body function -   High     MDM Data  Moderate - 1/3 categories  Extensive - 2/3 categories    Category 1: 3 of the following  Review of external notes  Review of results  Ordering of each unique test  Independent historian  Category 2:  Independent interpretation of test (ex: imaging)  Category 3:  Discussion of management with another provider    High     Treatment Plan  Moderate - Prescription Drug management  High  Drug therapy " requiring intensive monitoring for toxicity  Decision regarding hospitalization or escalation of care  De-escalate care/DNR decisions

## 2024-09-05 NOTE — PLAN OF CARE
Goal Outcome Evaluation:  Plan of Care Reviewed With: patient        Progress: no change  Outcome Evaluation: OT eval completed. Pt in fowlers upon therapist arrival; A&Ox4; speech remains slightly slurred; no c/o pain. Pt reports I with all BADLs/IADLs including fxl ambulation at baseline. Today, Pt performed supine>sit utilizing bedrail with HOB with SBA. Pt doffed/donned B socks while seated EOB with SBA. Pt performed sit<>stand with CGA. Pt ambulated in hallway with HHA and CGA-Min A due to unsteadiness on feet related to impaire coordination and poor proprioception/kinesthetic awareness of RLE. Pt demos very minor deficits in RUE strength and coordination as well as some deficits in RUE proprioception/kinesthetic awareness. Pt additionally required some verbal cues throughout eval to increase safety awareness due to impulsivity and decreased insight into deficits. Skilled OT intervention indicated to address deficits as mentioned which are currently limiting Pt's ability to safely perform BADLS independently. Recommend inpatient rehab vs home with assist and outpatient therapy at discharge.      Anticipated Discharge Disposition (OT): inpatient rehabilitation facility, home with assist, home with outpatient therapy services

## 2024-09-05 NOTE — H&P
[facility] Medicine Services  HISTORY AND PHYSICAL    Date of Admission: 9/4/2024  Primary Care Physician: Jenny Wu APRN    Subjective   Primary Historian: Patient    Chief Complaint: Right sided weakness    History of Present Illness  74-year-old female with past medical history of hypothyroidism presents to the ER with complaints of right-sided weakness.  She noted her right foot dragging like 2 in the afternoon while driving then she felt her right felt different and heavy.  Later in the evening she noted weakness in the right lower extremity followed by right upper extremity weakness and some changes in her speech this was about 9 PM.  She presents to the emergency room at about 11 PM.  CT scan head showed no acute abnormalities.  CTA head and neck showed no occlusion stenosis or aneurysm and CT cerebral perfusion with and without contrast revealed normal perfusion study.  Symptoms were improving a couple of hours after arriving to the emergency room.    Review of Systems   Otherwise complete ROS reviewed and negative except as mentioned in the HPI.    Past Medical History:   Past Medical History:   Diagnosis Date    Chest pain 1/6/2017    Disease of thyroid gland     Hyperlipidemia     Hypocholesteremia 1/6/2017    Hypothyroidism 1/6/2017    Microcytic anemia 1/6/2017    Otalgia 1/6/2017    Palpitations 1/6/2017     Past Surgical History:  Past Surgical History:   Procedure Laterality Date    APPENDECTOMY      CHOLECYSTECTOMY      HYSTERECTOMY      OOPHORECTOMY      TONSILLECTOMY       Social History:  reports that she quit smoking about 39 years ago. She started smoking about 58 years ago. She has a 19 pack-year smoking history. She has never used smokeless tobacco. She reports current alcohol use. She reports that she does not use drugs.    Family History: family history includes COPD in her mother; No Known Problems in her father.       Allergies:  Allergies  "  Allergen Reactions    Demerol  [Meperidine Hcl] Unknown - High Severity    Meperidine Other (See Comments)     Blood pressure drops, she is still able to use this.     Codeine Nausea And Vomiting     Nausea, vomiting, dizziness       Medications:  Prior to Admission medications    Medication Sig Start Date End Date Taking? Authorizing Provider   azelastine (ASTELIN) 0.1 % nasal spray 2 sprays into the nostril(s) as directed by provider 2 (Two) Times a Day. Use in each nostril as directed 4/23/24  Yes Emma Patton APRN   estradiol (ESTRACE) 1 MG tablet Take 1 tablet by mouth Daily. 7/15/24  Yes Emma Patton APRN   levothyroxine (SYNTHROID, LEVOTHROID) 100 MCG tablet Take 1 tablet by mouth Daily. 6/14/24  Yes Jenny Wu APRN   estradiol (ESTRACE) 1 MG tablet Take 1 tablet by mouth Daily. 7/15/24   Emma Patton APRN   hydrocortisone 2.5 % cream Apply 1 application  topically to the appropriate area as directed 2 (Two) Times a Day. Apply around eyes  Patient taking differently: Apply  topically to the appropriate area as directed 2 (Two) Times a Day. Apply around eyes 7/26/23   Emma Patton APRN   hyoscyamine (LEVSIN) 0.125 MG SL tablet Place 1 tablet under the tongue 2 (Two) Times a Day As Needed for Cramping. 4/6/23   Mansi Olmos APRN   Probiotic Product (Risaquad-2) capsule capsule Take 1 capsule by mouth Daily.    Provider, MD Malgorzata     I have utilized all available immediate resources to obtain, update, or review the patient's current medications (including all prescriptions, over-the-counter products, herbals, cannabis/cannabidiol products, and vitamin/mineral/dietary (nutritional) supplements).    Objective     Vital Signs: /72   Pulse 69   Temp 98.2 °F (36.8 °C) (Oral)   Resp 14   Ht 162.6 cm (64\")   Wt 78.5 kg (173 lb)   LMP  (LMP Unknown)   SpO2 96%   BMI 29.70 kg/m²   Physical Exam  Vitals reviewed.   Constitutional:       General: She is not " in acute distress.     Appearance: She is well-developed. She is not toxic-appearing.   HENT:      Head: Normocephalic and atraumatic.      Right Ear: External ear normal.      Left Ear: External ear normal.      Mouth/Throat:      Mouth: Mucous membranes are dry.      Pharynx: Oropharynx is clear.   Eyes:      General:         Right eye: No discharge.         Left eye: No discharge.      Extraocular Movements: Extraocular movements intact.      Conjunctiva/sclera: Conjunctivae normal.      Pupils: Pupils are equal, round, and reactive to light.   Neck:      Vascular: No JVD.   Cardiovascular:      Rate and Rhythm: Normal rate and regular rhythm.      Pulses: Normal pulses.      Heart sounds: Normal heart sounds. No murmur heard.     No friction rub. No gallop.   Pulmonary:      Effort: Pulmonary effort is normal. No respiratory distress.      Breath sounds: No stridor. No wheezing, rhonchi or rales.   Chest:      Chest wall: No tenderness.   Abdominal:      General: Bowel sounds are normal. There is no distension.      Palpations: Abdomen is soft.      Tenderness: There is no abdominal tenderness. There is no guarding or rebound.      Hernia: No hernia is present.   Musculoskeletal:         General: No swelling, tenderness or deformity. Normal range of motion.      Cervical back: Normal range of motion and neck supple. No rigidity or tenderness. No muscular tenderness.      Right lower leg: No edema.      Left lower leg: No edema.   Skin:     General: Skin is warm and dry.      Findings: No erythema or rash.   Neurological:      General: No focal deficit present.      Mental Status: She is alert and oriented to person, place, and time.      Cranial Nerves: No cranial nerve deficit.      Sensory: Sensory deficit present.      Motor: Weakness present. No abnormal muscle tone.      Deep Tendon Reflexes: Reflexes normal.      Comments: Right knee weaker against resistance, mild decrease sensation on the right leg.   Right arm appears normal.  She still having difficulty with fine coordination in the right hand.  She is having difficulty pronouncing some words.   Psychiatric:         Mood and Affect: Mood normal.         Behavior: Behavior normal.     Results Reviewed:  Lab Results (last 24 hours)       Procedure Component Value Units Date/Time    TSH [[id number]]  (Normal) Collected: 09/04/24 2301    Specimen: Blood Updated: 09/04/24 2355     TSH 2.230 uIU/mL     Comprehensive Metabolic Panel [[id number]] Collected: 09/04/24 2301    Specimen: Blood Updated: 09/04/24 2350     Glucose 91 mg/dL      BUN 12 mg/dL      Creatinine 0.76 mg/dL      Sodium 139 mmol/L      Potassium 3.8 mmol/L      Chloride 103 mmol/L      CO2 24.0 mmol/L      Calcium 9.2 mg/dL      Total Protein 7.5 g/dL      Albumin 4.1 g/dL      ALT (SGPT) 13 U/L      AST (SGOT) 16 U/L      Alkaline Phosphatase 95 U/L      Total Bilirubin 0.4 mg/dL      Globulin 3.4 gm/dL      A/G Ratio 1.2 g/dL      BUN/Creatinine Ratio 15.8     Anion Gap 12.0 mmol/L      eGFR 81.3 mL/min/1.73     Narrative:      GFR Normal >60  Chronic Kidney Disease <60  Kidney Failure <15    The GFR formula is only valid for adults with stable renal function between ages 18 and 70.    Single High Sensitivity Troponin T [[id number]]  (Normal) Collected: 09/04/24 2301    Specimen: Blood Updated: 09/04/24 2348     HS Troponin T <6 ng/L     Narrative:      High Sensitive Troponin T Reference Range:  <14.0 ng/L- Negative Female for AMI  <22.0 ng/L- Negative Male for AMI  >=14 - Abnormal Female indicating possible myocardial injury.  >=22 - Abnormal Male indicating possible myocardial injury.   Clinicians would have to utilize clinical acumen, EKG, Troponin, and serial changes to determine if it is an Acute Myocardial Infarction or myocardial injury due to an underlying chronic condition.         Protime-INR [[id number]]  (Normal) Collected: 09/04/24 2301    Specimen: Blood Updated: 09/04/24 2345      Protime 12.8 Seconds      INR 0.93    aPTT [[id number]]  (Normal) Collected: 09/04/24 2301    Specimen: Blood Updated: 09/04/24 2345     PTT 25.7 seconds     CBC & Differential [[id number]]  (Abnormal) Collected: 09/04/24 2301    Specimen: Blood Updated: 09/04/24 2335    Narrative:      The following orders were created for panel order CBC & Differential.  Procedure                               Abnormality         Status                     ---------                               -----------         ------                     CBC Auto Differential[[id number]]        Abnormal            Final result                 Please view results for these tests on the individual orders.    CBC Auto Differential [[id number]]  (Abnormal) Collected: 09/04/24 2301    Specimen: Blood Updated: 09/04/24 2335     WBC 10.66 10*3/mm3      RBC 5.69 10*6/mm3      Hemoglobin 14.4 g/dL      Hematocrit 45.7 %      MCV 80.3 fL      MCH 25.3 pg      MCHC 31.5 g/dL      RDW 14.5 %      RDW-SD 41.8 fl      MPV 10.1 fL      Platelets 388 10*3/mm3      Neutrophil % 53.6 %      Lymphocyte % 33.1 %      Monocyte % 8.7 %      Eosinophil % 3.5 %      Basophil % 0.8 %      Immature Grans % 0.3 %      Neutrophils, Absolute 5.71 10*3/mm3      Lymphocytes, Absolute 3.53 10*3/mm3      Monocytes, Absolute 0.93 10*3/mm3      Eosinophils, Absolute 0.37 10*3/mm3      Basophils, Absolute 0.09 10*3/mm3      Immature Grans, Absolute 0.03 10*3/mm3      nRBC 0.0 /100 WBC     [facility] Draw [[id number]] Collected: 09/04/24 2301    Specimen: Blood Updated: 09/04/24 2315    Narrative:      The following orders were created for panel order [facility] Draw.  Procedure                               Abnormality         Status                     ---------                               -----------         ------                     Green Top (Gel)[[id number]]                                  Final result               Lavender Top[[id number]]                                     Final  result               Red Top[[id number]]                                          Final result               Escobar Top[[id number]]                                         Final result               Light Blue Top[[id number]]                                   Final result                 Please view results for these tests on the individual orders.    Green Top (Gel) [[id number]] Collected: 09/04/24 2301    Specimen: Blood Updated: 09/04/24 2315     Extra Tube Hold for add-ons.     Comment: Auto resulted.       Lavender Top [[id number]] Collected: 09/04/24 2301    Specimen: Blood Updated: 09/04/24 2315     Extra Tube hold for add-on     Comment: Auto resulted       Red Top [[id number]] Collected: 09/04/24 2301    Specimen: Blood Updated: 09/04/24 2315     Extra Tube Hold for add-ons.     Comment: Auto resulted.       Escobar Top [[id number]] Collected: 09/04/24 2301    Specimen: Blood Updated: 09/04/24 2315     Extra Tube Hold for add-ons.     Comment: Auto resulted.       Light Blue Top [[id number]] Collected: 09/04/24 2301    Specimen: Blood Updated: 09/04/24 2315     Extra Tube Hold for add-ons.     Comment: Auto resulted             Imaging Results (Last 24 Hours)       Procedure Component Value Units Date/Time    CT CEREBRAL PERFUSION WITH & WITHOUT CONTRAST [[id number]] Resulted: 09/04/24 2353     Updated: 09/05/24 0009    CT Angiogram Head w AI Analysis of LVO [[id number]] Resulted: 09/04/24 2352     Updated: 09/04/24 2358    CT Angiogram Neck [[id number]] Resulted: 09/04/24 2352     Updated: 09/04/24 2358    CT Head Without Contrast Stroke Protocol [[id number]] Resulted: 09/04/24 2342     Updated: 09/04/24 2352    XR Chest 1 View [[id number]] Resulted: 09/04/24 2339     Updated: 09/04/24 2344          I have personally reviewed and interpreted the radiology studies and ECG obtained at time of admission.     Assessment / Plan   Assessment:   Active Hospital Problems    Diagnosis     **Right sided weakness     Hypothyroidism       Treatment Plan  The patient will be admitted to my service here at [facility].   Admit to med floor with telemetry  Vitals and neurochecks every 4 hours  Bedside swallow if passed cardiac diet  IV fluids KVO.  PT OT and SLP evaluations  Neurology consult in a.m.  MRI brain  Echocardiogram 2D  Not a tPA candidate due to improving symptoms and presentation over 4 and half hours after onset of symptoms  Lipid panel and A1c checks  Aspirin  Lipitor would be appropriate but patient states she does not want to take lipid-lowering medications.  She is concerned with having a cholesterol does not declare myopathy or prior side effects from this type of medications    Hypothyroidism  TSH normal  Continue Synthroid    DVT prophylaxis SCDs    Medical Decision Making  Number and Complexity of problems: 2 complex medical problems  Differential Diagnosis: see above    Conditions and Status        Condition is unchanged.     MDM Data  External documents reviewed: [employer] EHR  Cardiac tracing (EKG, telemetry) interpretation: NSR  Radiology interpretation: see above  Labs reviewed: see above  Any tests that were considered but not ordered: none     Decision rules/scores evaluated (example [id number], Wells, etc): N/A     Discussed with: Patient     Care Planning  Shared decision making: Patient  Code status and discussions: Full code    Disposition  Social Determinants of Health that impact treatment or disposition: none  Estimated length of stay is to be determined.     I confirmed that the patient's advanced care plan is present, code status is documented, and a surrogate decision maker is listed in the patient's medical record.     The patient's surrogate decision maker is family, see records.     The patient was seen and examined by me on 9/05/2024 at 0252 AM.    Electronically signed by Dayron Monge MD, 09/05/24, 02:52 CDT.

## 2024-09-05 NOTE — PLAN OF CARE
Goal Outcome Evaluation:  Plan of Care Reviewed With: patient, daughter         Patient admitted IP but boarding in ED.  Patient awake, alert, oriented with family support of daughter x 2 at BS.  Patient verbalized frustration and anger at situation and inability to move right leg and arm as she wants to.  Encouraged expression of feelings and encouraged questions, reassurance provided.  Explained plan of care including but not limited to admission for further lab work up including cholesterol and Hgb A1C, as well as advanced imaging (MRI) and start of PT/OT if needed.  Bedside dysphagia passed in ED, no indication for speech therapy at this time.

## 2024-09-05 NOTE — THERAPY EVALUATION
Patient Name: Zabrina Contreras  : 1950    MRN: [id number]                              Today's Date: 2024       Admit Date: 2024    Visit Dx:     ICD-10-CM ICD-9-CM   1. Right sided weakness  R53.1 728.87   2. Dysarthria  R47.1 784.51   3. Palpitations  R00.2 785.1   4. Acute ischemic stroke  I63.9 434.91   5. Impaired mobility [Z74.09]  Z74.09 799.89     Patient Active Problem List   Diagnosis    Irritable bowel syndrome without diarrhea    History of colonic polyps    Hypothyroid    Hyperlipidemia    Microcytic anemia    Otalgia    Palpitations    Atypical chest pain    Post-menopause    BRBPR (bright red blood per rectum)    Bloating    Diarrhea    Early satiety    S/P cholecystectomy    Bilateral lower abdominal cramping    Upper abdominal pain    Exposure to second hand tobacco smoke    Chronic interstitial lung disease    Right sided weakness    Acute ischemic stroke    Palpitations     Past Medical History:   Diagnosis Date    Chest pain 2017    Disease of thyroid gland     Hyperlipidemia     Hypocholesteremia 2017    Hypothyroidism 2017    Microcytic anemia 2017    Otalgia 2017    Palpitations 2017     Past Surgical History:   Procedure Laterality Date    APPENDECTOMY      CHOLECYSTECTOMY      HYSTERECTOMY      OOPHORECTOMY      TONSILLECTOMY        General Information       Row Name 24 6434          Physical Therapy Time and Intention    Document Type evaluation  presents with apashia and R sided weakness Dx; Acute L thalamic infarct. H/o thyroid disease, Hyperlipidemia, anemia, appendectomy, cholecystectomy, hysterectomy  -AJ     Mode of Treatment physical therapy  -AJ       Row Name 24 2447          General Information    Patient Profile Reviewed yes  -AJ     Prior Level of Function independent:;all household mobility;community mobility;home management;gait;ADL's  -AJ     Existing Precautions/Restrictions fall  -AJ     Barriers to Rehab medically complex  -AJ        Row Name 09/05/24 1338          Living Environment    People in Home alone  -       Row Name 09/05/24 1338          Home Main Entrance    Number of Stairs, Main Entrance none  -       Row Name 09/05/24 1338          Stairs Within Home, Primary    Number of Stairs, Within Home, Primary none  -       Row Name 09/05/24 1338          Cognition    Orientation Status (Cognition) oriented x 4  -       Row Name 09/05/24 1338          Safety Issues, Functional Mobility    Safety Issues Affecting Function (Mobility) impulsivity;insight into deficits/self-awareness;safety precaution awareness;safety precautions follow-through/compliance;awareness of need for assistance  -     Impairments Affecting Function (Mobility) balance;coordination;sensation/sensory awareness;endurance/activity tolerance;strength  -               User Key  (r) = Recorded By, (t) = Taken By, (c) = Cosigned By      Initials Name Provider Type    Jayme Mckeon PT DPT Physical Therapist                   Mobility       Row Name 09/05/24 1338          Bed Mobility    Bed Mobility supine-sit  -     Supine-Sit [place] (Bed Mobility) standby assist  -     Assistive Device (Bed Mobility) bed rails;head of bed elevated  -[place] Name 09/05/24 1338          Sit-Stand Transfer    Sit-Stand [place] (Transfers) contact guard;verbal cues  -     Assistive Device (Sit-Stand Transfers) other (see comments)  HHA  -       Row Name 09/05/24 1338          Gait/Stairs (Locomotion)    [place] Level (Gait) minimum assist (75% patient effort);1 person assist;2 person assist  -AJ     Distance in Feet (Gait) 150  -AJ     Deviations/Abnormal Patterns (Gait) right sided deviations;steppage;stride length decreased;gait speed decreased;base of support, narrow;base of support, wide  -AJ     Right Sided Gait Deviations decreased knee extension;foot drop/toe drag  -               User Key  (r) = Recorded By, (t) = Taken By, (c) = Cosigned  By      Initials Name Provider Type    Jayme Mckeon PT DPT Physical Therapist                   Obj/Interventions       Row Name 09/05/24 1338          Range of Motion Comprehensive    General Range of Motion bilateral lower extremity ROM WFL  -       Row Name 09/05/24 1338          Strength Comprehensive (MMT)    General Manual Muscle Testing (MMT) Assessment lower extremity strength deficits identified  -     Comment, General Manual Muscle Testing (MMT) Assessment R hip flexion 3+/5, L hip flexion 4/5, all other 4+/5 or greater  -       Row Name 09/05/24 1338          Motor Skills    Motor Skills coordination  -     Coordination bilateral;lower extremity;minimal impairment;heel to [place]  -       Row Name 09/05/24 1338          Balance    Balance Assessment sitting static balance;sitting dynamic balance;standing static balance;standing dynamic balance  -     Static Sitting Balance independent  -     Dynamic Sitting Balance independent  -AJ     Position, Sitting Balance unsupported;sitting edge of bed  -     Static Standing Balance contact guard  -     Dynamic Standing Balance contact guard;minimal assist;2-person assist  -     Position/Device Used, Standing Balance supported  -     Balance Interventions sitting;standing;sit to stand;supported;dynamic;static  -       Row Name 09/05/24 1338          Sensory Assessment (Somatosensory)    Sensory Assessment (Somatosensory) LE sensation intact  -               User Key  (r) = Recorded By, (t) = Taken By, (c) = Cosigned By      Initials Name Provider Type    Jayme Mckeon PT DPT Physical Therapist                   Goals/Plan       Row Name 09/05/24 1338          Bed Mobility Goal 1 (PT)    Activity/Assistive Device (Bed Mobility Goal 1, PT) rolling to left;rolling to right;sit to supine;supine to sit  -     [place] Level/Cues Needed (Bed Mobility Goal 1, PT) independent  -     Time Frame (Bed Mobility Goal 1, PT) long term goal  (LTG);10 days  -AJ     Progress/Outcomes (Bed Mobility Goal 1, PT) new goal  -       Row Name 09/05/24 1334          Transfer Goal 1 (PT)    Activity/Assistive Device (Transfer Goal 1, PT) sit-to-stand/stand-to-sit;bed-to-chair/chair-to-bed;toilet;car transfer;wheelchair transfer;walk-in shower  -AJ     [place] Level/Cues Needed (Transfer Goal 1, PT) independent  -AJ     Time Frame (Transfer Goal 1, PT) long term goal (LTG);10 days  -AJ     Progress/Outcome (Transfer Goal 1, PT) new goal  -       Row Name 09/05/24 1335          Gait Training Goal 1 (PT)    Activity/Assistive Device (Gait Training Goal 1, PT) gait (walking locomotion);decrease asymmetrical patterns;decrease fall risk;diminish gait deviation;forward stepping;improve balance and speed;increase endurance/gait distance;increase energy conservation;normalize weight shifts  -AJ     [place] Level (Gait Training Goal 1, PT) independent  -AJ     Distance (Gait Training Goal 1, PT) 200' while demo normalized swing phase/steppage gait  -AJ     Time Frame (Gait Training Goal 1, PT) long term goal (LTG);10 days  -AJ     Progress/Outcome (Gait Training Goal 1, PT) new goal  -       Row Name 09/05/24 3397          Therapy Assessment/Plan (PT)    Planned Therapy Interventions (PT) balance training;bed mobility training;gait training;home exercise program;patient/family education;transfer training;postural re-education;strengthening;ROM (range of motion);motor coordination training;neuromuscular re-education  -               User Key  (r) = Recorded By, (t) = Taken By, (c) = Cosigned By      Initials Name Provider Type    Jayme Mckeon, PT DPT Physical Therapist                   Clinical Impression       Row Name 09/05/24 6039          Pain    Pretreatment Pain Rating 0/10 - no pain  -AJ     Posttreatment Pain Rating 0/10 - no pain  -AJ     Pain Intervention(s) Ambulation/increased activity;Repositioned;Nursing Notified  -AJ     Additional  Documentation Pain Scale: Numbers Pre/Post-Treatment (Group)  -       Row Name 09/05/24 1308          Plan of Care Review    Plan of Care Reviewed With patient  -     Outcome Evaluation PT eval complete. Pt in fowlers position, AOx4, demo mild aphasia when answering orienteation questions. Pt reports [place] at baseline with all mobility and ADLs but reports of daughter coming to live with ehr at d/c. Today pt performed bed mobility with SBA. Once sitting pt demo functional AROM in B LE, demo minor strength deficits in R hip flexion. Pt sensation intact, and coordination for heel to shine intact, but did say R foot to L ankle>knee felt more difficulty today. Pt performed sit>stand and ambualted 150' in tellez with Min A x1-2 at times. Pt demo decreased gait speed, increase hip flexion/steppage type gait when swing phase on R LE and difficulty with DF to R LE during gait. Pt reports increased fatigue and returned to room. Pt left sitting EOB with needs in reach and bed alarm set. pt will benefit from skilled Pt services to improve gait safety, decrease fall risk, improve gait cycle and return to PLOF. Recommend home with assist and OPPT.  -       Row Name 09/05/24 0760          Therapy Assessment/Plan (PT)    Patient/Family Therapy Goals Statement (PT) none stated  -     Rehab Potential (PT) good, to achieve stated therapy goals  -     Criteria for Skilled Interventions Met (PT) yes;meets criteria;skilled treatment is necessary  -     Therapy Frequency (PT) 2 times/day  -     Predicted Duration of Therapy Intervention (PT) until d/c  -       Row Name 09/05/24 1338          Vital Signs    Pre Patient Position Supine  -     Intra Patient Position Standing  -     Post Patient Position Sitting  -       Row Name 09/05/24 1338          Positioning and Restraints    Pre-Treatment Position in bed  -     Post Treatment Position bed  -AJ     In Bed notified nsg;side rails up x2;call light within  reach;encouraged to call for assist;exit alarm on;with family/caregiver  -AJ               User Key  (r) = Recorded By, (t) = Taken By, (c) = Cosigned By      Initials Name Provider Type    AJ Jayme Fox, PT DPT Physical Therapist                   Outcome Measures       Row Name 09/05/24 1338 09/05/24 1301       How much help from another person do you currently need...    Turning from your back to your side while in flat bed without using bedrails? 4  -AJ 4  -TB    Moving from lying on back to sitting on the side of a flat bed without bedrails? 4  -AJ 4  -TB    Moving to and from a bed to a chair (including a wheelchair)? 4  -AJ 3  -TB    Standing up from a chair using your arms (e.g., wheelchair, bedside chair)? 3  -AJ 3  -TB    Climbing 3-5 steps with a railing? 2  -AJ 2  -TB    To walk in hospital room? 3  -AJ 3  -TB    AM-PAC 6 Clicks Score (PT) 20  -AJ 19  -TB    Highest Level of Mobility Goal 6 --> Walk 10 steps or more  -AJ 6 --> Walk 10 steps or more  -TB      Row Name 09/05/24 0808          How much help from another person do you currently need...    Turning from your back to your side while in flat bed without using bedrails? 4  -MR     Moving from lying on back to sitting on the side of a flat bed without bedrails? 4  -MR     Moving to and from a bed to a chair (including a wheelchair)? 3  -MR     Standing up from a chair using your arms (e.g., wheelchair, bedside chair)? 2  -MR     Climbing 3-5 steps with a railing? 1  -MR     To walk in hospital room? 1  -MR     AM-PAC 6 Clicks Score (PT) 15  -MR     Highest Level of Mobility Goal 4 --> Transfer to chair/commode  -MR       Row Name 09/05/24 1342 09/05/24 1338       Functional Assessment    Outcome Measure Options AM-PAC 6 Clicks Daily Activity (OT)  -LS AM-PAC 6 Clicks Basic Mobility (PT)  -              User Key  (r) = Recorded By, (t) = Taken By, (c) = Cosigned By      Initials Name Provider Type    TB Kathie De Oliveira RN Registered Nurse     Nadia Nielson, OTR/L Occupational Therapist    MR Prater Enma, RN Registered Nurse    Jayme Mckeon, PT DPT Physical Therapist                                 Physical Therapy Education       Title: PT OT SLP Therapies (In Progress)       Topic: Physical Therapy (Done)       Point: Mobility training (Done)       Learning Progress Summary             Patient Acceptance, E, VU by TORY at 9/5/2024 1526    Comment: role of PT, OPPT referral, normalized gait                         Point: Home exercise program (Done)       Learning Progress Summary             Patient Acceptance, E, VU by TORY at 9/5/2024 1526    Comment: role of PT, OPPT referral, normalized gait                         Point: Body mechanics (Done)       Learning Progress Summary             Patient Acceptance, E, VU by TORY at 9/5/2024 1526    Comment: role of PT, OPPT referral, normalized gait                         Point: Precautions (Done)       Learning Progress Summary             Patient Acceptance, E, VU by TORY at 9/5/2024 1526    Comment: role of PT, OPPT referral, normalized gait                                         User Key       Initials Effective Dates Name Provider Type Discipline    TORY 08/15/24 -  Jayme Fox, PT DPT Physical Therapist PT                  PT Recommendation and Plan  Planned Therapy Interventions (PT): balance training, bed mobility training, gait training, home exercise program, patient/family education, transfer training, postural re-education, strengthening, ROM (range of motion), motor coordination training, neuromuscular re-education  Plan of Care Reviewed With: patient  Outcome Evaluation: PT eval complete. Pt in fowlers position, AOx4, demo mild aphasia when answering orienteation questions. Pt reports [place] at baseline with all mobility and ADLs but reports of daughter coming to live with ehr at d/c. Today pt performed bed mobility with SBA. Once sitting pt demo functional AROM in B LE, demo minor  strength deficits in R hip flexion. Pt sensation intact, and coordination for heel to shine intact, but did say R foot to L ankle>knee felt more difficulty today. Pt performed sit>stand and ambualted 150' in tellez with Min A x1-2 at times. Pt demo decreased gait speed, increase hip flexion/steppage type gait when swing phase on R LE and difficulty with DF to R LE during gait. Pt reports increased fatigue and returned to room. Pt left sitting EOB with needs in reach and bed alarm set. pt will benefit from skilled Pt services to improve gait safety, decrease fall risk, improve gait cycle and return to PLOF. Recommend home with assist and OPPT.     Time Calculation:         PT Charges       Row Name 09/05/24 1338             Time Calculation    Start Time 1338  -AJ      Stop Time 1410  +8 for chart review  -      Time Calculation (min) 32 min  -AJ      PT Received On 09/05/24  -AJ      PT Goal Re-Cert Due Date 09/15/24  -AJ         Untimed Charges    PT Eval/Re-eval Minutes 40  -AJ         Total Minutes    Untimed Charges Total Minutes 40  -AJ       Total Minutes 40  -AJ                User Key  (r) = Recorded By, (t) = Taken By, (c) = Cosigned By      Initials Name Provider Type    AJ Jayme Fox PT DPT Physical Therapist                  Therapy Charges for Today       Code Description Service Date Service Provider Modifiers Qty    [id number] HC PT EVAL MOD COMPLEXITY 3 9/5/2024 Jayme Fox PT DPROBYN GP 1            PT G-Codes  Outcome Measure Options: AM-PAC 6 Clicks Daily Activity (OT)  AM-PAC 6 Clicks Score (PT): 20  AM-PAC 6 Clicks Score (OT): 21  PT Discharge Summary  Anticipated Discharge Disposition (PT): home with assist, home with outpatient therapy services    Jayme Fox PT DPT  9/5/2024

## 2024-09-05 NOTE — THERAPY EVALUATION
Patient Name: Zabrina Contreras  : 1950    MRN: [id number]                              Today's Date: 2024       Admit Date: 2024    Visit Dx:     ICD-10-CM ICD-9-CM   1. Right sided weakness  R53.1 728.87     Patient Active Problem List   Diagnosis    Irritable bowel syndrome without diarrhea    History of colonic polyps    Hypothyroidism    Hypercholesterolemia    Microcytic anemia    Otalgia    Palpitations    Atypical chest pain    Post-menopause    BRBPR (bright red blood per rectum)    Bloating    Diarrhea    Early satiety    S/P cholecystectomy    Bilateral lower abdominal cramping    Upper abdominal pain    Exposure to second hand tobacco smoke    Chronic interstitial lung disease    Right sided weakness     Past Medical History:   Diagnosis Date    Chest pain 2017    Disease of thyroid gland     Hyperlipidemia     Hypocholesteremia 2017    Hypothyroidism 2017    Microcytic anemia 2017    Otalgia 2017    Palpitations 2017     Past Surgical History:   Procedure Laterality Date    APPENDECTOMY      CHOLECYSTECTOMY      HYSTERECTOMY      OOPHORECTOMY      TONSILLECTOMY        General Information       Row Name 24 1342          OT Time and Intention    Document Type evaluation  cc: R side weakness. Imaging revealed acute left thalamic lacunar infarct  -LS     Mode of Treatment occupational therapy  -LS       Row Name 24 1342          General Information    Patient Profile Reviewed yes  -LS     Prior Level of Function independent:;ADL's;all household mobility;community mobility;home management;cooking;cleaning;driving;shopping  -LS     Existing Precautions/Restrictions fall;other (see comments)  R side weakness  -LS       Row Name 24 1342          Occupational Profile    Environmental Supports and Barriers (Occupational Profile) Pt's daughter will be staying with her for a while upon return home. Walk in shower with shower chair and grab bars. Other AD/DME:  transport chair, rwx  -       Row Name 09/05/24 1342          Living Environment    People in Home alone  -       Row Name 09/05/24 1342          Home Main Entrance    Number of Stairs, Main Entrance none  -LS       Row Name 09/05/24 1342          Stairs Within Home, Primary    Number of Stairs, Within Home, Primary none  -LS       Row Name 09/05/24 1342          Cognition    Orientation Status (Cognition) oriented x 4  -       Row Name 09/05/24 1342          Safety Issues, Functional Mobility    Safety Issues Affecting Function (Mobility) impulsivity;insight into deficits/self-awareness;judgment;safety precaution awareness;safety precautions follow-through/compliance;awareness of need for assistance  -     Impairments Affecting Function (Mobility) balance;coordination;sensation/sensory awareness;endurance/activity tolerance  -               User Key  (r) = Recorded By, (t) = Taken By, (c) = Cosigned By      Initials Name Provider Type     Nadia Elias OTR/L Occupational Therapist                     Mobility/ADL's       Row Name 09/05/24 1342          Bed Mobility    Bed Mobility supine-sit  -     Supine-Sit [place] (Bed Mobility) standby assist  -     Assistive Device (Bed Mobility) bed rails;head of bed elevated  -       Row Name 09/05/24 1342          Transfers    Transfers sit-stand transfer;stand-sit transfer  -       Row Name 09/05/24 1342          Sit-Stand Transfer    Sit-Stand [place] (Transfers) contact guard;verbal cues  -       Row Name 09/05/24 1342          Stand-Sit Transfer    Stand-Sit [place] (Transfers) contact guard;verbal cues  -       Row Name 09/05/24 1342          Functional Mobility    Functional Mobility- Ind. Level contact guard assist;minimum assist (75% patient effort);verbal cues required  -     Functional Mobility- Device other (see comments)  HHA  -       Row Name 09/05/24 1342          Activities of Daily Living    BADL  Assessment/Intervention upper body dressing;lower body dressing;toileting  -       Row Name 09/05/24 1342          Upper Body Dressing Assessment/Training    [place] Level (Upper Body Dressing) upper body dressing skills;standby assist  -LS     Position (Upper Body Dressing) edge of bed sitting  -       Row Name 09/05/24 1342          Lower Body Dressing Assessment/Training    [place] Level (Lower Body Dressing) lower body dressing skills;contact guard assist  -LS     Position (Lower Body Dressing) unsupported sitting;unsupported standing  -       Row Name 09/05/24 1342          Toileting Assessment/Training    [place] Level (Toileting) toileting skills;contact guard assist  -LS     Position (Toileting) unsupported sitting;unsupported standing  -               User Key  (r) = Recorded By, (t) = Taken By, (c) = Cosigned By      Initials Name Provider Type    LS Nadia Elias OTR/L Occupational Therapist                   Obj/Interventions       [facility] Name 09/05/24 1342          Sensory Assessment (Somatosensory)    Sensory Assessment Pt reports some numbness/tingling throughout RUE  -       Row Name 09/05/24 1342          Vision Assessment/Intervention    Visual Impairment/Limitations WFL  -       Row Name 09/05/24 1342          Range of Motion Comprehensive    General Range of Motion bilateral upper extremity ROM WFL  -[facility] Name 09/05/24 1342          Strength Comprehensive (MMT)    Comment, General Manual Muscle Testing (MMT) Assessment RUE strength grossly 4+/5; LUE strength grossly 5/5  -       Row Name 09/05/24 1342          Motor Skills    Motor Skills coordination  -     Coordination bilateral;dysdiadochokinesia;WNL;right;finger to nose;minimal impairment  -       Row Name 09/05/24 1342          Balance    Balance Assessment sitting static balance;sitting dynamic balance;standing static balance;standing dynamic balance  -     Static Sitting Balance independent  -      Dynamic Sitting Balance standby assist  -LS     Position, Sitting Balance sitting edge of bed;unsupported  -LS     Static Standing Balance contact guard  -LS     Dynamic Standing Balance contact guard;minimal assist  -LS     Position/Device Used, Standing Balance other (see comments);supported  HHA  -LS               User Key  (r) = Recorded By, (t) = Taken By, (c) = Cosigned By      Initials Name Provider Type    LS Nadia Elias, OTR/L Occupational Therapist                   Goals/Plan       Row Name 09/05/24 1342          Transfer Goal 1 (OT)    Activity/Assistive Device (Transfer Goal 1, OT) toilet;shower chair  -LS     [place] Level/Cues Needed (Transfer Goal 1, OT) modified independence  -LS     Time Frame (Transfer Goal 1, OT) long term goal (LTG);10 days  -LS     Progress/Outcome (Transfer Goal 1, OT) new goal  -LS       Row Name 09/05/24 1342          Bathing Goal 1 (OT)    Activity/Device (Bathing Goal 1, OT) bathing skills, all  -LS     [place] Level/Cues Needed (Bathing Goal 1, OT) modified independence  -LS     Time Frame (Bathing Goal 1, OT) long term goal (LTG);10 days  -LS     Progress/Outcomes (Bathing Goal 1, OT) new goal  -       Row Name 09/05/24 1342          Dressing Goal 1 (OT)    Activity/Device (Dressing Goal 1, OT) dressing skills, all  -LS     [place]/Cues Needed (Dressing Goal 1, OT) modified independence  -LS     Time Frame (Dressing Goal 1, OT) long term goal (LTG);10 days  -LS     Strategies/Barriers (Dressing Goal 1, OT) including manipulating fasteners  -LS     Progress/Outcome (Dressing Goal 1, OT) new goal  -LS       Row Name 09/05/24 1342          Therapy Assessment/Plan (OT)    Planned Therapy Interventions (OT) activity tolerance training;functional balance retraining;occupation/activity based interventions;ROM/therapeutic exercise;adaptive equipment training;BADL retraining;strengthening exercise;transfer/mobility retraining;patient/caregiver  education/training;neuromuscular control/coordination retraining  -               User Key  (r) = Recorded By, (t) = Taken By, (c) = Cosigned By      Initials Name Provider Type    Nadia Nielson OTR/L Occupational Therapist                   Clinical Impression       Row Name 09/05/24 1347          Pain Assessment    Pretreatment Pain Rating 0/10 - no pain  -LS     Posttreatment Pain Rating 0/10 - no pain  -LS       Row Name 09/05/24 1342          Plan of Care Review    Plan of Care Reviewed With patient  -LS     Progress no change  -     Outcome Evaluation OT eval completed. Pt in fowlers upon therapist arrival; A&Ox4; speech remains slightly slurred; no c/o pain. Pt reports I with all BADLs/IADLs including fxl ambulation at baseline. Today, Pt performed supine>sit utilizing bedrail with HOB with SBA. Pt doffed/donned B socks while seated EOB with SBA. Pt performed sit<>stand with CGA. Pt ambulated in hallway with HHA and CGA-Min A due to unsteadiness on feet related to impaire coordination and poor proprioception/kinesthetic awareness of RLE. Pt demos very minor deficits in RUE strength and coordination as well as some deficits in RUE proprioception/kinesthetic awareness. Pt additionally required some verbal cues throughout eval to increase safety awareness due to impulsivity and decreased insight into deficits. Skilled OT intervention indicated to address deficits as mentioned which are currently limiting Pt's ability to safely perform BADLS independently. Recommend inpatient rehab vs home with assist and outpatient therapy at discharge.  -       Row Name 09/05/24 1346          Therapy Assessment/Plan (OT)    Rehab Potential (OT) good, to achieve stated therapy goals  -     Criteria for Skilled Therapeutic Interventions Met (OT) yes;skilled treatment is necessary  -     Therapy Frequency (OT) 5 times/wk  -       Row Name 09/05/24 1346          Therapy Plan Review/Discharge Plan (OT)    Anticipated  Discharge Disposition (OT) inpatient rehabilitation facility;home with assist;home with outpatient therapy services  -       Row Name 09/05/24 1342          Positioning and Restraints    Pre-Treatment Position in bed  -LS     Post Treatment Position bed  -LS     In Bed sitting EOB;call light within reach;encouraged to call for assist;side rails up x2;exit alarm on;with family/caregiver  -               User Key  (r) = Recorded By, (t) = Taken By, (c) = Cosigned By      Initials Name Provider Type    Nadia Nielson OTR/L Occupational Therapist                   Outcome Measures       Row Name 09/05/24 1342          How much help from another is currently needed...    Putting on and taking off regular lower body clothing? 3  -LS     Bathing (including washing, rinsing, and drying) 3  -LS     Toileting (which includes using toilet bed pan or urinal) 3  -LS     Putting on and taking off regular upper body clothing 4  -LS     Taking care of personal grooming (such as brushing teeth) 4  -LS     Eating meals 4  -LS     AM-PAC 6 Clicks Score (OT) 21  -LS       Row Name 09/05/24 1301 09/05/24 0808       How much help from another person do you currently need...    Turning from your back to your side while in flat bed without using bedrails? 4  -TB 4  -MR    Moving from lying on back to sitting on the side of a flat bed without bedrails? 4  -TB 4  -MR    Moving to and from a bed to a chair (including a wheelchair)? 3  -TB 3  -MR    Standing up from a chair using your arms (e.g., wheelchair, bedside chair)? 3  -TB 2  -MR    Climbing 3-5 steps with a railing? 2  -TB 1  -MR    To walk in hospital room? 3  -TB 1  -MR    AM-PAC 6 Clicks Score (PT) 19  -TB 15  -MR    Highest Level of Mobility Goal 6 --> Walk 10 steps or more  -TB 4 --> Transfer to chair/commode  -MR      Row Name 09/05/24 1342          Functional Assessment    Outcome Measure Options AM-PAC 6 Clicks Daily Activity (OT)  -               User Key  (r) =  Recorded By, (t) = Taken By, (c) = Cosigned By      Initials Name Provider Type     Kathie De Oliveira RN Registered Nurse    Nadia Nielson OTR/L Occupational Therapist     MoiEnma, RN Registered Nurse                    Occupational Therapy Education       Title: PT OT SLP Therapies (In Progress)       Topic: Occupational Therapy (In Progress)       Point: ADL training (Done)       Description:   Instruct learner(s) on proper safety adaptation and remediation techniques during self care or transfers.   Instruct in proper use of assistive devices.                  Learning Progress Summary             Patient Acceptance, E, VU by  at 9/5/2024 1441                         Point: Home exercise program (Not Started)       Description:   Instruct learner(s) on appropriate technique for monitoring, assisting and/or progressing therapeutic exercises/activities.                  Learner Progress:  Not documented in this visit.              Point: Precautions (Done)       Description:   Instruct learner(s) on prescribed precautions during self-care and functional transfers.                  Learning Progress Summary             Patient Acceptance, E, VU by  at 9/5/2024 1441                         Point: Body mechanics (Done)       Description:   Instruct learner(s) on proper positioning and spine alignment during self-care, functional mobility activities and/or exercises.                  Learning Progress Summary             Patient Acceptance, E, VU by  at 9/5/2024 1441                                         User Key       Initials Effective Dates Name Provider Type Discipline     06/20/22 -  Nadia Elias OTR/L Occupational Therapist OT                  OT Recommendation and Plan  Planned Therapy Interventions (OT): activity tolerance training, functional balance retraining, occupation/activity based interventions, ROM/therapeutic exercise, adaptive equipment training, BADL retraining, strengthening  exercise, transfer/mobility retraining, patient/caregiver education/training, neuromuscular control/coordination retraining  Therapy Frequency (OT): 5 times/wk  Plan of Care Review  Plan of Care Reviewed With: patient  Progress: no change  Outcome Evaluation: OT eval completed. Pt in fowlers upon therapist arrival; A&Ox4; speech remains slightly slurred; no c/o pain. Pt reports I with all BADLs/IADLs including fxl ambulation at baseline. Today, Pt performed supine>sit utilizing bedrail with HOB with SBA. Pt doffed/donned B socks while seated EOB with SBA. Pt performed sit<>stand with CGA. Pt ambulated in hallway with HHA and CGA-Min A due to unsteadiness on feet related to impaire coordination and poor proprioception/kinesthetic awareness of RLE. Pt demos very minor deficits in RUE strength and coordination as well as some deficits in RUE proprioception/kinesthetic awareness. Pt additionally required some verbal cues throughout eval to increase safety awareness due to impulsivity and decreased insight into deficits. Skilled OT intervention indicated to address deficits as mentioned which are currently limiting Pt's ability to safely perform BADLS independently. Recommend inpatient rehab vs home with assist and outpatient therapy at discharge.     Time Calculation:         Time Calculation- OT       Row Name 09/05/24 1342             Time Calculation- OT    OT Start Time 1342  +10 minutes chart review  -      OT Stop Time 1411  -      OT Time Calculation (min) 29 min  -      OT Non-Billable Time (min) 39 min  -      OT Received On 09/05/24  -      OT Goal Re-Cert Due Date 09/15/24  -                User Key  (r) = Recorded By, (t) = Taken By, (c) = Cosigned By      Initials Name Provider Type    Nadia Nielson, OTR/L Occupational Therapist                  Therapy Charges for Today       Code Description Service Date Service Provider Modifiers Qty    [id number]  OT EVAL MOD COMPLEXITY 3 9/5/2024  Nadia Elias, OTR/L GO 1                 Nadia Elias, OTR/L  9/5/2024

## 2024-09-05 NOTE — CASE MANAGEMENT/SOCIAL WORK
Discharge Planning Assessment  [facility]     Patient Name: Zabrina Contreras  MRN: [id number]  Today's Date: 9/5/2024    Admit Date: 9/4/2024        Discharge Needs Assessment       Row Name 09/05/24 0952       Living Environment    People in Home alone    Current Living Arrangements home    Potentially Unsafe Housing Conditions none    Primary Care Provided by self    Provides Primary Care For no one    Family Caregiver if Needed child(sabina), adult    Able to Return to Prior Arrangements yes       Resource/Environmental Concerns    Resource/Environmental Concerns none       Transition Planning    Patient/Family Anticipates Transition to home    Patient/Family Anticipated Services at Transition none    Transportation Anticipated family or friend will provide       Discharge Needs Assessment    Readmission Within the Last 30 Days no previous admission in last 30 days    Equipment Currently Used at Home none    Concerns to be Addressed no discharge needs identified;denies needs/concerns at this time    Anticipated Changes Related to Illness none    Equipment Needed After Discharge none    Discharge Coordination/Progress Pt plans to return home upon DC, and has the assist of her daughter if needed.  Has a PCP and Rx coverage.  Denies the need for HH.  Will follow for any DC needs.                   Discharge Plan    No documentation.                 Continued Care and Services - Admitted Since 9/4/2024    No active coordination exists for this encounter.          Demographic Summary    No documentation.                  Functional Status    No documentation.                  Psychosocial    No documentation.                  Abuse/Neglect    No documentation.                  Legal    No documentation.                  Substance Abuse    No documentation.                  Patient Forms    No documentation.                     Deb Vasquez RN

## 2024-09-05 NOTE — PLAN OF CARE
Goal Outcome Evaluation:  Plan of Care Reviewed With: patient        Progress: improving  Outcome Evaluation: Pt d/c home w/ family.  NIH 0.  Reviewed Stroke folder, labs and new medications.  Discussed importance of healthy diet and exercise.  Educated pt on s/s of stroke and importance of getting to hospital quickly.   Reviewed new meds that were started and the need to take as directed.  Informed pt to make follow-up appts as soon as possible.  Instructed pt to have Zio patch placed in AM and gave order for PT.  Removed IV's and tele

## 2024-09-06 ENCOUNTER — HOSPITAL ENCOUNTER (OUTPATIENT)
Dept: CARDIOLOGY | Facility: HOSPITAL | Age: 74
Discharge: HOME | End: 2024-09-06
Payer: MEDICARE

## 2024-09-06 ENCOUNTER — TRANSITIONAL CARE MANAGEMENT TELEPHONE ENCOUNTER (OUTPATIENT)
Dept: CALL CENTER | Facility: HOSPITAL | Age: 74
End: 2024-09-06
Payer: MEDICARE

## 2024-09-06 DIAGNOSIS — R00.2 PALPITATIONS: ICD-10-CM

## 2024-09-06 PROCEDURE — 93246 EXT ECG>7D<15D RECORDING: CPT

## 2024-09-06 NOTE — THERAPY DISCHARGE NOTE
Acute Care - Physical Therapy Discharge Summary  [facility]       Patient Name: Zabrina Contreras  : 1950  MRN: [id number]    Today's Date: 2024                 Admit Date: 2024      PT Recommendation and Plan    Visit Dx:    ICD-10-CM ICD-9-CM   1. Right sided weakness  R53.1 728.87   2. Dysarthria  R47.1 784.51   3. Palpitations  R00.2 785.1   4. Acute ischemic stroke  I63.9 434.91   5. Impaired mobility [Z74.09]  Z74.09 799.89                PT Rehab Goals       Row Name 24 1000             Bed Mobility Goal 1 (PT)    Activity/Assistive Device (Bed Mobility Goal 1, PT) rolling to left;rolling to right;sit to supine;supine to sit  -AB      [place] Level/Cues Needed (Bed Mobility Goal 1, PT) independent  -AB      Time Frame (Bed Mobility Goal 1, PT) long term goal (LTG);10 days  -AB      Progress/Outcomes (Bed Mobility Goal 1, PT) goal not met  -AB         Transfer Goal 1 (PT)    Activity/Assistive Device (Transfer Goal 1, PT) sit-to-stand/stand-to-sit;bed-to-chair/chair-to-bed;toilet;car transfer;wheelchair transfer;walk-in shower  -AB      [place] Level/Cues Needed (Transfer Goal 1, PT) independent  -AB      Time Frame (Transfer Goal 1, PT) long term goal (LTG);10 days  -AB      Progress/Outcome (Transfer Goal 1, PT) goal not met  -AB         Gait Training Goal 1 (PT)    Activity/Assistive Device (Gait Training Goal 1, PT) gait (walking locomotion);decrease asymmetrical patterns;decrease fall risk;diminish gait deviation;forward stepping;improve balance and speed;increase endurance/gait distance;increase energy conservation;normalize weight shifts  -AB      [place] Level (Gait Training Goal 1, PT) independent  -AB      Distance (Gait Training Goal 1, PT) 200' while demo normalized swing phase/steppage gait  -AB      Time Frame (Gait Training Goal 1, PT) long term goal (LTG);10 days  -AB      Progress/Outcome (Gait Training Goal 1, PT) goal not met  -AB                User Key  (r) =  Recorded By, (t) = Taken By, (c) = Cosigned By      Initials Name Provider Type Discipline    AB Michell Bajwa, PTA Physical Therapist Assistant PT                        PT Discharge Summary  Anticipated Discharge Disposition (PT): home with assist, home with outpatient therapy services  Reason for Discharge: Discharge from facility  Outcomes Achieved: Refer to plan of care for updates on goals achieved, Discharge from facility occurred on same date as evluation  Discharge Destination: Home with assist      Michell Bajwa PTA   9/6/2024

## 2024-09-06 NOTE — OUTREACH NOTE
Call Center TCM Note      Flowsheet Row Responses   [facility] patient discharged from? [place]   Does the patient have one of the following disease processes/diagnoses(primary or secondary)? Stroke   TCM attempt successful? Yes  [No one listed on VR]   Call start time 1503   Call end time 1509   Meds reviewed with patient/caregiver? Yes   Is the patient having any side effects they believe may be caused by any medication additions or changes? No   Does the patient have all medications ordered at discharge? Yes   Is the patient taking all medications as directed (includes completed medication regime)? Yes   Comments [facility] d/c follow up 9/12/24@0930.   Does the patient have an appointment with their PCP within 7-14 days of discharge? Yes   Has home health visited the patient within 72 hours of discharge? N/A   Psychosocial issues? No   Does the patient require any assistance with activities of daily living such as eating, bathing, dressing, walking, etc.? Yes   ADL comments Dtr assisting patient with ADL's   Does the patient have any residual symptoms from stroke/TIA? Yes   Residual symptoms comments Speech difficulty   Did the patient receive a copy of their discharge instructions? Yes   Nursing interventions Reviewed instructions with patient   What is the patient's perception of their health status since discharge? Improving   Nursing interventions Nurse provided patient education   Is the patient/caregiver able to teach back the risk factors for a stroke? High blood pressure-goal below 120/80, High Cholesterol   Is the patient/caregiver able to teach back signs and symptoms related to disease process for when to call PCP? Yes   Is the patient/caregiver able to teach back signs and symptoms related to disease process for when to call 911? Yes   If the patient is a current smoker, are they able to teach back resources for cessation? Not a smoker   Is the patient/caregiver able to teach back the  hierarchy of who to call/visit for symptoms/problems? PCP, Specialist, Home health nurse, Urgent Care, ED, 911 Yes   Is the patient able to teach back FAST for Stroke? B alance: Watch for sudden loss of balance, E yes: Check for vision loss, F ace: Look for an uneven smile, A rm: Check if one arm is weak, S peech: Listen for slurred speech, T mimi: Call 9-1-1 right away   TCM call completed? Yes   Wrap up additional comments Patient reports mobility and speech improving, Dtr staying with patient at this time.   Call end time 1509   Would this patient benefit from a Referral to Amb Social Work? No   Is the patient interested in additional calls from an ambulatory ? No            Mansi Mendoza RN    9/6/2024, 15:11 CDT

## 2024-09-06 NOTE — THERAPY DISCHARGE NOTE
Acute Care - Occupational Therapy Discharge Summary  [facility]     Patient Name: Zabrina Contreras  : 1950  MRN: [id number]    Today's Date: 2024                 Admit Date: 2024        OT Recommendation and Plan    Visit Dx:    ICD-10-CM ICD-9-CM   1. Right sided weakness  R53.1 728.87   2. Dysarthria  R47.1 784.51   3. Palpitations  R00.2 785.1   4. Acute ischemic stroke  I63.9 434.91   5. Impaired mobility [Z74.09]  Z74.09 799.89                OT Rehab Goals       Row Name 24 1400             Transfer Goal 1 (OT)    Activity/Assistive Device (Transfer Goal 1, OT) toilet;shower chair  -JJ      [place] Level/Cues Needed (Transfer Goal 1, OT) modified independence  -JJ      Time Frame (Transfer Goal 1, OT) long term goal (LTG);10 days  -JJ      Progress/Outcome (Transfer Goal 1, OT) goal not met;discharged from facility  -JJ         Bathing Goal 1 (OT)    Activity/Device (Bathing Goal 1, OT) bathing skills, all  -JJ      [place] Level/Cues Needed (Bathing Goal 1, OT) modified independence  -JJ      Time Frame (Bathing Goal 1, OT) long term goal (LTG);10 days  -JJ      Progress/Outcomes (Bathing Goal 1, OT) goal not met;discharged from facility  -JJ         Dressing Goal 1 (OT)    Activity/Device (Dressing Goal 1, OT) dressing skills, all  -JJ      [place]/Cues Needed (Dressing Goal 1, OT) modified independence  -JJ      Time Frame (Dressing Goal 1, OT) long term goal (LTG);10 days  -JJ      Strategies/Barriers (Dressing Goal 1, OT) including manipulating fasteners  -JJ      Progress/Outcome (Dressing Goal 1, OT) goal not met;discharged from facility  -JJ                User Key  (r) = Recorded By, (t) = Taken By, (c) = Cosigned By      Initials Name Provider Type Discipline    Kaley Nolen, OTR/L, CSRS Occupational Therapist OT                                OT Discharge Summary  Anticipated Discharge Disposition (OT): inpatient rehabilitation facility, home with assist, home  with outpatient therapy services  Reason for Discharge: Discharge from facility  Outcomes Achieved: Refer to plan of care for updates on goals achieved  Discharge Destination: Home with assist      Kaley Herrera OTR/L, CSRS  9/6/2024

## 2024-09-06 NOTE — OUTREACH NOTE
Prep Survey      Flowsheet Row Responses   [place] facility patient discharged from? [place]   Is LACE score < 7 ? Yes   Eligibility [facility]   Date of Admission 09/04/24   Date of Discharge 09/05/24   Discharge Disposition Home or Self Care   Discharge diagnosis Acute ischemic stroke   Does the patient have one of the following disease processes/diagnoses(primary or secondary)? Stroke   Does the patient have Home health ordered? No   Is there a DME ordered? No   Prep survey completed? Yes            FED[place]A - Registered Nurse

## 2024-09-09 NOTE — THERAPY DISCHARGE NOTE
Acute Care - Speech Language Pathology Discharge Summary  [facility]       Patient Name: Zabrina Contreras  : 1950  MRN: [id number]    Today's Date: 2024                   Admit Date: 2024      SLP Recommendation and Plan    Visit Dx:    ICD-10-CM ICD-9-CM   1. Right sided weakness  R53.1 728.87   2. Dysarthria  R47.1 784.51   3. Palpitations  R00.2 785.1   4. Acute ischemic stroke  I63.9 434.91   5. Impaired mobility [Z74.09]  Z74.09 799.89                SLP GOALS       Row Name 24 0900             Articulation Goal 1 (SLP)    Improve Articulation Goal 1 (SLP) of specific sounds in connected speech;with minimal cues (75-90%)  -MB      Time Frame (Articulation Goal 1, SLP) by discharge  -MB      Barriers (Articulation Goal 1, SLP) n/a  -MB      Progress/Outcomes (Articulation Goal 1, SLP) goal not met  -MB                User Key  (r) = Recorded By, (t) = Taken By, (c) = Cosigned By      Initials Name Provider Type    Boyd Leos CCC-SLP Speech and Language Pathologist                    [email or web address]@      SLP Discharge Summary  Anticipated Discharge Disposition (SLP): unknown  Reason for Discharge: discharge from this facility  Progress Toward Achieving Short/long Term Goals: goals not met within established timelines  Discharge Destination: home      Boyd Donald CCC-SLP  2024

## 2024-09-10 LAB
QT INTERVAL: 414 MS
QTC INTERVAL: 437 MS

## 2024-09-12 ENCOUNTER — OFFICE VISIT (OUTPATIENT)
Dept: INTERNAL MEDICINE | Facility: CLINIC | Age: 74
End: 2024-09-12
Payer: MEDICARE

## 2024-09-12 VITALS
TEMPERATURE: 97.6 F | HEART RATE: 69 BPM | SYSTOLIC BLOOD PRESSURE: 118 MMHG | WEIGHT: 171 LBS | BODY MASS INDEX: 29.19 KG/M2 | DIASTOLIC BLOOD PRESSURE: 72 MMHG | HEIGHT: 64 IN | OXYGEN SATURATION: 97 %

## 2024-09-12 DIAGNOSIS — Q21.12 PFO (PATENT FORAMEN OVALE): ICD-10-CM

## 2024-09-12 DIAGNOSIS — E78.2 MIXED HYPERLIPIDEMIA: ICD-10-CM

## 2024-09-12 DIAGNOSIS — R53.1 RIGHT SIDED WEAKNESS: ICD-10-CM

## 2024-09-12 DIAGNOSIS — I63.81 LEFT THALAMIC INFARCTION: Primary | ICD-10-CM

## 2024-09-12 PROCEDURE — 1159F MED LIST DOCD IN RCRD: CPT | Performed by: NURSE PRACTITIONER

## 2024-09-12 PROCEDURE — 99495 TRANSJ CARE MGMT MOD F2F 14D: CPT | Performed by: NURSE PRACTITIONER

## 2024-09-12 PROCEDURE — 1111F DSCHRG MED/CURRENT MED MERGE: CPT | Performed by: NURSE PRACTITIONER

## 2024-09-12 PROCEDURE — 1160F RVW MEDS BY RX/DR IN RCRD: CPT | Performed by: NURSE PRACTITIONER

## 2024-09-12 RX ORDER — AMPICILLIN TRIHYDRATE 250 MG
2 CAPSULE ORAL DAILY
COMMUNITY

## 2024-09-12 NOTE — PROGRESS NOTES
Transitional Care Follow Up Visit    Zabrina Contreras is a 74 y.o. female who presents for a transitional care management visit.    Patient was admitted at [facility]    Admission Date: 9/4/2024    Discharge Date: 9/5/2024    Within 48 business hours after discharge [facility] Nurse Line contacted her via telephone to coordinate her care and needs.      I reviewed and discussed the details of that call along with the discharge summary, hospital problems, inpatient lab results, inpatient diagnostic studies, and consultation reports with Zabrina.     Current outpatient and discharge medications have been reconciled for the patient.  Reviewed by: Emma Patton, LG          9/5/2024     8:41 PM   Date of TCM Phone Call   [facility]   Date of Admission 9/4/2024   Date of Discharge 9/5/2024   Discharge Disposition Home or Self Care       Course During Hospital Stay:    Patient presented to the [facility] emergency department on 9/4/2024 with right sided weakness and discoordination of the right upper extremity.  She also noted slurred speech on the way to the emergency room.  CT of the head without contrast negative.  CTA of the head and neck did not reveal any acute findings.  CT cerebral perfusion negative as well.  Patient was admitted to the hospitalist service with neurology consulted.  MRI of the brain ultimately confirmed a small thalamic stroke in the left posterior internal capsule.  It was recommended for the patient to start daily aspirin and statin therapies.  Patient has been compliant with aspirin however has not been taking atorvastatin.  She and I had discussed this in great detail at previous office visits due to hyperlipidemia however she has always declined this.  She was agreeable to stop estradiol supplement as well.  Echocardiogram did show a PFO, however it was documented that the hospitalist service and neurology discussed this and felt thatpatient would  be unlikely to benefit from closure of PFO.  Zio patch recommended at time of discharge, and patient is wearing this today.  Outpatient therapy has been arranged.  Patient discharged home on 9/5/2024.    HPI:  Patient presents to the office today to follow-up on recent hospitalization as outlined above.  She feels that she is slowly getting better each day.  Please see assessment and plan below.     The following portions of the patient's history were reviewed and updated as appropriate: allergies, current medications, past family history, past medical history, past social history, past surgical history and problem list.    Objective     Past Medical History:   Diagnosis Date    Chest pain 1/6/2017    Disease of thyroid gland     Hyperlipidemia     Hypocholesteremia 1/6/2017    Hypothyroidism 1/6/2017    Microcytic anemia 1/6/2017    Otalgia 1/6/2017    Palpitations 1/6/2017      Past Surgical History:   Procedure Laterality Date    APPENDECTOMY      CHOLECYSTECTOMY      HYSTERECTOMY      OOPHORECTOMY      TONSILLECTOMY          Current Outpatient Medications:     aspirin 81 MG EC tablet, Take 1 tablet by mouth Daily., Disp: 30 tablet, Rfl: 3    levothyroxine (SYNTHROID, LEVOTHROID) 100 MCG tablet, Take 1 tablet by mouth Daily., Disp: 90 tablet, Rfl: 1    Red Yeast Rice 600 MG capsule, Take 2 capsules by mouth Daily., Disp: , Rfl:     Probiotic Product (Risaquad-2) capsule capsule, Take 1 capsule by mouth Daily. (Patient not taking: Reported on 9/12/2024), Disp: , Rfl:      Vitals:    09/12/24 0917   BP: 118/72   Pulse: 69   Temp: 97.6 °F (36.4 °C)   SpO2: 97%         09/12/24 0917   Weight: 77.6 kg (171 lb)            Physical Exam  Vitals and nursing note reviewed.   Constitutional:       General: She is not in acute distress.     Appearance: She is not ill-appearing or toxic-appearing.   HENT:      Head: Normocephalic and atraumatic.      Mouth/Throat:      Mouth: Mucous membranes are moist.      Pharynx:  Oropharynx is clear.   Cardiovascular:      Rate and Rhythm: Normal rate and regular rhythm.      Pulses: Normal pulses.      Heart sounds: Murmur heard.      Comments: ZIO patch in place  Pulmonary:      Effort: Pulmonary effort is normal.      Breath sounds: No wheezing, rhonchi or rales.   Abdominal:      General: Bowel sounds are normal. There is no distension.      Palpations: Abdomen is soft.      Tenderness: There is no abdominal tenderness.   Musculoskeletal:         General: No swelling or tenderness. Normal range of motion.      Cervical back: Normal range of motion and neck supple. No tenderness.   Skin:     General: Skin is warm and dry.      Findings: No erythema or rash.   Neurological:      General: No focal deficit present.      Mental Status: She is alert and oriented to person, place, and time.      Motor: Weakness (very mild distal weakness of RLE) present.   Psychiatric:         Mood and Affect: Mood normal.         Behavior: Behavior normal.         Thought Content: Thought content normal.         Judgment: Judgment normal.          Assessment & Plan   Diagnoses and all orders for this visit:    1. Left thalamic infarction (Primary)  -     Ambulatory Referral to Cardiology    2. Right sided weakness    3. Mixed hyperlipidemia    4. PFO (patent foramen ovale)  -     Ambulatory Referral to Cardiology       Patient hospitalized at [facility] from 9/4 through 9/5 presenting with right-sided weakness, discoordination of the right upper extremity and slurred speech.  She was found to have acute left thalamic infarct on MRI of the brain.  CTA of the head and neck were fairly unremarkable although report does mention both internal carotid arteries have a significant tortuous course.  Patient was evaluated by neurology during hospitalization.  Recommended aspirin and statin therapies.  Patient has been compliant with aspirin 81 mg daily.  She and I had previously discussed statin therapy at  length secondary to hyperlipidemia and she has always declined this as she has researched these medications in depth.  She does follow a fairly strict keto style diet.  Lipid panel drawn  during hospitalization showed total cholesterol of 242, LDL of 164 and triglycerides of 171.  This is improved from her last lipid panel in April with total cholesterol of 260 and LDL of 190.  Labs showed that these were resulted around 4 AM, but the patient does not recall having fasting labs drawn.  She is taking red yeast rice supplement, and would recommend that she continue this at least.  She has stopped estradiol supplement following hospitalization, which she was taking more for bone health and not for menopausal symptoms.    Patient was recommended to wear a Zio patch to assess for any underlying cardiac arrhythmias which could have contributed to recent CVA.  She does have this in place and is supposed to wear this for 10 days.  She does have previous problems with palpitations, but historically this occurs only when her thyroid medication needs to be adjusted.  Echocardiogram that did show evidence of a patent barriga ovale.  It was felt she likely would not benefit from closure of this, however she would like a second opinion from cardiology which I think is reasonable so we have referred her for this.    Patient reports slow improvement of right-sided weakness and discoordination of the right arm.  She does feel as though she is still having some difficulty with proprioception of the right foot but states this is better in comparison to time of onset of symptoms.  She has been trying to stay active at home.  She is scheduled to start outpatient therapy within the next 2 weeks.

## 2024-09-24 ENCOUNTER — HOSPITAL ENCOUNTER (OUTPATIENT)
Dept: PHYSICAL THERAPY | Facility: HOSPITAL | Age: 74
Setting detail: THERAPIES SERIES
Discharge: HOME | End: 2024-09-24
Payer: MEDICARE

## 2024-09-24 DIAGNOSIS — I63.81 LEFT THALAMIC INFARCTION: Primary | ICD-10-CM

## 2024-09-24 DIAGNOSIS — R53.1 RIGHT SIDED WEAKNESS: ICD-10-CM

## 2024-09-24 PROCEDURE — 97162 PT EVAL MOD COMPLEX 30 MIN: CPT

## 2024-09-30 ENCOUNTER — OFFICE VISIT (OUTPATIENT)
Dept: CARDIOLOGY | Facility: CLINIC | Age: 74
End: 2024-09-30
Payer: MEDICARE

## 2024-09-30 ENCOUNTER — HOSPITAL ENCOUNTER (OUTPATIENT)
Dept: PHYSICAL THERAPY | Facility: HOSPITAL | Age: 74
Setting detail: THERAPIES SERIES
Discharge: HOME | End: 2024-09-30
Payer: MEDICARE

## 2024-09-30 VITALS
HEART RATE: 86 BPM | OXYGEN SATURATION: 98 % | SYSTOLIC BLOOD PRESSURE: 140 MMHG | BODY MASS INDEX: 29.16 KG/M2 | HEIGHT: 64 IN | DIASTOLIC BLOOD PRESSURE: 90 MMHG | WEIGHT: 170.8 LBS

## 2024-09-30 DIAGNOSIS — I63.81 LEFT THALAMIC INFARCTION: Primary | ICD-10-CM

## 2024-09-30 DIAGNOSIS — R53.1 RIGHT SIDED WEAKNESS: Primary | ICD-10-CM

## 2024-09-30 DIAGNOSIS — Q21.12 PFO (PATENT FORAMEN OVALE): ICD-10-CM

## 2024-09-30 DIAGNOSIS — I63.81 LEFT THALAMIC INFARCTION: ICD-10-CM

## 2024-09-30 PROCEDURE — 97535 SELF CARE MNGMENT TRAINING: CPT

## 2024-09-30 PROCEDURE — 97112 NEUROMUSCULAR REEDUCATION: CPT

## 2024-09-30 PROCEDURE — 99204 OFFICE O/P NEW MOD 45 MIN: CPT | Performed by: INTERNAL MEDICINE

## 2024-09-30 NOTE — PROGRESS NOTES
Subjective:     Encounter Date:09/30/2024      Patient ID: Zabrina Contreras is a 74 y.o. female who presents to discuss possible PFO closure after prior stroke.    Referring provider: LG Calvillo  Reason for referral: Left thalamic infarction; patent foramen ovale    Chief Complaint: Here to discuss possible PFO closure    History of Present Illness    This is a 74-year-old female who presents today to discuss possible PFO closure.  Patient was found to have a patent foramen ovale and the workup for a stroke.  On 9/4/24, the patient complained of right-sided weakness and went to the emergency department.  Ultimately, CT scan showed no acute abnormalities.  CT scan of the head and neck showed tortuous carotid arteries with no evidence of significant stenosis.  An MRI then did demonstrate an acute left thalamic lacunar infarct.  Patient was evaluated by neurology in the hospital.  Aspirin was held upon his medical therapy.  The patient did not desire to take statin therapy.  The patient was discharged home in stable condition.  She says that most of her neurologic deficits have now resolved.  She is working with therapy and doing well.  No significant residual neurologic symptoms.  Blood pressure has been somewhat elevated.  The patient says that she is hesitant to take antihypertensive therapies and that in general, blood pressure is well-controlled for the most part.  She denies having any issues with aspirin so far.  Patient does note some intermittent palpitations, particular at night when she feels like her heart is beating harder than normal.  No other cardiac symptoms, however.  She denies having chest pain.  Breathing is stable.    The following portions of the patient's history were reviewed and updated as appropriate: allergies, current medications, past family history, past medical history, past social history, past surgical history, and problem list.    Past Medical History:   Diagnosis Date     Disease of thyroid gland     Hypocholesteremia 2017    Hypothyroidism 2017    Microcytic anemia 2017    Otalgia 2017    Palpitations 2017    Stroke      Past Surgical History:   Procedure Laterality Date    APPENDECTOMY      CHOLECYSTECTOMY      HYSTERECTOMY      OOPHORECTOMY      TONSILLECTOMY         Current Outpatient Medications:     aspirin 81 MG EC tablet, Take 1 tablet by mouth Daily., Disp: 30 tablet, Rfl: 3    levothyroxine (SYNTHROID, LEVOTHROID) 100 MCG tablet, Take 1 tablet by mouth Daily., Disp: 90 tablet, Rfl: 1    Red Yeast Rice 600 MG capsule, Take 2 capsules by mouth Daily., Disp: , Rfl:     Allergies   Allergen Reactions    Demerol  [Meperidine Hcl] Unknown - High Severity    Meperidine Other (See Comments)     Blood pressure drops, she is still able to use this.     Codeine Nausea And Vomiting     Nausea, vomiting, dizziness     Social History     Tobacco Use    Smoking status: Former     Current packs/day: 0.00     Average packs/day: 1 pack/day for 19.0 years (19.0 ttl pk-yrs)     Types: Cigarettes     Start date:      Quit date:      Years since quittin.7    Smokeless tobacco: Never    Tobacco comments:     Quit 25 years ago   Substance Use Topics    Alcohol use: Yes     Comment: occasional     Family History   Problem Relation Age of Onset    COPD Mother     No Known Problems Father     Breast cancer Neg Hx      Review of Systems   Cardiovascular:  Positive for palpitations. Negative for chest pain, dyspnea on exertion, leg swelling, orthopnea, paroxysmal nocturnal dyspnea and syncope.   Respiratory:  Negative for cough, shortness of breath and wheezing.    Gastrointestinal:  Negative for abdominal pain, nausea and vomiting.   Neurological:  Negative for dizziness and light-headedness.   All other systems reviewed and are negative.      Procedures: None today but I reviewed the ECG from 2024 showing normal sinus rhythm, ventricular rate 67.  No  "significant ST segment changes.  No pathologic findings.       Objective:     Vitals reviewed.   Constitutional:       General: Not in acute distress.     Appearance: Healthy appearance. Well-developed and not in distress.   Eyes:      Extraocular Movements: Extraocular movements intact.      Pupils: Pupils are equal, round, and reactive to light.   HENT:      Head: Normocephalic and atraumatic.    Mouth/Throat:      Pharynx: Oropharynx is clear.   Neck:      Vascular: No carotid bruit or JVD.   Pulmonary:      Effort: Pulmonary effort is normal.      Breath sounds: Normal breath sounds. No wheezing. No rhonchi. No rales.   Cardiovascular:      Normal rate. Regular rhythm.      Murmurs: There is no murmur.      No gallop.    Pulses:     Intact distal pulses.   Edema:     Peripheral edema absent.   Abdominal:      General: Bowel sounds are normal. There is no distension.      Palpations: Abdomen is soft.      Tenderness: There is no abdominal tenderness.   Musculoskeletal: Normal range of motion.      Extremities: No clubbing present.Skin:     General: Skin is warm and dry. There is no cyanosis.      Findings: No erythema or rash.   Neurological:      Mental Status: Alert. Mental status is at baseline.       /90   Pulse 86   Ht 162.6 cm (64\")   Wt 77.5 kg (170 lb 12.8 oz)   LMP  (LMP Unknown)   SpO2 98%   BMI 29.32 kg/m²     Data/Lab Review:     Lab Results   Component Value Date    WBC 10.43 09/05/2024    HGB 12.9 09/05/2024    HCT 40.6 09/05/2024    MCV 80.7 09/05/2024     09/05/2024     Lab Results   Component Value Date    GLUCOSE 95 09/05/2024    CALCIUM 8.7 09/05/2024     09/05/2024    K 3.9 09/05/2024    CO2 23.0 09/05/2024     09/05/2024    BUN 13 09/05/2024    CREATININE 0.54 (L) 09/05/2024    EGFRRESULT 92.3 04/01/2024    EGFR 95.6 09/05/2024    BCR 24.1 09/05/2024    ANIONGAP 10.0 09/05/2024     Lab Results   Component Value Date    CHOL 242 (H) 09/05/2024    CHLPL 260 (H) " 04/01/2024    TRIG 171 (H) 09/05/2024    HDL 47 09/05/2024     (H) 09/05/2024     MRI brain on 9/5/2024: Acute left thalamic lacunar infarct. No significant mass effect or hemorrhagic conversion.    Results for orders placed during the hospital encounter of 09/04/24    Adult Transthoracic Echo Complete W/ Cont if Necessary Per Protocol    Interpretation Summary    Left ventricular systolic function is hyperdynamic (EF > 70%). Left ventricular ejection fraction appears to be greater than 70%.    Left ventricular wall thickness is consistent with hypertrophy. Sigmoid-shaped ventricular septum is present.    Left ventricular diastolic function is consistent with (grade I) impaired relaxation.    Estimated right ventricular systolic pressure from tricuspid regurgitation is normal (<35 mmHg).    Normal size and function of the right ventricle.    No significant (worse than mild) valvular pathology.    Saline test results are positive with valsalva manuever, indicating moderate patent foramen ovale is present with right-to-left shunting.    No previous studies available for comparison.    **I did personally view the images associated with the echocardiogram noted above.  I have confirmed the findings of a patent foramen ovale noted by the saline contrast bubble study.  No significant valvular abnormalities identified.  Normal left ventricular and right ventricular systolic function noted.    Cardiac monitor from 9/6/2024, worn for 9 days and 18 hours:    A relatively benign monitor study.    The predominant rhythm is sinus rhythm with average heart rate of 71 bpm.    Rare PACs and PVCs with no pathologic prolonged arrhythmias.    No prolonged pauses.    No symptoms reported.      Assessment:          Diagnosis Plan   1. Left thalamic infarction        2. PFO (patent foramen ovale)               Plan:       1.  Patent foramen ovale: The patient was found have a patent foramen ovale in the workup for stroke.   However, the stroke was described as a lacunar thalamic stroke, likely related to small vessel disease rather than definitively embolic.  The patient was evaluated by neurology in the hospital and PFO closure was not recommended.  I would support this recommendation at this time.  We did discuss the mechanism of PFO and its implication and stroke, however we also discussed that PFO closure might not particularly carry a large benefit in the current situation.  We did discuss that if another cortical stroke is ever identified, then PFO closure could certainly be considered if no other obvious cause of stroke is discovered.  The patient says that she was relieved with the information and had no further questions.    2.  Left thalamic infarction: The patient does have a follow-up appoint with neurology.  Neurologically, she is improving at this time.  She does remain on aspirin therapy.    We will see the patient back in the structural heart clinic as needed at this time.

## 2024-09-30 NOTE — PROGRESS NOTES
Physical Therapy Treatment Note  [facility]  A department [place] [address], [place] [address]    Patient: Zabrina Contreras                                                 Visit Date: 2024  :     1950    Referring practitioner:    Kevin Diaz MD  Date of Initial Visit:          Type: THERAPY  Episode: R sided weakness  Number of visits this episode: 2    Visit Diagnoses:    ICD-10-CM ICD-9-CM   1. Right sided weakness  R53.1 728.87   2. Left thalamic infarction  I63.81 434.91     SUBJECTIVE     Subjective: She has to rest after doing the exercises because she can feel it in her hip.    PAIN: 0/10       OBJECTIVE     Objective       Neuromuscular Reeducation     [id number] Comments   Airex lateral/backward slider disc lunge against resistance at ankles    Airex heel taps to cone against resistance at ankles    [place] EO/EC Tendency for backward drift   Tandem walking forward/backward    TR EO/EC    Heel walking forward/backward EO/EC    Timed Minutes 30        Therapy Education/Self Care [id number]   Education offered today HEP   Medbride Code  [id number]   Ongoing HEP     Date: 2024  Prepared by: Ophelia Silva    Exercises  - Romberg Stance with Head Nods  - 2-3 x daily - 4 x weekly - 2 sets - 2 reps - 60 seconds  - Tandem Stance  - 2-3 x daily - 4 x weekly - 2 sets - 2 reps - 60 seconds  - Standing Single Leg Stance with Counter Support  - 2-3 x daily - 4 x weekly - 2 sets - 2 reps - 30 seconds  - Single Leg Balance with Clock Reach  - 1 x daily - 7 x weekly - 3 sets - 10 reps  - Braided Sidestepping  - 1 x daily - 7 x weekly - 3 sets - 10 reps  - Walking with High Knee Taps  - 1 x daily - 7 x weekly - 3 sets - 10 reps  - Tandem Walking  - 1 x daily - 7 x weekly - 3 sets - 10 reps  - Heel Walking  - 1 x daily - 7 x weekly - 3 sets - 10 reps  - Toe Walking  - 1 x daily - 7 x weekly - 3 sets - 10 reps  - Alternating Step Taps with Counter Support   - 1 x daily - 7 x weekly - 3 sets - 10 reps  - Step-Over  - 1 x daily - 7 x weekly - 3 sets - 10 reps  - Standing Hip Extension with Resistance at Ankles and Counter Support  - 1 x daily - 7 x weekly - 3 sets - 10 reps  - Standing Hip Abduction with Resistance at Ankles and Counter Support  - 1 x daily - 7 x weekly - 3 sets - 10 reps  - Heel Raises with Counter Support  - 1 x daily - 7 x weekly - 3 sets - 10 reps    Walk 30-45 mins 3-4 days/wk      Timed Minutes  15         Total Timed Treatment:     45   mins  Total Time of Visit:            45   mins     ASSESSMENT/PLAN      GOALS:  Goals                                          Progress Note due by 10/24/24                                                      Recert due by 12/22/24   LTG by: 6 weeks Comments Date Status   Demonstrate understanding of all HEP components to optimize recovery           Anticipated CPT codes: Gait Training 96517, Therapeutic Exercise 75495, Manual Therapy 65590, Therapeutic Activity 89351, Neuromuscular ReEducation 73378, and Self Care/Home Management 38283      Assessment/Plan     ASSESSMENT: She demonstrated dynamic balance deficits especially in narrow BRIDGER w/ removal of visual input. Bolstered her HEP to address dynamic components with instruction on how to progress exercises as she will likely be ready to D/C by next PN.    PLAN: progress POC per pt tolerance    SIGNATURE: Ophelia Silva, NAS DPT, [place] License #: [id number]  Electronically Signed on 9/30/2024        [place] [address]  [phone]

## 2024-10-03 ENCOUNTER — OFFICE VISIT (OUTPATIENT)
Dept: INTERNAL MEDICINE | Facility: CLINIC | Age: 74
End: 2024-10-03
Payer: MEDICARE

## 2024-10-03 VITALS
OXYGEN SATURATION: 97 % | RESPIRATION RATE: 18 BRPM | WEIGHT: 172 LBS | HEART RATE: 67 BPM | HEIGHT: 64 IN | TEMPERATURE: 97.1 F | SYSTOLIC BLOOD PRESSURE: 140 MMHG | BODY MASS INDEX: 29.37 KG/M2 | DIASTOLIC BLOOD PRESSURE: 76 MMHG

## 2024-10-03 DIAGNOSIS — Q21.12 PFO (PATENT FORAMEN OVALE): ICD-10-CM

## 2024-10-03 DIAGNOSIS — R00.2 PALPITATIONS: ICD-10-CM

## 2024-10-03 DIAGNOSIS — I63.81 LEFT THALAMIC INFARCTION: ICD-10-CM

## 2024-10-03 DIAGNOSIS — E03.9 ACQUIRED HYPOTHYROIDISM: Primary | ICD-10-CM

## 2024-10-03 DIAGNOSIS — E78.2 MIXED HYPERLIPIDEMIA: ICD-10-CM

## 2024-10-03 DIAGNOSIS — R53.1 RIGHT SIDED WEAKNESS: ICD-10-CM

## 2024-10-03 PROCEDURE — 1159F MED LIST DOCD IN RCRD: CPT

## 2024-10-03 PROCEDURE — 99213 OFFICE O/P EST LOW 20 MIN: CPT

## 2024-10-03 PROCEDURE — 1126F AMNT PAIN NOTED NONE PRSNT: CPT

## 2024-10-03 PROCEDURE — 1160F RVW MEDS BY RX/DR IN RCRD: CPT

## 2024-10-03 NOTE — PROGRESS NOTES
"        Subjective     Chief Complaint:  Follow-up hyperlipidemia, hypothyroidism    HPI:  Patient presents today for follow-up on hyperlipidemia and hypothyroidism. Please see assessment and plan below.    Past Medical History:   Past Medical History:   Diagnosis Date    Disease of thyroid gland     Hypocholesteremia 01/06/2017    Hypothyroidism 01/06/2017    Microcytic anemia 01/06/2017    Otalgia 01/06/2017    Palpitations 01/06/2017    Stroke      Past Surgical History:  Past Surgical History:   Procedure Laterality Date    APPENDECTOMY      CHOLECYSTECTOMY      HYSTERECTOMY      OOPHORECTOMY      TONSILLECTOMY         Allergies:  Allergies   Allergen Reactions    Demerol  [Meperidine Hcl] Unknown - High Severity    Meperidine Other (See Comments)     Blood pressure drops, she is still able to use this.     Codeine Nausea And Vomiting     Nausea, vomiting, dizziness     Medications:  Prior to Admission medications    Medication Sig Start Date End Date Taking? Authorizing Provider   aspirin 81 MG EC tablet Take 1 tablet by mouth Daily. 9/6/24  Yes Kevin Diaz MD   Digestive Enzymes (DIGESTIVE ENZYME PO) Take 1 tablet by mouth As Needed.   Yes Malgorzata Ocampo MD   levothyroxine (SYNTHROID, LEVOTHROID) 100 MCG tablet Take 1 tablet by mouth Daily. 6/14/24  Yes Jenny Wu APRN   Red Yeast Rice 600 MG capsule Take 2 capsules by mouth Daily.   Yes Malgorzata Ocampo MD       Objective     Vital Signs: /76 (BP Location: Left arm, Patient Position: Sitting, Cuff Size: Adult)   Pulse 67   Temp 97.1 °F (36.2 °C) (Infrared)   Resp 18   Ht 162.6 cm (64\")   Wt 78 kg (172 lb)   LMP  (LMP Unknown)   SpO2 97%   BMI 29.52 kg/m²   Physical Exam  Vitals and nursing note reviewed.   Constitutional:       General: She is not in acute distress.     Appearance: Normal appearance.   Cardiovascular:      Rate and Rhythm: Normal rate and regular rhythm.      Pulses: Normal pulses.      Heart sounds: " Normal heart sounds. No murmur heard.  Pulmonary:      Effort: Pulmonary effort is normal. No respiratory distress.      Breath sounds: Normal breath sounds.   Abdominal:      General: Bowel sounds are normal. There is no distension.      Palpations: Abdomen is soft.      Tenderness: There is no abdominal tenderness.   Musculoskeletal:         General: Normal range of motion.   Skin:     General: Skin is warm and dry.      Capillary Refill: Capillary refill takes less than 2 seconds.      Findings: No bruising, erythema or rash.   Neurological:      Mental Status: She is alert and oriented to person, place, and time. Mental status is at baseline.      Motor: Weakness (mild right sided, improving) present.       Results Reviewed:  Reviewed CBC, BMP, lipid panel, hemoglobin A1c obtained on 9/5.    Assessment / Plan     Assessment/Plan:  Diagnoses and all orders for this visit:    1. Acquired hypothyroidism (Primary)    2. Mixed hyperlipidemia    3. Palpitations    4. PFO (patent foramen ovale)    5. Left thalamic infarction    6. Right sided weakness       Patient presents today for 6-month follow-up on hyperlipidemia and hypothyroidism.    She saw Dr. Garcia on 9/30/2024 for patent foramen ovale.  This was an incidental finding from stroke workup.  It was felt that her stroke was likely related to small vessel disease as opposed to embolic.  PFO closure was not recommended.    She has an appointment with neurology on 11/25 for follow-up on left thalamic stroke.  She remains on aspirin but has declined statin therapy.  She takes red yeast rice.  She continues to have some mild right-sided weakness which seems to be improving.    Patient has been having some palpitations lately.  She states that her TSH is usually abnormal when she has palpitations.  This was checked about 4 weeks ago and was normal.  Encouraged her to reach out if palpitations persist and we can consider rechecking TSH.  She would not require an  office visit for this.    Blood pressure is elevated today at 140/76.  This is abnormal for her.  Her blood pressure was 118/72 when she was seen in the clinic by LG Petit.  When she saw Dr. Garcia it was 140/90.  She does keep an eye on her blood pressure at home and states that it has been running more elevated than usual.  She has been under stress at home.  We discussed that if her systolic blood pressure remains elevated for 3+ months we may need to consider starting a low-dose medication.  She tries to avoid prescription medication if possible which is understandable.    She will follow-up in 6 months for Medicare wellness visit or sooner if needed.    Return in about 1 year (around 10/3/2025) for Medicare Wellness. unless patient needs to be seen sooner or acute issues arise.    I have discussed the patient results/orders and and plan/recommendation with them at today's visit.      LG Javed   10/03/2024

## 2024-10-09 ENCOUNTER — HOSPITAL ENCOUNTER (OUTPATIENT)
Dept: PHYSICAL THERAPY | Facility: HOSPITAL | Age: 74
Setting detail: THERAPIES SERIES
Discharge: HOME | End: 2024-10-09
Payer: MEDICARE

## 2024-10-09 DIAGNOSIS — R53.1 RIGHT SIDED WEAKNESS: Primary | ICD-10-CM

## 2024-10-09 DIAGNOSIS — I63.81 LEFT THALAMIC INFARCTION: ICD-10-CM

## 2024-10-09 PROCEDURE — 97112 NEUROMUSCULAR REEDUCATION: CPT

## 2024-10-09 PROCEDURE — 97140 MANUAL THERAPY 1/> REGIONS: CPT

## 2024-10-09 PROCEDURE — 97116 GAIT TRAINING THERAPY: CPT

## 2024-10-09 NOTE — PROGRESS NOTES
Physical Therapy Treatment Note  [facility]  A department [facility]  [address], [place] [address]    Patient: Zabrina Contreras                                                 Visit Date: 10/9/2024  :     1950    Referring practitioner:    Kevin Diaz MD  Date of Initial Visit:          Type: THERAPY  Episode: R sided weakness  Number of visits this episode: 3    Visit Diagnoses:    ICD-10-CM ICD-9-CM   1. Right sided weakness  R53.1 728.87   2. Left thalamic infarction  I63.81 434.91       SUBJECTIVE     Subjective: States she has been doing good since lat time, noting she does the HEP every other day d/t her hips getting sore.     PAIN: 0/10      OBJECTIVE     Objective       Neuromuscular Reeducation     [id number] Comments   NBOS on airex EO/EC    Tandem on airex EO/EC    Marching w/ RTB around feet w/ 3 sec hold at top    Timed Minutes 18      Gait Training          13781   Task/Terrain Asst AD Comments   Fwd/bwd walking   50 ft x2 w/ RIP  resistance   Sidestepping    50 ft x2 w/ RIP  resistance         Timed Minutes 14     Manual Therapy     62103  Comments   B HS/piriformis stretches    B hip IR/ER    BLE LAD        Timed Minutes 12        Therapy Education/Self Care [id number]   Education offered today HEP   Medbride Code  [id number]   Ongoing HEP     Date: 2024  Prepared by: Ophelia Silva    Exercises  - Romberg Stance with Head Nods  - 2-3 x daily - 4 x weekly - 2 sets - 2 reps - 60 seconds  - Tandem Stance  - 2-3 x daily - 4 x weekly - 2 sets - 2 reps - 60 seconds  - Standing Single Leg Stance with Counter Support  - 2-3 x daily - 4 x weekly - 2 sets - 2 reps - 30 seconds  - Single Leg Balance with Clock Reach  - 1 x daily - 7 x weekly - 3 sets - 10 reps  - Braided Sidestepping  - 1 x daily - 7 x weekly - 3 sets - 10 reps  - Walking with High Knee Taps  - 1 x daily - 7 x weekly - 3 sets - 10 reps  - Tandem Walking  - 1 x daily - 7 x  weekly - 3 sets - 10 reps  - Heel Walking  - 1 x daily - 7 x weekly - 3 sets - 10 reps  - Toe Walking  - 1 x daily - 7 x weekly - 3 sets - 10 reps  - Alternating Step Taps with Counter Support  - 1 x daily - 7 x weekly - 3 sets - 10 reps  - Step-Over  - 1 x daily - 7 x weekly - 3 sets - 10 reps  - Standing Hip Extension with Resistance at Ankles and Counter Support  - 1 x daily - 7 x weekly - 3 sets - 10 reps  - Standing Hip Abduction with Resistance at Ankles and Counter Support  - 1 x daily - 7 x weekly - 3 sets - 10 reps  - Heel Raises with Counter Support  - 1 x daily - 7 x weekly - 3 sets - 10 reps    Walk 30-45 mins 3-4 days/wk      Timed Minutes           Total Timed Treatment:     44   mins  Total Time of Visit:            44   mins     ASSESSMENT/PLAN      GOALS:  Goals                                          Progress Note due by 10/24/24                                                      Recert due by 12/22/24   LTG by: 6 weeks Comments Date Status   Demonstrate understanding of all HEP components to optimize recovery             Assessment/Plan     ASSESSMENT: Pt reported no real problems since last visit, noting she worked around the house yesterday and was a little sore going to bed. She presented with decreased stability on airex, and required min/mod assistance to maintain balance with EC. She noted BLE fatigue a few times throughout treatment, and was given rest breaks.     PLAN: progress POC per pt tolerance    SIGNATURE: John Horne PTA, [place] License #: [id number]  Electronically Signed on 10/9/2024        [address] [place] [address]. [address]  [phone]

## 2024-10-16 ENCOUNTER — HOSPITAL ENCOUNTER (OUTPATIENT)
Dept: PHYSICAL THERAPY | Facility: HOSPITAL | Age: 74
Setting detail: THERAPIES SERIES
Discharge: HOME | End: 2024-10-16
Payer: MEDICARE

## 2024-10-16 DIAGNOSIS — R53.1 RIGHT SIDED WEAKNESS: Primary | ICD-10-CM

## 2024-10-16 DIAGNOSIS — I63.81 LEFT THALAMIC INFARCTION: ICD-10-CM

## 2024-10-16 PROCEDURE — 97112 NEUROMUSCULAR REEDUCATION: CPT

## 2024-10-16 NOTE — THERAPY TREATMENT NOTE
Physical Therapy Treatment Note  [facility]patient Therapy [facility]  A department [place] [address], [place] [address]    Patient: Zabrina Contreras                                                 Visit Date: 10/16/2024  :     1950    Referring practitioner:    Kevin Diaz MD  Date of Initial Visit:          Type: THERAPY  Episode: R sided weakness  Number of visits this episode: 4    Visit Diagnoses:    ICD-10-CM ICD-9-CM   1. Right sided weakness  R53.1 728.87   2. Left thalamic infarction  I63.81 434.91     SUBJECTIVE     Subjective:She cleaned her shed and her B hips and legs are sore      PAIN: 2/10         OBJECTIVE     Objective       Neuromuscular Reeducation     72189 Comments   Limits of Stability and Rhythmic WS'ing testing on Neurocom                    Timed Minutes 42      Therapy Education/Self Care 27251   Education offered today    Medvondae Code    Ongoing HEP   [id number]   Date: 2024  Prepared by: Ophelia Silva     Exercises  - Romberg Stance with Head Nods  - 2-3 x daily - 4 x weekly - 2 sets - 2 reps - 60 seconds  - Tandem Stance  - 2-3 x daily - 4 x weekly - 2 sets - 2 reps - 60 seconds  - Standing Single Leg Stance with Counter Support  - 2-3 x daily - 4 x weekly - 2 sets - 2 reps - 30 seconds  - Single Leg Balance with Clock Reach  - 1 x daily - 7 x weekly - 3 sets - 10 reps  - Braided Sidestepping  - 1 x daily - 7 x weekly - 3 sets - 10 reps  - Walking with High Knee Taps  - 1 x daily - 7 x weekly - 3 sets - 10 reps  - Tandem Walking  - 1 x daily - 7 x weekly - 3 sets - 10 reps  - Heel Walking  - 1 x daily - 7 x weekly - 3 sets - 10 reps  - Toe Walking  - 1 x daily - 7 x weekly - 3 sets - 10 reps  - Alternating Step Taps with Counter Support  - 1 x daily - 7 x weekly - 3 sets - 10 reps  - Step-Over  - 1 x daily - 7 x weekly - 3 sets - 10 reps  - Standing Hip Extension with Resistance at Ankles and Counter Support  - 1 x daily - 7 x weekly - 3  sets - 10 reps  - Standing Hip Abduction with Resistance at Ankles and Counter Support  - 1 x daily - 7 x weekly - 3 sets - 10 reps  - Heel Raises with Counter Support  - 1 x daily - 7 x weekly - 3 sets - 10 reps     Walk 30-45 mins 3-4 days/wk   Timed Minutes        Total Timed Treatment:     42   mins  Total Time of Visit:             42   mins         ASSESSMENT/PLAN     GOALS  Goals                                          Progress Note due by 10/24/24                                                      Recert due by 12/22/24   LTG by: 6 weeks Comments Date Status   Demonstrate understanding of all HEP components to optimize recovery  reports going well  10/16  Ongoing     Anticipated CPT codes: Gait Training 20444, Therapeutic Exercise 34699, Manual Therapy 51582, Therapeutic Activity 47102, Neuromuscular ReEducation 12814, Self Care/Home Management 48854, Estim Attended 55621, and Estim Unattended [id number]      Assessment/Plan     ASSESSMENT:   Neurocom testing results were good overall and there were no indications for increased fall risk. However, she has some deficits in regard to posterior WS'ing and a wide variance in reaction time.      PLAN:   Consider utilizing the Fitter, 1/2 foam roller, and wobble board for A<>P WS'ing training    SIGNATURE: Shalom Thomas, [facility], [place] License #: [id number]  Electronically Signed on 10/16/2024        [address] [place] [address]  [phone]     Benzoyl Peroxide Pregnancy And Lactation Text: This medication is Pregnancy Category C. It is unknown if benzoyl peroxide is excreted in breast milk.

## 2024-10-23 ENCOUNTER — HOSPITAL ENCOUNTER (OUTPATIENT)
Dept: PHYSICAL THERAPY | Facility: HOSPITAL | Age: 74
Setting detail: THERAPIES SERIES
Discharge: HOME | End: 2024-10-23
Payer: MEDICARE

## 2024-10-23 DIAGNOSIS — I63.81 LEFT THALAMIC INFARCTION: ICD-10-CM

## 2024-10-23 DIAGNOSIS — R53.1 RIGHT SIDED WEAKNESS: Primary | ICD-10-CM

## 2024-10-23 PROCEDURE — 97112 NEUROMUSCULAR REEDUCATION: CPT

## 2024-10-23 NOTE — PROGRESS NOTES
" Physical Therapy Treatment Note, 30 Day Progress Note, and Discharge Note  [facility]  [address] [address], [place] [address]    Patient: Zabrina Contreras                                                 Visit Date: 10/23/2024  :     1950    Referring practitioner:    Kevin Diaz MD  Date of Initial Visit:          Type: THERAPY  Episode: R sided weakness  Number of visits this episode: 5    Visit Diagnoses:    ICD-10-CM ICD-9-CM   1. Right sided weakness  R53.1 728.87   2. Left thalamic infarction  I63.81 434.91     SUBJECTIVE     Subjective: She feels improved with regards to her balance and AROM. Her legs get tired easy.    PAIN: 2/10 \"tightness in the legs\"       OBJECTIVE     Objective     6 MWT:  on 6-20 PAMELA RPE scale, no AD, decreasing RLE heel strike with fatigue  5x STS 18\" surface height: 10.4 secs  TU.12 secs    Mini-BEST test:   Anticipatory:  SIT TO STAND: (2) Normal: : Comes to stand without use of hands and stabilizes independently.  RISE TO TOES: (2) Normal: Stable for 3 s with maximum height.   LEFT STAND ON ONE LEG: Time in Seconds Trial 1: 20 Trial 2:    and (2) Normal: 20 s.  RIGHT STAND ON ONE LEG: Time in Seconds Trial 1: 5 Trial 2: 5  and (1) Moderate: < 20 s.   Reactive Postural Control:    Forward - COMPENSATORY STEPPING CORRECTION: (2) Normal: Recovers independently with a single, large step (second realignment step is allowed).    Backward - COMPENSATORY STEPPING CORRECTION: (2) Normal: Recovers independently with a single, large step (second realignment step is allowed).    Lateral - Compensatory Stepping Correction (2) Normal: Recovers independently with 1 step  (crossover or lateral OK)  Sensory Orientation:    Feet together, EO, Firm surface: STANCE:  (2) Normal: 30 s.      Feet together, EC, Foam surface: STANCE:  (2) Normal: 30 s.      Incline, EC:  (2) Normal: Stands independently 30 " s and aligns with gravity.    Dynamic Gait:     CHANGE IN GAIT SPEED: (2) Normal: Significantly changes walking speed without imbalance.      WALK WITH HEAD TURNS - HORIZONTAL: (2) Normal: performs head turns with no change in gait speed and good balance.      WALK WITH PIVOT TURNS: (2) Normal: Turns with feet close FAST (< 3 steps) with good balance.     STEP OVER OBSTACLES: (2) Normal: Able to step over box with minimal change of gait speed and with good balance.     TU.12 sec; Dual Task TU.91 sec         (2) Normal: No noticeable change in sitting, standing or walking while backward counting when compared to TUG without  Dual Task.       Neuromuscular Reeducation     78820 Comments   miniBEST    6 MWT    5 x STS    Timed Minutes 38       Therapy Education/Self Care 95989   Education offered today     Medbride Code     Ongoing HEP     Date: 10/23/2024  Prepared by: Ophelia Silva    Exercises  - Romberg Stance with Head Nods  - 2-3 x daily - 4 x weekly - 2 sets - 2 reps - 60 seconds  - Tandem Stance  - 2-3 x daily - 4 x weekly - 2 sets - 2 reps - 60 seconds  - Standing Single Leg Stance with Counter Support  - 2-3 x daily - 4 x weekly - 2 sets - 2 reps - 30 seconds  - Single Leg Balance with Clock Reach  - 1 x daily - 7 x weekly - 3 sets - 10 reps  - Braided Sidestepping  - 1 x daily - 7 x weekly - 3 sets - 10 reps  - Walking with High Knee Taps  - 1 x daily - 7 x weekly - 3 sets - 10 reps  - Tandem Walking  - 1 x daily - 7 x weekly - 3 sets - 10 reps  - Heel Walking  - 1 x daily - 7 x weekly - 3 sets - 10 reps  - Toe Walking  - 1 x daily - 7 x weekly - 3 sets - 10 reps  - Alternating Step Taps with Counter Support  - 1 x daily - 7 x weekly - 3 sets - 10 reps  - Step-Over  - 1 x daily - 7 x weekly - 3 sets - 10 reps  - Standing Hip Extension with Resistance at Ankles and Counter Support  - 1 x daily - 7 x weekly - 3 sets - 10 reps  - Standing Hip Abduction with Resistance at Ankles and Counter Support  - 1  x daily - 7 x weekly - 3 sets - 10 reps  - Heel Raises with Counter Support  - 1 x daily - 7 x weekly - 3 sets - 10 reps  - Single Leg Sit to Stand with Arms Crossed  - 1 x daily - 4 x weekly - 2-3 sets - 10 reps     Walk 30-45 mins 3-4 days/wk   Timed Minutes           Total Timed Treatment:     38   mins  Total Time of Visit:             38   mins         ASSESSMENT/PLAN      GOALS  Goals                                          Progress Note due by 10/24/24                                                      Recert due by 12/22/24   LTG by: 6 weeks Comments Date Status   Demonstrate understanding of all HEP components to optimize recovery    10/23  MET      Anticipated CPT codes: Gait Training 63637, Therapeutic Exercise 28656, Manual Therapy 81026, Therapeutic Activity 62287, Neuromuscular ReEducation 28253, Self Care/Home Management 75111, Estim Attended 11253, and Estim Unattended 05308      Assessment/Plan    Assessment  Impairments: abnormal gait, abnormal muscle firing, impaired balance, impaired physical strength and lacks appropriate home exercise program   Functional limitations: carrying objects, walking, standing and reaching overhead   Prognosis: good     Plan  Therapy options: will be seen for skilled therapy services  Planned therapy interventions: abdominal trunk stabilization, body mechanics training, functional ROM exercises, gait training, home exercise program, manual therapy, motor coordination training, neuromuscular re-education, postural training, soft tissue mobilization, strengthening, stretching, therapeutic activities and transfer training  Frequency: 1x week  Duration in weeks: 6  Treatment plan discussed with: patient       ASSESSMENT: Progress note performed per POC. She scored within age/sex-matched norms for each core functional outcome measure performed today. She is confident in her ability to correctly execute HEP and walking program. As such, she is no longer indicated for  skilled OP PT services at this time.    PLAN: D/C    SIGNATURE: Ophelia Silva, PT DPT, [place] License #: [id number]  Electronically Signed on 10/23/2024        [address] [address]  [phone]

## 2024-10-30 ENCOUNTER — APPOINTMENT (OUTPATIENT)
Dept: PHYSICAL THERAPY | Facility: HOSPITAL | Age: 74
End: 2024-10-30
Payer: MEDICARE

## 2024-11-11 RX ORDER — LEVOTHYROXINE SODIUM 100 UG/1
100 TABLET ORAL DAILY
Qty: 90 TABLET | Refills: 3 | Status: SHIPPED | OUTPATIENT
Start: 2024-11-11

## 2024-11-25 ENCOUNTER — OFFICE VISIT (OUTPATIENT)
Dept: NEUROLOGY | Facility: CLINIC | Age: 74
End: 2024-11-25
Payer: MEDICARE

## 2024-11-25 VITALS
WEIGHT: 174.2 LBS | BODY MASS INDEX: 29.74 KG/M2 | DIASTOLIC BLOOD PRESSURE: 90 MMHG | SYSTOLIC BLOOD PRESSURE: 120 MMHG | HEART RATE: 72 BPM | RESPIRATION RATE: 12 BRPM | HEIGHT: 64 IN

## 2024-11-25 DIAGNOSIS — R53.1 RIGHT SIDED WEAKNESS: ICD-10-CM

## 2024-11-25 DIAGNOSIS — I63.81 LEFT THALAMIC INFARCTION: Primary | ICD-10-CM

## 2024-11-25 DIAGNOSIS — Q21.12 PFO (PATENT FORAMEN OVALE): ICD-10-CM

## 2024-12-31 NOTE — PROGRESS NOTES
Subjective   Zabrina Contreras is a 74 y.o. female is here today for follow-up after being hospitalized recently for stroke.    History of Present Illness  The patient was admitted overnight September 5, 2024 with stroke symptoms causing some dysarthria and right-sided weakness which improved overnight.  She had multiple studies which we reviewed with her today recommendations regarding blood pressure and hyperlipidemia management as well as antiplatelet therapy with aspirin were given.  The patient returns today feeling fairly well having returned to most of her normal activities.       The following portions of the patient's history were reviewed and updated as appropriate: allergies, current medications, past family history, past medical history, past social history, past surgical history and problem list.    Review of Systems   Constitutional:  Positive for activity change and fatigue.   HENT: Negative.     Eyes: Negative.    Respiratory: Negative.     Cardiovascular:  Positive for palpitations.   Gastrointestinal: Negative.    Musculoskeletal: Negative.    Neurological:  Positive for speech difficulty, weakness and numbness.   Psychiatric/Behavioral: Negative.           Current Outpatient Medications:     aspirin 81 MG EC tablet, Take 1 tablet by mouth Daily., Disp: 30 tablet, Rfl: 3    Digestive Enzymes (DIGESTIVE ENZYME PO), Take 1 tablet by mouth As Needed., Disp: , Rfl:     levothyroxine (SYNTHROID, LEVOTHROID) 100 MCG tablet, TAKE 1 TABLET EVERY DAY, Disp: 90 tablet, Rfl: 3    Red Yeast Rice 600 MG capsule, Take 2 capsules by mouth Daily., Disp: , Rfl:      Objective   Physical Exam  Vitals and nursing note reviewed.   Eyes:      General: Vision grossly intact. Gaze aligned appropriately.   Cardiovascular:      Rate and Rhythm: Normal rate.   Pulmonary:      Effort: Pulmonary effort is normal.   Neurological:      Mental Status: She is alert and oriented to person, place, and time.      GCS: GCS eye subscore is  4. GCS verbal subscore is 5. GCS motor subscore is 6.      Cranial Nerves: Cranial nerves 2-12 are intact.      Sensory: Sensation is intact.      Motor: Motor function is intact.      Coordination: Coordination is intact.      Gait: Gait is intact.      Deep Tendon Reflexes: Reflexes are normal and symmetric.   Psychiatric:         Attention and Perception: Attention normal.         Mood and Affect: Mood normal.         Speech: Speech normal.         Behavior: Behavior normal.         Cognition and Memory: Cognition normal.     Hemoglobin A1c (09/05/2024 04:19)    Mildly elevated at 5.6    Basic Metabolic Panel (09/05/2024 04:19)    No clinically significant abnormality    Lipid Panel (09/05/2024 04:19)    Hyperlipidemia with an LDL of 164    Adult Transthoracic Echo Complete W/ Cont if Necessary Per Protocol (09/05/2024 16:34)    PFO with moderate right-to-left shunting    Holter Monitor - 72 Hour Up To 15 Days (09/06/2024 10:52)    No clinically significant abnormality    CT Angiogram Head w AI Analysis of LVO (09/04/2024 23:55)    Normal, no revealed significant intracranial stenosis, aneurysm, AVM, LVO    CT Angiogram Neck (09/04/2024 23:55)    No significant stenosis    CT CEREBRAL PERFUSION WITH & WITHOUT CONTRAST (09/05/2024 00:09)    Normal findings    MRI Brain With & Without Contrast (09/05/2024 06:59)    Acute left thalamic CVA without hemorrhage or complicating features.  She does not have a significant burden of small vessel disease nor advanced atrophy.          Assessment & Plan   Diagnoses and all orders for this visit:    1. Left thalamic infarction (Primary)    2. Right sided weakness    3. PFO (patent foramen ovale)      Reviewed all hospital studies and present findings with the patient in detail.  For secondary prevention of stroke would certainly recommend continuation of 81 mg aspirin, with PFO and no other findings continuation of aspirin 81 mg a day is recommended unless there is  contraindication for aspirin or antiplatelet therapy.  Would also recommend that her LDL be addressed to obtain an LDL less than 70.  The patient's opinion is to avoid long-term aspirin and statin use.  I have urged her to discuss this with her primary care provider and indicated that this is our recommendation at the present time.    Post stroke symptoms of sensory change and right-sided weakness have largely resolved and the patient is resuming normal activity.  I have discussed anticipated recovery from stroke including possible mild resurgence of symptoms under stress or illness, fatigue and incomplete resolution of symptoms.  I reminded the patient of acute stroke signs and symptoms and advised her to present to emergency department/stroke center if these recur.    I discussed our inclination against PFO closure at the present time.  She has discussed this with cardiology as well (Dr. Radha MD) and is comfortable with this decision.    We will plan to see this patient on an as-needed basis.               Dictated utilizing Dragon dictation.

## 2025-02-24 RX ORDER — HYOSCYAMINE SULFATE 0.12 MG/1
0.12 TABLET SUBLINGUAL EVERY 12 HOURS PRN
Qty: 180 TABLET | Refills: 0 | Status: SHIPPED | OUTPATIENT
Start: 2025-02-24

## 2025-04-09 ENCOUNTER — OFFICE VISIT (OUTPATIENT)
Dept: INTERNAL MEDICINE | Facility: CLINIC | Age: 75
End: 2025-04-09
Payer: MEDICARE

## 2025-04-09 VITALS
SYSTOLIC BLOOD PRESSURE: 132 MMHG | DIASTOLIC BLOOD PRESSURE: 78 MMHG | OXYGEN SATURATION: 97 % | HEIGHT: 64 IN | TEMPERATURE: 97.8 F | HEART RATE: 69 BPM | BODY MASS INDEX: 29.88 KG/M2 | WEIGHT: 175 LBS

## 2025-04-09 DIAGNOSIS — R73.01 IMPAIRED FASTING GLUCOSE: ICD-10-CM

## 2025-04-09 DIAGNOSIS — Z79.899 ENCOUNTER FOR LONG-TERM CURRENT USE OF MEDICATION: ICD-10-CM

## 2025-04-09 DIAGNOSIS — E78.2 MIXED HYPERLIPIDEMIA: ICD-10-CM

## 2025-04-09 DIAGNOSIS — Z00.00 MEDICARE ANNUAL WELLNESS VISIT, SUBSEQUENT: Primary | ICD-10-CM

## 2025-04-09 DIAGNOSIS — E03.9 ACQUIRED HYPOTHYROIDISM: ICD-10-CM

## 2025-04-09 PROCEDURE — 1159F MED LIST DOCD IN RCRD: CPT

## 2025-04-09 PROCEDURE — 1170F FXNL STATUS ASSESSED: CPT

## 2025-04-09 PROCEDURE — 1126F AMNT PAIN NOTED NONE PRSNT: CPT

## 2025-04-09 PROCEDURE — G0439 PPPS, SUBSEQ VISIT: HCPCS

## 2025-04-09 PROCEDURE — 1160F RVW MEDS BY RX/DR IN RCRD: CPT

## 2025-04-09 PROCEDURE — 96160 PT-FOCUSED HLTH RISK ASSMT: CPT

## 2025-04-09 NOTE — PROGRESS NOTES
Subjective   The ABCs of the Annual Wellness Visit  Medicare Wellness Visit      Zabrina Contreras is a 74 y.o. patient who presents for a Medicare Wellness Visit.    The following portions of the patient's history were reviewed and   updated as appropriate: allergies, current medications, past family history, past medical history, past social history, past surgical history, and problem list.    Compared to one year ago, the patient's physical   health is the same.  Compared to one year ago, the patient's mental   health is the same.    Recent Hospitalizations:  This patient has had a [facility] admission record on file within the last 365 days.  Current Medical Providers:  Patient Care Team:  Jenny Wu APRN as PCP - General (Internal Medicine)  Jaime Garcia MD as Cardiologist (Cardiology)    Outpatient Medications Prior to Visit   Medication Sig Dispense Refill    aspirin 81 MG EC tablet Take 1 tablet by mouth Daily. 30 tablet 3    Digestive Enzymes (DIGESTIVE ENZYME PO) Take 1 tablet by mouth As Needed.      hyoscyamine (Levsin/SL) 0.125 MG SL tablet Place 1 tablet under the tongue Every 12 (Twelve) Hours As Needed for Cramping. 180 tablet 0    levothyroxine (SYNTHROID, LEVOTHROID) 100 MCG tablet TAKE 1 TABLET EVERY DAY 90 tablet 3    Red Yeast Rice 600 MG capsule Take 2 capsules by mouth Daily.       No facility-administered medications prior to visit.     No opioid medication identified on active medication list. I have reviewed chart for other potential  high risk medication/s and harmful drug interactions in the elderly.      Aspirin is on active medication list. Aspirin use is indicated based on review of current medical condition/s. Pros and cons of this therapy have been discussed today. Benefits of this medication outweigh potential harm.  Patient has been encouraged to continue taking this medication.  .      Patient Active Problem List   Diagnosis    Irritable bowel syndrome without  "diarrhea    History of colonic polyps    Hypothyroid    Hyperlipidemia    Microcytic anemia    Otalgia    Palpitations    Atypical chest pain    Post-menopause    BRBPR (bright red blood per rectum)    Bloating    Diarrhea    Early satiety    S/P cholecystectomy    Bilateral lower abdominal cramping    Upper abdominal pain    Exposure to second hand tobacco smoke    Chronic interstitial lung disease    Right sided weakness    Acute ischemic stroke    Palpitations    PFO (patent foramen ovale)    Left thalamic infarction     Advance Care Planning Advance Directive is not on file.  ACP discussion was held with the patient during this visit. Patient does not have an advance directive, information provided.          Objective   Vitals:    25 1002   BP: 132/78   BP Location: Left arm   Patient Position: Sitting   Cuff Size: Adult   Pulse: 69   Temp: 97.8 °F (36.6 °C)   TempSrc: Temporal   SpO2: 97%   Weight: 79.4 kg (175 lb)   Height: 162.6 cm (64.02\")   PainSc: 0-No pain       Estimated body mass index is 30.02 kg/m² as calculated from the following:    Height as of this encounter: 162.6 cm (64.02\").    Weight as of this encounter: 79.4 kg (175 lb).                Does the patient have evidence of cognitive impairment? No                                                                                                Health  Risk Assessment    Smoking Status:  Social History     Tobacco Use   Smoking Status Former    Current packs/day: 0.00    Average packs/day: 1 pack/day for 19.0 years (19.0 ttl pk-yrs)    Types: Cigarettes    Start date:     Quit date:     Years since quittin.2   Smokeless Tobacco Never   Tobacco Comments    Quit 25 years ago     Alcohol Consumption:  Social History     Substance and Sexual Activity   Alcohol Use Yes    Comment: occasional       Fall Risk Screen  STEADI Fall Risk Assessment was completed, and patient is at LOW risk for falls.Assessment completed " on:2025    Depression Screening   Little interest or pleasure in doing things? Not at all   Feeling down, depressed, or hopeless? Not at all   PHQ-2 Total Score 0      Health Habits and Functional and Cognitive Screenin/9/2025    10:06 AM   Functional & Cognitive Status   Do you have difficulty preparing food and eating? No   Do you have difficulty bathing yourself, getting dressed or grooming yourself? No   Do you have difficulty using the toilet? No   Do you have difficulty moving around from place to place? No   Do you have trouble with steps or getting out of a bed or a chair? No   Current Diet Well Balanced Diet   Dental Exam Up to date   Eye Exam Up to date   Exercise (times per week) 0 times per week   Current Exercises Include Yard Work;House Cleaning   Do you need help using the phone?  No   Are you deaf or do you have serious difficulty hearing?  No   Do you need help to go to places out of walking distance? No   Do you need help shopping? No   Do you need help preparing meals?  No   Do you need help with housework?  No   Do you need help with laundry? No   Do you need help taking your medications? No   Do you need help managing money? No   Do you ever drive or ride in a car without wearing a seat belt? No   Have you felt unusual stress, anger or loneliness in the last month? Yes   Who do you live with? Alone   If you need help, do you have trouble finding someone available to you? No   Have you been bothered in the last four weeks by sexual problems? No   Do you have difficulty concentrating, remembering or making decisions? No           Age-appropriate Screening Schedule:  Refer to the list below for future screening recommendations based on patient's age, sex and/or medical conditions. Orders for these recommended tests are listed in the plan section. The patient has been provided with a written plan.    Health Maintenance List  Health Maintenance   Topic Date Due    TDAP/TD VACCINES (1   Tdap) Never done    ZOSTER VACCINE (1 of 2) Never done    Pneumococcal Vaccine 50+ (2 of 2 - PPSV23) 04/19/2021    RSV Vaccine - Adults (1 - 1-dose 75+ series) Never done    COVID-19 Vaccine (4 - 2024-25 season) 09/01/2024    INFLUENZA VACCINE  07/01/2025    LIPID PANEL  09/05/2025    ANNUAL WELLNESS VISIT  04/09/2026    DXA SCAN  06/11/2026    COLORECTAL CANCER SCREENING  06/13/2026    HEPATITIS C SCREENING  Completed    MAMMOGRAM  Discontinued                                                                                                                                                CMS Preventative Services Quick Reference  Risk Factors Identified During Encounter  Immunizations Discussed/Encouraged: Tdap, Prevnar 20 (Pneumococcal 20-valent conjugate), Shingrix, and RSV (Respiratory Syncytial Virus)  Dental Screening Recommended  Vision Screening Recommended    History of Present Illness  The patient is a 74-year-old female who presents today for a Medicare wellness exam.    She inquires about potential bladder prolapse, which occasionally causes discomfort when sitting. She reports occasional incontinence if she delays urination.     Assessment & Plan  1. Medicare wellness examination.  Preventative counseling provided. Body mass index is 30.02 kg/m².  Recommend maintaining healthy diet and being physically active.  Last mammogram was completed on 6/11/2024.  Plans to repeat every 2 years per patient request.  Last colonoscopy was completed on 6/13/2023 with 3 year recall.  DEXA scan completed on 6/11/2024. Pap smear no longer indicated.  Immunizations reviewed.  Recommend annual dental and vision screening.    2. Suspected cystocele.  She has been advised to engage in pelvic floor therapy exercises consistently and monitor for any changes. If symptoms persist, a referral to a gynecologist will be considered.    The above risks/problems have been discussed with the patient.  Pertinent information has been  shared with the patient in the After Visit Summary.  An After Visit Summary and PPPS were made available to the patient.    Follow Up:   Next Medicare Wellness visit to be scheduled in 1 year.

## 2025-04-10 LAB
ALBUMIN SERPL-MCNC: 4.2 G/DL (ref 3.5–5.2)
ALBUMIN/GLOB SERPL: 1.4 G/DL
ALP SERPL-CCNC: 90 U/L (ref 39–117)
ALT SERPL-CCNC: 23 U/L (ref 1–33)
AST SERPL-CCNC: 22 U/L (ref 1–32)
BASOPHILS # BLD AUTO: 0.12 10*3/MM3 (ref 0–0.2)
BASOPHILS NFR BLD AUTO: 1.5 % (ref 0–1.5)
BILIRUB SERPL-MCNC: 0.5 MG/DL (ref 0–1.2)
BUN SERPL-MCNC: 13 MG/DL (ref 8–23)
BUN/CREAT SERPL: 18.8 (ref 7–25)
CALCIUM SERPL-MCNC: 10 MG/DL (ref 8.6–10.5)
CHLORIDE SERPL-SCNC: 105 MMOL/L (ref 98–107)
CHOLEST SERPL-MCNC: 225 MG/DL (ref 0–200)
CO2 SERPL-SCNC: 24.3 MMOL/L (ref 22–29)
CREAT SERPL-MCNC: 0.69 MG/DL (ref 0.57–1)
EGFRCR SERPLBLD CKD-EPI 2021: 90.1 ML/MIN/1.73
EOSINOPHIL # BLD AUTO: 0.32 10*3/MM3 (ref 0–0.4)
EOSINOPHIL NFR BLD AUTO: 4.1 % (ref 0.3–6.2)
ERYTHROCYTE [DISTWIDTH] IN BLOOD BY AUTOMATED COUNT: 13.8 % (ref 12.3–15.4)
GLOBULIN SER CALC-MCNC: 2.9 GM/DL
GLUCOSE SERPL-MCNC: 101 MG/DL (ref 65–99)
HBA1C MFR BLD: 5.7 % (ref 4.8–5.6)
HCT VFR BLD AUTO: 45.6 % (ref 34–46.6)
HDLC SERPL-MCNC: 46 MG/DL (ref 40–60)
HGB BLD-MCNC: 14.3 G/DL (ref 12–15.9)
IMM GRANULOCYTES # BLD AUTO: 0.02 10*3/MM3 (ref 0–0.05)
IMM GRANULOCYTES NFR BLD AUTO: 0.3 % (ref 0–0.5)
LDLC SERPL CALC-MCNC: 161 MG/DL (ref 0–100)
LYMPHOCYTES # BLD AUTO: 2.93 10*3/MM3 (ref 0.7–3.1)
LYMPHOCYTES NFR BLD AUTO: 37.6 % (ref 19.6–45.3)
MCH RBC QN AUTO: 25.3 PG (ref 26.6–33)
MCHC RBC AUTO-ENTMCNC: 31.4 G/DL (ref 31.5–35.7)
MCV RBC AUTO: 80.7 FL (ref 79–97)
MONOCYTES # BLD AUTO: 0.82 10*3/MM3 (ref 0.1–0.9)
MONOCYTES NFR BLD AUTO: 10.5 % (ref 5–12)
NEUTROPHILS # BLD AUTO: 3.58 10*3/MM3 (ref 1.7–7)
NEUTROPHILS NFR BLD AUTO: 46 % (ref 42.7–76)
NRBC BLD AUTO-RTO: 0 /100 WBC (ref 0–0.2)
PLATELET # BLD AUTO: 448 10*3/MM3 (ref 140–450)
POTASSIUM SERPL-SCNC: 4.8 MMOL/L (ref 3.5–5.2)
PROT SERPL-MCNC: 7.1 G/DL (ref 6–8.5)
RBC # BLD AUTO: 5.65 10*6/MM3 (ref 3.77–5.28)
SODIUM SERPL-SCNC: 141 MMOL/L (ref 136–145)
TRIGL SERPL-MCNC: 98 MG/DL (ref 0–150)
TSH SERPL DL<=0.005 MIU/L-ACNC: 0.81 UIU/ML (ref 0.27–4.2)
VLDLC SERPL CALC-MCNC: 18 MG/DL (ref 5–40)
WBC # BLD AUTO: 7.79 10*3/MM3 (ref 3.4–10.8)

## 2025-05-13 RX ORDER — HYOSCYAMINE SULFATE 0.12 MG/1
TABLET SUBLINGUAL
Qty: 180 TABLET | Refills: 3 | Status: SHIPPED | OUTPATIENT
Start: 2025-05-13

## (undated) DEVICE — ENDO KIT,LOURDES HOSPITAL: Brand: MEDLINE INDUSTRIES, INC.

## (undated) DEVICE — FORCEPS BX L L240CM DIA2.4MM RAD JAW 4 HOT FOR POLYP DISP

## (undated) DEVICE — SINGLE PORT MANIFOLD: Brand: NEPTUNE 2

## (undated) DEVICE — FORCEPS BX 240CM 2.4MM L NDL RAD JAW 4 M00513334

## (undated) DEVICE — CANNULA NSL AD L7FT DIV O2 CO2 W/ M LUERLOCK TRMPT CONN